# Patient Record
Sex: FEMALE | Race: WHITE | Employment: OTHER | URBAN - METROPOLITAN AREA
[De-identification: names, ages, dates, MRNs, and addresses within clinical notes are randomized per-mention and may not be internally consistent; named-entity substitution may affect disease eponyms.]

---

## 2017-08-30 ENCOUNTER — HOSPITAL ENCOUNTER (OUTPATIENT)
Dept: LAB | Age: 62
Discharge: HOME | End: 2017-08-30
Payer: SUBSIDIZED

## 2017-08-30 DIAGNOSIS — C50.911: ICD-10-CM

## 2017-08-30 LAB
ALBUMIN SERPL-MCNC: 3.9 G/DL (ref 3.2–4.6)
ALBUMIN/GLOB SERPL: 1.1 {RATIO} (ref 1.2–3.5)
ALP SERPL-CCNC: 92 U/L (ref 50–136)
ALT SERPL-CCNC: 34 U/L (ref 12–65)
ANION GAP SERPL CALC-SCNC: 6 MMOL/L (ref 7–16)
AST SERPL-CCNC: 27 U/L (ref 15–37)
BASOPHILS # BLD: 0 K/UL (ref 0–0.2)
BASOPHILS NFR BLD: 1 % (ref 0–2)
BILIRUB SERPL-MCNC: 0.3 MG/DL (ref 0.2–1.1)
BUN SERPL-MCNC: 13 MG/DL (ref 8–23)
CALCIUM SERPL-MCNC: 9 MG/DL (ref 8.3–10.4)
CHLORIDE SERPL-SCNC: 109 MMOL/L (ref 98–107)
CO2 SERPL-SCNC: 27 MMOL/L (ref 21–32)
CREAT SERPL-MCNC: 0.92 MG/DL (ref 0.6–1)
DIFFERENTIAL METHOD BLD: ABNORMAL
EOSINOPHIL # BLD: 0 K/UL (ref 0–0.8)
EOSINOPHIL NFR BLD: 0 % (ref 0.5–7.8)
ERYTHROCYTE [DISTWIDTH] IN BLOOD BY AUTOMATED COUNT: 12.8 % (ref 11.9–14.6)
GLOBULIN SER CALC-MCNC: 3.4 G/DL (ref 2.3–3.5)
GLUCOSE SERPL-MCNC: 67 MG/DL (ref 65–100)
HCT VFR BLD AUTO: 40.9 % (ref 35.8–46.3)
HGB BLD-MCNC: 13.5 G/DL (ref 11.7–15.4)
LYMPHOCYTES # BLD: 1.3 K/UL (ref 0.5–4.6)
LYMPHOCYTES NFR BLD: 35 % (ref 13–44)
MCH RBC QN AUTO: 29.9 PG (ref 26.1–32.9)
MCHC RBC AUTO-ENTMCNC: 33 G/DL (ref 31.4–35)
MCV RBC AUTO: 90.7 FL (ref 79.6–97.8)
MONOCYTES # BLD: 0.2 K/UL (ref 0.1–1.3)
MONOCYTES NFR BLD: 6 % (ref 4–12)
NEUTS SEG # BLD: 2.1 K/UL (ref 1.7–8.2)
NEUTS SEG NFR BLD: 58 % (ref 43–78)
NRBC # BLD: 0 K/UL (ref 0–0.2)
PLATELET # BLD AUTO: 196 K/UL (ref 150–450)
PMV BLD AUTO: 9.8 FL (ref 10.8–14.1)
POTASSIUM SERPL-SCNC: 3.9 MMOL/L (ref 3.5–5.1)
PROT SERPL-MCNC: 7.3 G/DL (ref 6.3–8.2)
RBC # BLD AUTO: 4.51 M/UL (ref 4.05–5.25)
SODIUM SERPL-SCNC: 142 MMOL/L (ref 136–145)
WBC # BLD AUTO: 3.6 K/UL (ref 4.3–11.1)

## 2017-08-30 PROCEDURE — 36415 COLL VENOUS BLD VENIPUNCTURE: CPT | Performed by: INTERNAL MEDICINE

## 2017-08-30 PROCEDURE — 80053 COMPREHEN METABOLIC PANEL: CPT | Performed by: INTERNAL MEDICINE

## 2017-08-30 PROCEDURE — 85025 COMPLETE CBC W/AUTO DIFF WBC: CPT | Performed by: INTERNAL MEDICINE

## 2017-11-10 ENCOUNTER — HOSPITAL ENCOUNTER (OUTPATIENT)
Dept: LAB | Age: 62
Discharge: HOME | End: 2017-11-10
Payer: SUBSIDIZED

## 2017-11-10 DIAGNOSIS — C50.919 INFILTRATING DUCT CARCINOMA (HCC): ICD-10-CM

## 2017-11-10 LAB
ALBUMIN SERPL-MCNC: 4 G/DL (ref 3.2–4.6)
ALBUMIN/GLOB SERPL: 1.1 {RATIO} (ref 1.2–3.5)
ALP SERPL-CCNC: 89 U/L (ref 50–136)
ALT SERPL-CCNC: 37 U/L (ref 12–65)
ANION GAP SERPL CALC-SCNC: 9 MMOL/L (ref 7–16)
AST SERPL-CCNC: 33 U/L (ref 15–37)
BASOPHILS # BLD: 0 K/UL (ref 0–0.2)
BASOPHILS NFR BLD: 1 % (ref 0–2)
BILIRUB SERPL-MCNC: 0.5 MG/DL (ref 0.2–1.1)
BUN SERPL-MCNC: 18 MG/DL (ref 8–23)
CALCIUM SERPL-MCNC: 9.2 MG/DL (ref 8.3–10.4)
CHLORIDE SERPL-SCNC: 104 MMOL/L (ref 98–107)
CO2 SERPL-SCNC: 28 MMOL/L (ref 21–32)
CREAT SERPL-MCNC: 0.89 MG/DL (ref 0.6–1)
DIFFERENTIAL METHOD BLD: ABNORMAL
EOSINOPHIL # BLD: 0 K/UL (ref 0–0.8)
EOSINOPHIL NFR BLD: 0 % (ref 0.5–7.8)
ERYTHROCYTE [DISTWIDTH] IN BLOOD BY AUTOMATED COUNT: 12.9 % (ref 11.9–14.6)
GLOBULIN SER CALC-MCNC: 3.5 G/DL (ref 2.3–3.5)
GLUCOSE SERPL-MCNC: 94 MG/DL (ref 65–100)
HCT VFR BLD AUTO: 40.7 % (ref 35.8–46.3)
HGB BLD-MCNC: 13.8 G/DL (ref 11.7–15.4)
LYMPHOCYTES # BLD: 1.3 K/UL (ref 0.5–4.6)
LYMPHOCYTES NFR BLD: 32 % (ref 13–44)
MCH RBC QN AUTO: 30 PG (ref 26.1–32.9)
MCHC RBC AUTO-ENTMCNC: 33.9 G/DL (ref 31.4–35)
MCV RBC AUTO: 88.5 FL (ref 79.6–97.8)
MONOCYTES # BLD: 0.2 K/UL (ref 0.1–1.3)
MONOCYTES NFR BLD: 6 % (ref 4–12)
NEUTS SEG # BLD: 2.5 K/UL (ref 1.7–8.2)
NEUTS SEG NFR BLD: 61 % (ref 43–78)
NRBC # BLD: 0 K/UL (ref 0–0.2)
PLATELET # BLD AUTO: 171 K/UL (ref 150–450)
PMV BLD AUTO: 9.3 FL (ref 10.8–14.1)
POTASSIUM SERPL-SCNC: 3.8 MMOL/L (ref 3.5–5.1)
PROT SERPL-MCNC: 7.5 G/DL (ref 6.3–8.2)
RBC # BLD AUTO: 4.6 M/UL (ref 4.05–5.25)
SODIUM SERPL-SCNC: 141 MMOL/L (ref 136–145)
WBC # BLD AUTO: 4.1 K/UL (ref 4.3–11.1)

## 2017-11-10 PROCEDURE — 85025 COMPLETE CBC W/AUTO DIFF WBC: CPT | Performed by: NURSE PRACTITIONER

## 2017-11-10 PROCEDURE — 80053 COMPREHEN METABOLIC PANEL: CPT | Performed by: NURSE PRACTITIONER

## 2017-11-10 PROCEDURE — 36415 COLL VENOUS BLD VENIPUNCTURE: CPT | Performed by: NURSE PRACTITIONER

## 2017-11-15 ENCOUNTER — HOSPITAL ENCOUNTER (OUTPATIENT)
Dept: NUCLEAR MEDICINE | Age: 62
Discharge: HOME | End: 2017-11-15
Attending: NURSE PRACTITIONER
Payer: SUBSIDIZED

## 2017-11-15 DIAGNOSIS — C50.919 INFILTRATING DUCT CARCINOMA (HCC): ICD-10-CM

## 2017-11-15 DIAGNOSIS — M89.8X2 PAIN OF LEFT HUMERUS: ICD-10-CM

## 2017-11-15 PROCEDURE — 78306 BONE IMAGING WHOLE BODY: CPT

## 2018-02-01 PROBLEM — Z91.040 LATEX ALLERGY: Status: ACTIVE | Noted: 2018-02-01

## 2018-02-01 PROBLEM — Z85.3 HISTORY OF BREAST CANCER: Status: ACTIVE | Noted: 2018-02-01

## 2018-02-01 PROBLEM — K21.9 GASTROESOPHAGEAL REFLUX DISEASE: Status: ACTIVE | Noted: 2018-02-01

## 2018-02-01 PROBLEM — I10 ESSENTIAL HYPERTENSION: Status: ACTIVE | Noted: 2018-02-01

## 2018-02-07 ENCOUNTER — HOSPITAL ENCOUNTER (OUTPATIENT)
Dept: MAMMOGRAPHY | Age: 63
Discharge: HOME | End: 2018-02-07
Attending: INTERNAL MEDICINE
Payer: SUBSIDIZED

## 2018-02-07 DIAGNOSIS — M85.80 OSTEOPENIA, UNSPECIFIED LOCATION: ICD-10-CM

## 2018-02-07 PROCEDURE — 77080 DXA BONE DENSITY AXIAL: CPT

## 2018-02-08 NOTE — PROGRESS NOTES
Your bone density again indicates osteopenia and is slightly lower but similar to 2013 levels. I recommend you continue daily exercise, calcium/vitamin D supplements to protect your bones.

## 2018-02-19 ENCOUNTER — ANESTHESIA EVENT (OUTPATIENT)
Dept: SURGERY | Age: 63
End: 2018-02-19
Payer: SUBSIDIZED

## 2018-02-20 ENCOUNTER — ANESTHESIA (OUTPATIENT)
Dept: SURGERY | Age: 63
End: 2018-02-20
Payer: SUBSIDIZED

## 2018-02-20 ENCOUNTER — HOSPITAL ENCOUNTER (OUTPATIENT)
Age: 63
Setting detail: OUTPATIENT SURGERY
Discharge: HOME | End: 2018-02-20
Attending: ORTHOPAEDIC SURGERY | Admitting: ORTHOPAEDIC SURGERY
Payer: SUBSIDIZED

## 2018-02-20 VITALS
RESPIRATION RATE: 15 BRPM | DIASTOLIC BLOOD PRESSURE: 91 MMHG | OXYGEN SATURATION: 97 % | SYSTOLIC BLOOD PRESSURE: 155 MMHG | HEART RATE: 71 BPM | TEMPERATURE: 97.7 F | HEIGHT: 65 IN | BODY MASS INDEX: 26.49 KG/M2 | WEIGHT: 159 LBS

## 2018-02-20 DIAGNOSIS — L76.82 PAIN AT SURGICAL INCISION: Primary | ICD-10-CM

## 2018-02-20 PROCEDURE — 76210000021 HC REC RM PH II 0.5 TO 1 HR: Performed by: ORTHOPAEDIC SURGERY

## 2018-02-20 PROCEDURE — 88305 TISSUE EXAM BY PATHOLOGIST: CPT | Performed by: ORTHOPAEDIC SURGERY

## 2018-02-20 PROCEDURE — 74011250636 HC RX REV CODE- 250/636: Performed by: ORTHOPAEDIC SURGERY

## 2018-02-20 PROCEDURE — 74011000250 HC RX REV CODE- 250

## 2018-02-20 PROCEDURE — 77030000032 HC CUF TRNQT ZIMM -B: Performed by: ORTHOPAEDIC SURGERY

## 2018-02-20 PROCEDURE — 77030018836 HC SOL IRR NACL ICUM -A: Performed by: ORTHOPAEDIC SURGERY

## 2018-02-20 PROCEDURE — 74011000250 HC RX REV CODE- 250: Performed by: ORTHOPAEDIC SURGERY

## 2018-02-20 PROCEDURE — 76060000032 HC ANESTHESIA 0.5 TO 1 HR: Performed by: ORTHOPAEDIC SURGERY

## 2018-02-20 PROCEDURE — 76010000138 HC OR TIME 0.5 TO 1 HR: Performed by: ORTHOPAEDIC SURGERY

## 2018-02-20 PROCEDURE — 76210000063 HC OR PH I REC FIRST 0.5 HR: Performed by: ORTHOPAEDIC SURGERY

## 2018-02-20 PROCEDURE — 74011250636 HC RX REV CODE- 250/636: Performed by: ANESTHESIOLOGY

## 2018-02-20 PROCEDURE — 74011250637 HC RX REV CODE- 250/637: Performed by: ANESTHESIOLOGY

## 2018-02-20 PROCEDURE — 74011250636 HC RX REV CODE- 250/636

## 2018-02-20 RX ORDER — OXYCODONE HYDROCHLORIDE 5 MG/1
5 TABLET ORAL
Qty: 40 TAB | Refills: 0 | Status: SHIPPED | OUTPATIENT
Start: 2018-02-20 | End: 2018-04-19

## 2018-02-20 RX ORDER — FAMOTIDINE 20 MG/1
20 TABLET, FILM COATED ORAL ONCE
Status: COMPLETED | OUTPATIENT
Start: 2018-02-20 | End: 2018-02-20

## 2018-02-20 RX ORDER — LIDOCAINE HYDROCHLORIDE 5 MG/ML
INJECTION, SOLUTION INFILTRATION; INTRAVENOUS AS NEEDED
Status: DISCONTINUED | OUTPATIENT
Start: 2018-02-20 | End: 2018-02-20 | Stop reason: HOSPADM

## 2018-02-20 RX ORDER — CEFAZOLIN SODIUM/WATER 2 G/20 ML
2 SYRINGE (ML) INTRAVENOUS ONCE
Status: COMPLETED | OUTPATIENT
Start: 2018-02-20 | End: 2018-02-20

## 2018-02-20 RX ORDER — SODIUM CHLORIDE, SODIUM LACTATE, POTASSIUM CHLORIDE, CALCIUM CHLORIDE 600; 310; 30; 20 MG/100ML; MG/100ML; MG/100ML; MG/100ML
75 INJECTION, SOLUTION INTRAVENOUS CONTINUOUS
Status: DISCONTINUED | OUTPATIENT
Start: 2018-02-20 | End: 2018-02-20 | Stop reason: HOSPADM

## 2018-02-20 RX ORDER — OXYCODONE HYDROCHLORIDE 5 MG/1
5 TABLET ORAL
Status: DISCONTINUED | OUTPATIENT
Start: 2018-02-20 | End: 2018-02-20 | Stop reason: HOSPADM

## 2018-02-20 RX ORDER — HYDROMORPHONE HYDROCHLORIDE 2 MG/ML
0.5 INJECTION, SOLUTION INTRAMUSCULAR; INTRAVENOUS; SUBCUTANEOUS
Status: DISCONTINUED | OUTPATIENT
Start: 2018-02-20 | End: 2018-02-20 | Stop reason: HOSPADM

## 2018-02-20 RX ORDER — FENTANYL CITRATE 50 UG/ML
100 INJECTION, SOLUTION INTRAMUSCULAR; INTRAVENOUS ONCE
Status: DISCONTINUED | OUTPATIENT
Start: 2018-02-20 | End: 2018-02-20 | Stop reason: HOSPADM

## 2018-02-20 RX ORDER — PROPOFOL 10 MG/ML
INJECTION, EMULSION INTRAVENOUS
Status: DISCONTINUED | OUTPATIENT
Start: 2018-02-20 | End: 2018-02-20 | Stop reason: HOSPADM

## 2018-02-20 RX ORDER — BUPIVACAINE HYDROCHLORIDE 2.5 MG/ML
INJECTION, SOLUTION EPIDURAL; INFILTRATION; INTRACAUDAL AS NEEDED
Status: DISCONTINUED | OUTPATIENT
Start: 2018-02-20 | End: 2018-02-20 | Stop reason: HOSPADM

## 2018-02-20 RX ORDER — MIDAZOLAM HYDROCHLORIDE 1 MG/ML
2 INJECTION, SOLUTION INTRAMUSCULAR; INTRAVENOUS ONCE
Status: DISCONTINUED | OUTPATIENT
Start: 2018-02-20 | End: 2018-02-20 | Stop reason: HOSPADM

## 2018-02-20 RX ORDER — LIDOCAINE HYDROCHLORIDE 10 MG/ML
0.1 INJECTION INFILTRATION; PERINEURAL AS NEEDED
Status: DISCONTINUED | OUTPATIENT
Start: 2018-02-20 | End: 2018-02-20 | Stop reason: HOSPADM

## 2018-02-20 RX ORDER — PROPOFOL 10 MG/ML
INJECTION, EMULSION INTRAVENOUS AS NEEDED
Status: DISCONTINUED | OUTPATIENT
Start: 2018-02-20 | End: 2018-02-20 | Stop reason: HOSPADM

## 2018-02-20 RX ORDER — OXYCODONE HYDROCHLORIDE 5 MG/1
10 TABLET ORAL
Status: DISCONTINUED | OUTPATIENT
Start: 2018-02-20 | End: 2018-02-20 | Stop reason: HOSPADM

## 2018-02-20 RX ADMIN — PROPOFOL 30 MG: 10 INJECTION, EMULSION INTRAVENOUS at 14:11

## 2018-02-20 RX ADMIN — Medication 2 G: at 14:07

## 2018-02-20 RX ADMIN — PROPOFOL 100 MCG/KG/MIN: 10 INJECTION, EMULSION INTRAVENOUS at 14:10

## 2018-02-20 RX ADMIN — FAMOTIDINE 20 MG: 20 TABLET, FILM COATED ORAL at 14:00

## 2018-02-20 RX ADMIN — LIDOCAINE HYDROCHLORIDE 25 ML: 5 INJECTION, SOLUTION INFILTRATION; INTRAVENOUS at 14:13

## 2018-02-20 RX ADMIN — SODIUM CHLORIDE, SODIUM LACTATE, POTASSIUM CHLORIDE, AND CALCIUM CHLORIDE 75 ML/HR: 600; 310; 30; 20 INJECTION, SOLUTION INTRAVENOUS at 13:40

## 2018-02-20 NOTE — DISCHARGE INSTRUCTIONS
Elevate operative hand  Move fingers often  Expect finger swelling  Call office for questions or problems  Wound care as discussed. Remove dressing in 2-3 days and cover as directed. TYPICAL SIDE EFFECTS OF PAIN MEDICATION:  *    Constipation: Drink lots of fluids, try prune juice. OTC stool softener if needed. *    Nausea: Take pain medication with food. ACTIVITY  · As tolerated and as directed by your doctor. · Bathe or shower as directed by your doctor. DIET  · Day of surgery: Clear liquids until no nausea or vomiting; small portion, light diet [place] foods (ex: baked chicken, plain rice, grits, scrambled eggs, toast). Nothing greasy, fried or spicy today. · Advance to regular diet on second day, unless your doctor orders otherwise. · If nausea and vomiting continues, call your doctor. PAIN  · Take pain medication as directed by your doctor. · DO NOT take aspirin or blood thinners unless directed by your doctor. CALL YOUR DOCTOR IF    s Call your doctor if pain is NOT relieved by medication.   s Excessive bleeding that does not stop after holding pressure over the area  · Temperature of 101 degrees F or above  · Excessive redness, swelling or bruising, and/ or green or yellow, smelly discharge from incision    AFTER ANESTHESIA   · For the first 24 hours: DO NOT Drive, Drink alcoholic beverages, or Make important decisions. · Be aware of dizziness following anesthesia and while taking pain medication. DISCHARGE SUMMARY from Nurse    PATIENT INSTRUCTIONS:    After general anesthesia or intravenous sedation, for 24 hours or while taking prescription Narcotics:  · Limit your activities  · Do not drive and operate hazardous machinery  · Do not make important personal or business decisions  · Do  not drink alcoholic beverages  · If you have not urinated within 8 hours after discharge, please contact your surgeon on call.     *  Please give a list of your current medications to your Primary Care Provider. *  Please update this list whenever your medications are discontinued, doses are      changed, or new medications (including over-the-counter products) are added. *  Please carry medication information at all times in case of emergency situations. Preventing Infection at Home  We care about preventing infection and avoiding the spread of germs - not only when you are in the hospital but also when you return home. When you return home from the hospital, its important to take the following steps to help prevent infection and avoid spreading germs that could infect you and others. Ask everyone in your home to follow these guidelines, too. Clean Your Hands  · Clean your hands whenever your hands are visibly dirty, before you eat, before or after touching your mouth, nose or eyes, and before preparing food. Clean them after contact with body fluids, using the restroom, touching animals or changing diapers. · When washing hands, wet them with warm water and work up a lather. Rub hands for at least 15 seconds, then rinse them and pat them dry with a clean towel or paper towel. · When using hand sanitizers, it should take about 15 seconds to rub your hands dry. If not, you probably didnt apply enough . Cover Your Sneeze or Cough  Germs are released into the air whenever you sneeze or cough. To prevent the spread of infection:  · Turn away from other people before coughing or sneezing. · Cover your mouth or nose with a tissue when you cough or sneeze. Put the tissue in the trash. · If you dont have a tissue, cough or sneeze into your upper sleeve, not your hands. · Always clean your hands after coughing or sneezing. Care for Wounds  Your skin is your bodys first line of defense against germs, but an open wound leaves an easy way for germs to enter your body.  To prevent infection:  · Clean your hands before and after changing wound dressings, and wear gloves to change dressings if recommended by your doctor. · Take special care with IV lines or other devices inserted into the body. If you must touch them, clean your hands first.  · Follow any specific instructions from your doctor to care for your wounds. Contact your doctor if you experience any signs of infection, such as fever or increased redness at the surgical or wound site. Keep a Clean Home  · Clean or wipe commonly touched hard surfaces like door handles, sinks, tabletops, phones and TV remotes. · Use products labeled disinfectant to kill harmful bacteria and viruses. · Use a clean cloth or paper towel to clean and dry surfaces. Wiping surfaces with a dirty dishcloth, sponge or towel will only spread germs. · Never share toothbrushes, farris, drinking glasses, utensils, razor blades, face cloths or bath towels to avoid spreading germs. · Be sure that the linens that you sleep on are clean. · Keep pets away from wounds and wash your hands after touching pets, their toys or bedding. We care about you and your health. Remember, preventing infections is a team effort between you, your family, friends and health care providers. These are general instructions for a healthy lifestyle:    No smoking/ No tobacco products/ Avoid exposure to second hand smoke    Surgeon General's Warning:  Quitting smoking now greatly reduces serious risk to your health. Obesity, smoking, and sedentary lifestyle greatly increases your risk for illness    A healthy diet, regular physical exercise & weight monitoring are important for maintaining a healthy lifestyle    You may be retaining fluid if you have a history of heart failure or if you experience any of the following symptoms:  Weight gain of 3 pounds or more overnight or 5 pounds in a week, increased swelling in our hands or feet or shortness of breath while lying flat in bed.   Please call your doctor as soon as you notice any of these symptoms; do not wait until your next office visit. Recognize signs and symptoms of STROKE:    F-face looks uneven    A-arms unable to move or move unevenly    S-speech slurred or non-existent    T-time-call 911 as soon as signs and symptoms begin-DO NOT go       Back to bed or wait to see if you get better-TIME IS BRAIN.

## 2018-02-20 NOTE — IP AVS SNAPSHOT
[address] [facility] 
[phone] Patient: Sandra Aguirre MRN: [id number]:[id number] About your hospitalization You were admitted on:  February 20, 2018 You last received care in the:  [facility] PACU You were discharged on:  February 20, 2018 Why you were hospitalized Your primary diagnosis was:  Not on File Follow-up Information Follow up With Details Comments Contact Info Roderick Mei MD   [address] [facility] 
[address] [place] [address] Raudel Bourgeois MD   [address] [facility] [address],[address] 
[phone] Discharge Orders None A check levi indicates which time of day the medication should be taken. My Medications START taking these medications Instructions Each Dose to Equal  
 Morning Noon Evening Bedtime  
 oxyCODONE IR 5 mg immediate release tablet Commonly known as:  Minerva Furdaniel Your last dose was: Your next dose is: Take 1 Tab by mouth every four (4) hours as needed. Max Daily Amount: 30 mg.  
 5 mg CONTINUE taking these medications Instructions Each Dose to Equal  
 Morning Noon Evening Bedtime CALCIUM CITRATE + D PO Your last dose was: Your next dose is: Take 2 capsules by mouth two (2) times a day. 630 mg and 500 iu 2 Cap CEROVITE ADVANCED FORMULA PO Your last dose was: Your next dose is: Take 1 Tab by mouth daily. 1 Tab  
    
   
   
   
  
 cloNIDine HCl 0.1 mg tablet Commonly known as:  CATAPRES Your last dose was: Your next dose is: Take 0.1 mg by mouth every evening. 0.1 mg  
    
   
   
   
  
 CRANBERRY CONCENTRATE Cap Generic drug:  vitamin c-vitamin e Your last dose was: Your next dose is: Take 168 mg by mouth nightly. 168 mg  
    
   
   
   
  
 ECHINACEA PO Your last dose was: Your next dose is: Take 750 mg by mouth nightly. 750 mg  
    
   
   
   
  
 EPIPEN 0.3 mg/0.3 mL injection Generic drug:  EPINEPHrine Your last dose was: Your next dose is: 0.3 mg by IntraMUSCular route once as needed. 0.3 mg  
    
   
   
   
  
 garlic 1 mg Cap Your last dose was: Your next dose is: Take  by mouth nightly. GINKOBA PO Your last dose was: Your next dose is: Take 120 mg by mouth two (2) times a day. 120 mg  
    
   
   
   
  
 magnesium 250 mg Tab Your last dose was: Your next dose is: Take  by mouth nightly. metoprolol tartrate 50 mg tablet Commonly known as:  LOPRESSOR Your last dose was: Your next dose is: Take 50 mg by mouth daily. 50 mg  
    
   
   
   
  
 OLIVE LEAF EXTRACT PO Your last dose was: Your next dose is: Take 150 mg by mouth daily. 150 mg  
    
   
   
   
  
 omeprazole 40 mg capsule Commonly known as:  PRILOSEC Your last dose was: Your next dose is: Take 40 mg by mouth daily. 40 mg  
    
   
   
   
  
 OTHER(NON-FORMULARY) Your last dose was: Your next dose is: Take  by mouth nightly. Alpha lipoic acid 200mg daily VITAMIN B-12 PO Your last dose was: Your next dose is: Take 2,500 mcg by mouth daily. 2500 mcg VITAMIN C 500 mg tablet Generic drug:  ascorbic acid (vitamin C) Your last dose was: Your next dose is: Take  by mouth.  
     
   
   
   
  
 vitamin E 400 unit capsule Commonly known as:  [address] Your last dose was: Your next dose is: Take  by mouth daily. Where to Get Your Medications Information on where to get these meds will be given to you by the nurse or doctor. ! Ask your nurse or doctor about these medications  
  oxyCODONE IR 5 mg immediate release tablet Discharge Instructions Elevate operative hand Move fingers often Expect finger swelling Call office for questions or problems Wound care as discussed. Remove dressing in 2-3 days and cover as directed. TYPICAL SIDE EFFECTS OF PAIN MEDICATION: 
*    Constipation: Drink lots of fluids, try prune juice. OTC stool softener if needed. *    Nausea: Take pain medication with food. ACTIVITY · As tolerated and as directed by your doctor. · Bathe or shower as directed by your doctor. DIET 
· Day of surgery: Clear liquids until no nausea or vomiting; small portion, light diet [place] foods (ex: baked chicken, plain rice, grits, scrambled eggs, toast). Nothing greasy, fried or spicy today. · Advance to regular diet on second day, unless your doctor orders otherwise. · If nausea and vomiting continues, call your doctor. PAIN 
· Take pain medication as directed by your doctor. · DO NOT take aspirin or blood thinners unless directed by your doctor. CALL YOUR DOCTOR IF   
s Call your doctor if pain is NOT relieved by medication.  
s Excessive bleeding that does not stop after holding pressure over the area · Temperature of 101 degrees F or above · Excessive redness, swelling or bruising, and/ or green or yellow, smelly discharge from incision AFTER ANESTHESIA · For the first 24 hours: DO NOT Drive, Drink alcoholic beverages, or Make important decisions. · Be aware of dizziness following anesthesia and while taking pain medication. DISCHARGE SUMMARY from Nurse PATIENT INSTRUCTIONS: 
 
After general anesthesia or intravenous sedation, for 24 hours or while taking prescription Narcotics: · Limit your activities · Do not drive and operate hazardous machinery · Do not make important personal or business decisions · Do  not drink alcoholic beverages · If you have not urinated within 8 hours after discharge, please contact your surgeon on call. *  Please give a list of your current medications to your Primary Care Provider. *  Please update this list whenever your medications are discontinued, doses are 
    changed, or new medications (including over-the-counter products) are added. *  Please carry medication information at all times in case of emergency situations. Preventing Infection at Home We care about preventing infection and avoiding the spread of germs  not only when you are in the hospital but also when you return home. When you return home from the hospital, its important to take the following steps to help prevent infection and avoid spreading germs that could infect you and others. Ask everyone in your home to follow these guidelines, too. Clean Your Hands · Clean your hands whenever your hands are visibly dirty, before you eat, before or after touching your mouth, nose or eyes, and before preparing food. Clean them after contact with body fluids, using the restroom, touching animals or changing diapers. · When washing hands, wet them with warm water and work up a lather. Rub hands for at least 15 seconds, then rinse them and pat them dry with a clean towel or paper towel. · When using hand sanitizers, it should take about 15 seconds to rub your hands dry. If not, you probably didnt apply enough . Cover Your Sneeze or Cough Germs are released into the air whenever you sneeze or cough. To prevent the spread of infection: · Turn away from other people before coughing or sneezing. · Cover your mouth or nose with a tissue when you cough or sneeze. Put the tissue in the trash.  
· If you dont have a tissue, cough or sneeze into your upper sleeve, not your hands. · Always clean your hands after coughing or sneezing. Care for Wounds Your skin is your bodys first line of defense against germs, but an open wound leaves an easy way for germs to enter your body. To prevent infection: · Clean your hands before and after changing wound dressings, and wear gloves to change dressings if recommended by your doctor. · Take special care with IV lines or other devices inserted into the body. If you must touch them, clean your hands first. 
· Follow any specific instructions from your doctor to care for your wounds. Contact your doctor if you experience any signs of infection, such as fever or increased redness at the surgical or wound site. Keep a [place] [address] · Clean or wipe commonly touched hard surfaces like door handles, sinks, tabletops, phones and TV remotes. · Use products labeled disinfectant to kill harmful bacteria and viruses. · Use a clean cloth or paper towel to clean and dry surfaces. Wiping surfaces with a dirty dishcloth, sponge or towel will only spread germs. · Never share toothbrushes, farris, drinking glasses, utensils, razor blades, face cloths or bath towels to avoid spreading germs. · Be sure that the linens that you sleep on are clean. · Keep pets away from wounds and wash your hands after touching pets, their toys or bedding. We care about you and your health. Remember, preventing infections is a team effort between you, your family, friends and health care providers. These are general instructions for a healthy lifestyle: No smoking/ No tobacco products/ Avoid exposure to second hand smoke Surgeon General's Warning:  Quitting smoking now greatly reduces serious risk to your health. Obesity, smoking, and sedentary lifestyle greatly increases your risk for illness A healthy diet, regular physical exercise & weight monitoring are important for maintaining a healthy lifestyle You may be retaining fluid if you have a history of heart failure or if you experience any of the following symptoms:  Weight gain of 3 pounds or more overnight or 5 pounds in a week, increased swelling in our hands or feet or shortness of breath while lying flat in bed. Please call your doctor as soon as you notice any of these symptoms; do not wait until your next office visit. Recognize signs and symptoms of STROKE: 
 
F-face looks uneven A-arms unable to move or move unevenly S-speech slurred or non-existent T-time-call 911 as soon as signs and symptoms begin-DO NOT go Back to bed or wait to see if you get better-TIME IS BRAIN. Introducing [facility]! Dear Cristopher Scott: Thank you for requesting a mSpot account. Our records indicate that you already have an active mSpot account. You can access your account anytime at [email or web address] Did you know that you can access your hospital and ER discharge instructions at any time in mSpot? You can also review all of your test results from your hospital stay or ER visit. Additional Information If you have questions, please visit the Frequently Asked Questions section of the mSpot website at [email or web address] Remember, mSpot is NOT to be used for urgent needs. For medical emergencies, dial 911. Now available from your iPhone and Android! Providers Seen During Your Hospitalization Provider Specialty Primary office phone Cam Michael MD Orthopedic Surgery [phone] Your Primary Care Physician (PCP) Primary Care Physician Office Phone Office Fax Pilar Costello [phone] You are allergic to the following Allergen Reactions Latex Anaphylaxis Rash The anaphylactic reaction was with latex paint Aleve (Naproxen Sodium) Swelling Facial/edema swelling Zestril (Lisinopril) Anaphylaxis Zofran (Ondansetron Hcl) Rash Intense vasodilation with redness, hypotension, responded to epinephrine Amlodipine Swelling  
 ankles swell Recent Documentation Height Weight BMI OB Status Smoking Status 1.638 m 72.1 kg 26.87 kg/m2 Hysterectomy Never Smoker Emergency Contacts Name Discharge Info Relation Home Work Mobile [address] CAREGIVER [3] Spouse [3] [phone] Patient Belongings The following personal items are in your possession at time of discharge: 
  Dental Appliances: None  Visual Aid: Glasses      Home Medications: None   Jewelry: None  Clothing: Socks, Shirt, Pants, Jacket/Coat, Footwear, Undergarments    Other Valuables: None Please provide this summary of care documentation to your next provider. Signatures-by signing, you are acknowledging that this After Visit Summary has been reviewed with you and you have received a copy. Patient Signature:  ____________________________________________________________ Date:  ____________________________________________________________  
  
Sherie Llanes Provider Signature:  ____________________________________________________________ Date:  ____________________________________________________________

## 2018-02-20 NOTE — H&P
Pre Operative History and Physical Exam    Patient ID:  Latonya Hamilton  [id number]  43 y.o.  1955    Today: February 20, 2018       Assessment:   1. Right hand mass        Plan:    1. Proceed with scheduled Procedure(s) (LRB):  SOFT TISSUE MASS EXCISION RIGHT HAND (Right)            CC: Right hand mass     HPI:   The patient has a right hand painful/ tender mass. The patient was evaluated and examined during a consultation prior to this office visit. There have been no changes to the patient's orthopedic condition since the initial consultation. The patient has failed previous conservative treatment for this condition. The patient will present the day of surgery for Procedure(s) (LRB):  SOFT TISSUE MASS EXCISION RIGHT HAND (Right)      Past Medical/Surgical History:  Past Medical History:   Diagnosis Date    Adverse effect of anesthesia     BP plummets intraop usually    Arthritis     DDD    Cancer ([address]     breast, left    Chronic pain     pt denies presently- lumbar sometimes    GERD (gastroesophageal reflux disease)     well controlled    HTN (hypertension)     120s/80s- controlled with med    Thromboembolus ([address]     jugular vein thrombus (port)     Past Surgical History:   Procedure Laterality Date    ABDOMEN SURGERY PROC UNLISTED      breast breast reconstruction with abdominal liposuction    HX BLADDER SUSPENSION      bladder tack    HX BREAST BIOPSY      x5    HX BREAST RECONSTRUCTION      x2    HX HEENT  02/2011    thyroglossal cyst removed    HX HYSTERECTOMY      HX MASTECTOMY Bilateral     x2    HX TONSILLECTOMY          Allergies:    Allergies   Allergen Reactions    Latex Anaphylaxis and Rash     The anaphylactic reaction was with latex paint    Aleve [Naproxen Sodium] Swelling     Facial/edema swelling    Zestril [Lisinopril] Anaphylaxis    Zofran [Ondansetron Hcl] Rash     Intense vasodilation with redness, hypotension, responded to epinephrine    Amlodipine Swelling     ankles baron        Physical Exam:   General: NAD, Alert, Oriented, Appears their stated age     [de-identified]: NC/AT, PERRL    Skin: No rashes, lesions or wounds seen      Psych: normal affect      Heart: Regular Rate, Rhythm     Lungs: unlabored respirations, normal breath sounds     Abdomen: Soft and non-distended     Ortho:  Soft tissue mass over radial side of right index finger    Neuro: no focal defects, sensation is equal bilaterally     Lymph: no lymphadenopathy     Meds:   Current Facility-Administered Medications   Medication Dose Route Frequency    ceFAZolin (ANCEF) 2 g/20 mL in sterile water IV syringe  2 g IntraVENous ONCE    lidocaine (XYLOCAINE) 10 mg/mL (1 %) injection 0.1 mL  0.1 mL SubCUTAneous PRN    lactated Ringers infusion  75 mL/hr IntraVENous CONTINUOUS    famotidine (PEPCID) tablet 20 mg  20 mg Oral ONCE    fentaNYL citrate (PF) injection 100 mcg  100 mcg IntraVENous ONCE    midazolam (VERSED) injection 2 mg  2 mg IntraVENous ONCE    midazolam (VERSED) injection 2 mg  2 mg IntraVENous ONCE         Labs:  Office Visit on 02/01/2018   Component Date Value Ref Range Status    Glucose 02/01/2018 80  65 - 99 mg/dL Final    BUN 02/01/2018 16  8 - 27 mg/dL Final    Creatinine 02/01/2018 0.92  0.57 - 1.00 mg/dL Final    GFR est non-AA 02/01/2018 66  >59 mL/min/1.73 Final    GFR est AA 02/01/2018 76  >59 mL/min/1.73 Final    BUN/Creatinine ratio 02/01/2018 17  12 - 28 Final    Sodium 02/01/2018 141  134 - 144 mmol/L Final    Potassium 02/01/2018 3.9  3.5 - 5.2 mmol/L Final    Chloride 02/01/2018 100  96 - 106 mmol/L Final    CO2 02/01/2018 26  18 - 29 mmol/L Final    Calcium 02/01/2018 9.4  8.7 - 10.3 mg/dL Final    WBC 02/01/2018 4.0  4.0 - 11.0 K/uL Final    RBC 02/01/2018 4.72  3.80 - 5.40 M/uL Final    HGB 02/01/2018 13.8  12.0 - 16.0 g/dL Final    HCT 02/01/2018 42.3  35.0 - 48.0 % Final    MCV 02/01/2018 89.5  80.0 - 100.0 fL Final    MCH 02/01/2018 29.3  27.0 - 34.0 pg Final    MCHC 02/01/2018 32.7  31.0 - 36.0 g/dL Final    PLATELET 89/45/6349 119  140 - 440 K/uL Final    ABS. LYMPHOCYTES 02/01/2018 1.2* 2.0 - 7.8 K/uL Final    LYMPHOCYTES 02/01/2018 30.8  20.5 - 51.1 % Final    ABS. GRANULOCYTES 02/01/2018 2.5  2.0 - 7.8 K/uL Final    GRANULOCYTES 02/01/2018 63.3  37.0 - 92.0 % Final    ABS. MONOCYTES 02/01/2018 0.20  0.10 - 1.08 K/ul Final    MONOCYTES 02/01/2018 5.9  3.0 - 10.0 % Final    RDW 02/01/2018 13.3  % Final    MEAN PLATELET VOLUME 25/37/3709 7.5  fL Final    Uric acid 02/01/2018 4.4  2.5 - 7.1 mg/dL Final               Therapeutic target for gout patients: <6.0    Color 02/01/2018 Yellow  Straw-Yellow Final    Appearance 02/01/2018 Clear  Clear Final    Glucose 02/01/2018 Negative  Negative mg/dL Final    Bilirubin 02/01/2018 Negative  Negative mg/dL Final    Ketone 02/01/2018 Negative  Negative mg/dL Final    Specific gravity 02/01/2018 1.015  1.000 - 1.030 Final    Blood 02/01/2018 Negative  Negative URBANO/UL Final    pH (UA) 02/01/2018 7.0  5.0 - 8.0 Final    Protein 02/01/2018 Negative  Negative mg/dL Final    Urobilinogen 02/01/2018 0.2 E.U./dL  0.0 - 1.0 E.U./dL Final    Nitrites 02/01/2018 Negative  Negative Final    Leukocyte Esterase 02/01/2018 Negative  Negative Brendon/uL Final    TSH 02/01/2018 2.580  0.450 - 4.500 uIU/mL Final    Protein, total 02/01/2018 6.8  6.0 - 8.5 g/dL Final    Albumin 02/01/2018 4.5  3.6 - 4.8 g/dL Final    Bilirubin, total 02/01/2018 0.4  0.0 - 1.2 mg/dL Final    Bilirubin, direct 02/01/2018 0.11  0.00 - 0.40 mg/dL Final    Alk.  phosphatase 02/01/2018 80  39 - 117 IU/L Final    AST (SGOT) 02/01/2018 23  0 - 40 IU/L Final    ALT (SGPT) 02/01/2018 19  0 - 32 IU/L Final    Cholesterol, total 02/01/2018 235* 100 - 199 mg/dL Final    Triglyceride 02/01/2018 157* 0 - 149 mg/dL Final    HDL Cholesterol 02/01/2018 62  >39 mg/dL Final    VLDL, calculated 02/01/2018 31  5 - 40 mg/dL Final    LDL, calculated 02/01/2018 142* 0 - 99 mg/dL Final    VITAMIN D, 25-HYDROXY 02/01/2018 46.7  30.0 - 100.0 ng/mL Final    Comment: Vitamin D deficiency has been defined by the [address] practice guideline as a  level of serum 25-OH vitamin D less than 20 ng/mL (1,2). The Endocrine Society went on to further define vitamin D  insufficiency as a level between 21 and 29 ng/mL (2). 1. IOM ([employer]). 2010. Dietary reference     intakes for calcium and D. [facility]: The     [place]. 2. Angel MF, Cris BOND, Nicci AUSTIN, et al.     Evaluation, treatment, and prevention of vitamin D     deficiency: an Endocrine Society clinical practice     guideline. JCEM. 2011 Jul; 96(7):1911-30.       Hemoglobin A1c 02/01/2018 5.2  4.8 - 5.6 % Final    Comment:          Pre-diabetes: 5.7 - 6.4           Diabetes: >6.4           Glycemic control for adults with diabetes: <7.0                   Patient Active Problem List   Diagnosis Code    Osteopenia M85.80    Infiltrating duct carcinoma ([facility] C50.919    Thyroglossal cyst Q89.2    Recurrent infections B99.9    Gastroesophageal reflux disease K21.9    Essential hypertension I10    Latex allergy Z91.040    History of breast cancer Z85.3         Signed By: Humberto Martinez MD  February 20, 2018

## 2018-02-20 NOTE — ANESTHESIA PREPROCEDURE EVALUATION
Anesthetic History               Review of Systems / Medical History  Patient summary reviewed and pertinent labs reviewed    Pulmonary                   Neuro/Psych              Cardiovascular    Hypertension: well controlled                   GI/Hepatic/Renal     GERD: well controlled           Endo/Other        Arthritis and cancer     Other Findings   Comments: Left breast cancer 12 years ago           Physical Exam    Airway  Mallampati: I  TM Distance: > 6 cm  Neck ROM: normal range of motion   Mouth opening: Normal     Cardiovascular  Regular rate and rhythm,  S1 and S2 normal,  no murmur, click, rub, or gallop  Rhythm: regular  Rate: normal         Dental  No notable dental hx       Pulmonary  Breath sounds clear to auscultation               Abdominal         Other Findings            Anesthetic Plan    ASA: 2  Anesthesia type: regional - [place] block            Anesthetic plan and risks discussed with: Patient

## 2018-02-20 NOTE — ANESTHESIA POSTPROCEDURE EVALUATION
Post-Anesthesia Evaluation and Assessment    Patient: Sussy Davis MRN: [id number]  SSN: [id number]    YOB: 1955  Age: 57 y.o. Sex: female       Cardiovascular Function/Vital Signs  Visit Vitals    BP (!) 155/91 (BP 1 Location: Right arm, BP Patient Position: Supine)    Pulse 71    Temp 36.5 °C (97.7 °F)    Resp 15    Ht 5' 4.5\" (1.638 m)    Wt 72.1 kg (159 lb)    SpO2 97%    BMI 26.87 kg/m2       Patient is status post regional anesthesia for Procedure(s):  SOFT TISSUE MASS EXCISION RIGHT HAND. Nausea/Vomiting: None    Postoperative hydration reviewed and adequate. Pain:  Pain Scale 1: Numeric (0 - 10) (02/20/18 1514)  Pain Intensity 1: 0 (02/20/18 1514)   Managed    Neurological Status:   Neuro (WDL): Exceptions to WDL (02/20/18 1514)  Neuro  RUE Motor Response: Numbness (02/20/18 1514)   At baseline    Mental Status and Level of Consciousness: Arousable    Pulmonary Status:   O2 Device: Room air (02/20/18 1514)   Adequate oxygenation and airway patent    Complications related to anesthesia: None    Post-anesthesia assessment completed.  No concerns    Signed By: Cristi Clay MD     February 20, 2018

## 2018-02-20 NOTE — OP NOTES
[facility] REPORT    Valentino Crofts  MR#: [id number]  : 1955  ACCOUNT #: [de-identified]   DATE OF SERVICE: 2018    PREOPERATIVE DIAGNOSIS: Right hand soft tissue mass. POSTOPERATIVE DIAGNOSIS: Right hand soft tissue mass. PROCEDURE:  Right hand excision soft tissue mass. SURGEON: Uzma Boyd MD    ANESTHESIA: [place] block. COMPLICATIONS: None. SPECIMEN:  Excised mass for pathology. ESTIMATED BLOOD LOSS:  Minimal.    ASSISTANT:  None. MEDICATIONS:  Marcaine plain 0.25%, 20 mL. PROCEDURE IN DETAIL:  After discussion of risks, benefits and alternatives, the patient expressed understanding and strongly desired to proceed with surgical treatment. She was brought to the operating room and a John block anesthesia was administered. The right upper extremity was then prepped and draped in a normal sterile fashion. A time-out was then performed. The planned incision was marked directly overlying the soft tissue mass over the radial aspect of the index finger near the metacarpophalangeal joint. Incision was performed. The soft tissue mass appeared to be directly underlying the dermis and was not adherent to the underlying soft tissue. A tissue plane was developed from proximal to the soft tissue mass in order to delineate it from the overlying dermis. It was excised in 2 pieces and sent to pathology. There appeared to be two small venous structures in direct continuity with the mass, but no other neurovascular structures. The venous structures were treated with Bovie electrocautery. The mass did not extend into proximity to the radial digital neurovascular structures. The underlying soft tissue was normal to inspection. Palpation was performed to verify that there was no residual soft tissue mass. The wound was copiously irrigated and closed with running 4-0 subcuticular suture.   Local anesthetic was infiltrated proximal to the approach. Sterile dressings were applied. The patient tolerated the procedure well. The mass was sent for pathological evaluation. POSTOPERATIVE PLAN:  Early range of motion for contracture prevention. Routine wound care. Judicious use of analgesics by mouth. We will await pathology results.   This does not appear to be consistent with either a giant cell tumor, a tendon sheath or a ganglion cyst.      MD Arturo Stone / Sunshine  D: 02/20/2018 14:49     T: 02/20/2018 15:47  JOB #: [id number]

## 2018-02-20 NOTE — ANESTHESIA PROCEDURE NOTES
Peripheral Block    Start time: 2/20/2018 2:10 PM  End time: 2/20/2018 2:14 PM  Performed by: Izzy Pablo  Authorized by: Izzy Pablo       Pre-procedure:    Indications: primary anesthetic    Preanesthetic Checklist: patient identified, risks and benefits discussed, site marked, timeout performed, anesthesia consent given and patient being monitored    Timeout Time: 14:10          Block Type:   Block Type:  John block  Laterality:  Right  Patient Position:  Flat  Block Limb IV Checked: Yes    Esmarch exsanguination: Yes    Tourniquet Type:  Single  Tourniquet Location:  Below elbow  Tourniquet Inflated:  2/20/2018 2:11 PM  Inflation (mmHg):  250  Limb w/o Radial Pulse: Yes    Infused Agent:  Lidocaine 0.5%  Volume Infused (mL):  25  Tourniquet Deflated:  2/20/2018 2:35 PM    Assessment:    Injection Assessment:   Patient tolerance:  Patient tolerated the procedure well with no immediate complications

## 2018-04-19 PROBLEM — E78.5 HYPERLIPIDEMIA: Status: ACTIVE | Noted: 2018-04-19

## 2018-04-19 PROBLEM — I65.23 BILATERAL CAROTID ARTERY STENOSIS: Status: ACTIVE | Noted: 2018-04-19

## 2018-08-31 ENCOUNTER — HOSPITAL ENCOUNTER (OUTPATIENT)
Dept: LAB | Age: 63
Discharge: HOME | End: 2018-08-31
Payer: MEDICARE

## 2018-08-31 DIAGNOSIS — C50.919 INFILTRATING DUCT CARCINOMA (HCC): ICD-10-CM

## 2018-08-31 LAB
ALBUMIN SERPL-MCNC: 4 G/DL (ref 3.2–4.6)
ALBUMIN/GLOB SERPL: 1.1 {RATIO} (ref 1.2–3.5)
ALP SERPL-CCNC: 98 U/L (ref 50–136)
ALT SERPL-CCNC: 54 U/L (ref 12–65)
ANION GAP SERPL CALC-SCNC: 4 MMOL/L (ref 7–16)
AST SERPL-CCNC: 40 U/L (ref 15–37)
BASOPHILS # BLD: 0 K/UL (ref 0–0.2)
BASOPHILS NFR BLD: 1 % (ref 0–2)
BILIRUB SERPL-MCNC: 0.6 MG/DL (ref 0.2–1.1)
BUN SERPL-MCNC: 14 MG/DL (ref 8–23)
CALCIUM SERPL-MCNC: 9.3 MG/DL (ref 8.3–10.4)
CHLORIDE SERPL-SCNC: 106 MMOL/L (ref 98–107)
CO2 SERPL-SCNC: 30 MMOL/L (ref 21–32)
CREAT SERPL-MCNC: 1 MG/DL (ref 0.6–1)
DIFFERENTIAL METHOD BLD: ABNORMAL
EOSINOPHIL # BLD: 0 K/UL (ref 0–0.8)
EOSINOPHIL NFR BLD: 0 % (ref 0.5–7.8)
ERYTHROCYTE [DISTWIDTH] IN BLOOD BY AUTOMATED COUNT: 13.2 % (ref 11.9–14.6)
GLOBULIN SER CALC-MCNC: 3.8 G/DL (ref 2.3–3.5)
GLUCOSE SERPL-MCNC: 83 MG/DL (ref 65–100)
HCT VFR BLD AUTO: 44.6 % (ref 35.8–46.3)
HGB BLD-MCNC: 14.3 G/DL (ref 11.7–15.4)
IMM GRANULOCYTES # BLD: 0 K/UL (ref 0–0.5)
IMM GRANULOCYTES NFR BLD AUTO: 0 % (ref 0–5)
LYMPHOCYTES # BLD: 0.9 K/UL (ref 0.5–4.6)
LYMPHOCYTES NFR BLD: 17 % (ref 13–44)
MCH RBC QN AUTO: 29.4 PG (ref 26.1–32.9)
MCHC RBC AUTO-ENTMCNC: 32.1 G/DL (ref 31.4–35)
MCV RBC AUTO: 91.8 FL (ref 79.6–97.8)
MONOCYTES # BLD: 0.3 K/UL (ref 0.1–1.3)
MONOCYTES NFR BLD: 6 % (ref 4–12)
NEUTS SEG # BLD: 4 K/UL (ref 1.7–8.2)
NEUTS SEG NFR BLD: 76 % (ref 43–78)
NRBC # BLD: 0 K/UL (ref 0–0.2)
PLATELET # BLD AUTO: 192 K/UL (ref 150–450)
PMV BLD AUTO: 9.7 FL (ref 9.4–12.3)
POTASSIUM SERPL-SCNC: 3.8 MMOL/L (ref 3.5–5.1)
PROT SERPL-MCNC: 7.8 G/DL (ref 6.3–8.2)
RBC # BLD AUTO: 4.86 M/UL (ref 4.05–5.25)
SODIUM SERPL-SCNC: 140 MMOL/L (ref 136–145)
WBC # BLD AUTO: 5.3 K/UL (ref 4.3–11.1)

## 2018-08-31 PROCEDURE — 80053 COMPREHEN METABOLIC PANEL: CPT

## 2018-08-31 PROCEDURE — 85025 COMPLETE CBC W/AUTO DIFF WBC: CPT

## 2018-08-31 PROCEDURE — 36415 COLL VENOUS BLD VENIPUNCTURE: CPT

## 2018-12-12 PROBLEM — J30.9 ALLERGIC RHINITIS: Status: ACTIVE | Noted: 2018-12-12

## 2019-01-24 ENCOUNTER — HOSPITAL ENCOUNTER (OUTPATIENT)
Dept: LAB | Age: 64
Discharge: HOME | End: 2019-01-24

## 2019-01-24 PROCEDURE — 88305 TISSUE EXAM BY PATHOLOGIST: CPT

## 2019-10-23 ENCOUNTER — HOSPITAL ENCOUNTER (OUTPATIENT)
Dept: ULTRASOUND IMAGING | Age: 64
Discharge: HOME | End: 2019-10-23
Attending: INTERNAL MEDICINE
Payer: MEDICARE

## 2019-10-23 DIAGNOSIS — I65.23 BILATERAL CAROTID ARTERY STENOSIS: ICD-10-CM

## 2019-10-23 PROCEDURE — 93880 EXTRACRANIAL BILAT STUDY: CPT

## 2019-11-13 ENCOUNTER — HOSPITAL ENCOUNTER (OUTPATIENT)
Dept: MRI IMAGING | Age: 64
Discharge: HOME | End: 2019-11-13
Attending: INTERNAL MEDICINE
Payer: MEDICARE

## 2019-11-13 DIAGNOSIS — R29.898 WEAKNESS OF LEFT ARM: ICD-10-CM

## 2019-11-13 DIAGNOSIS — M54.2 NECK PAIN: ICD-10-CM

## 2019-11-13 PROCEDURE — 72141 MRI NECK SPINE W/O DYE: CPT

## 2019-11-14 NOTE — PROGRESS NOTES
Degenerative disease of the cervical spine with some neural foraminal narrowing. Further evaluation indicated if symptoms continue to localize to the left arm. See telephone visit for details about these test results.

## 2019-11-28 PROBLEM — M47.812 OSTEOARTHRITIS OF CERVICAL SPINE: Status: ACTIVE | Noted: 2019-11-28

## 2020-11-03 ENCOUNTER — HOSPITAL ENCOUNTER (OUTPATIENT)
Dept: MAMMOGRAPHY | Age: 65
Discharge: HOME | End: 2020-11-03
Attending: INTERNAL MEDICINE
Payer: MEDICARE

## 2020-11-03 DIAGNOSIS — M85.80 OSTEOPENIA, UNSPECIFIED LOCATION: ICD-10-CM

## 2020-11-03 DIAGNOSIS — Z78.0 POSTMENOPAUSAL: ICD-10-CM

## 2020-11-03 PROCEDURE — 77080 DXA BONE DENSITY AXIAL: CPT

## 2020-11-04 PROBLEM — K22.2 ESOPHAGEAL RING: Status: ACTIVE | Noted: 2020-11-04

## 2021-02-10 ENCOUNTER — ANESTHESIA EVENT (OUTPATIENT)
Dept: SURGERY | Age: 66
End: 2021-02-10
Payer: MEDICARE

## 2021-02-11 ENCOUNTER — ANESTHESIA (OUTPATIENT)
Dept: SURGERY | Age: 66
End: 2021-02-11
Payer: MEDICARE

## 2021-02-11 ENCOUNTER — HOSPITAL ENCOUNTER (OUTPATIENT)
Age: 66
Setting detail: OUTPATIENT SURGERY
Discharge: HOME | End: 2021-02-11
Attending: ORTHOPAEDIC SURGERY | Admitting: ORTHOPAEDIC SURGERY
Payer: MEDICARE

## 2021-02-11 VITALS
DIASTOLIC BLOOD PRESSURE: 82 MMHG | BODY MASS INDEX: 26.61 KG/M2 | HEART RATE: 72 BPM | TEMPERATURE: 97.8 F | WEIGHT: 155 LBS | RESPIRATION RATE: 16 BRPM | SYSTOLIC BLOOD PRESSURE: 159 MMHG | OXYGEN SATURATION: 97 %

## 2021-02-11 DIAGNOSIS — M19.049 CMC ARTHRITIS: Primary | ICD-10-CM

## 2021-02-11 PROCEDURE — 74011000250 HC RX REV CODE- 250: Performed by: ANESTHESIOLOGY

## 2021-02-11 PROCEDURE — 74011000250 HC RX REV CODE- 250: Performed by: NURSE ANESTHETIST, CERTIFIED REGISTERED

## 2021-02-11 PROCEDURE — A4565 SLINGS: HCPCS | Performed by: ORTHOPAEDIC SURGERY

## 2021-02-11 PROCEDURE — 76060000033 HC ANESTHESIA 1 TO 1.5 HR: Performed by: ORTHOPAEDIC SURGERY

## 2021-02-11 PROCEDURE — 76010000161 HC OR TIME 1 TO 1.5 HR INTENSV-TIER 1: Performed by: ORTHOPAEDIC SURGERY

## 2021-02-11 PROCEDURE — 77030002922 HC SUT FBRWRE ARTH -B: Performed by: ORTHOPAEDIC SURGERY

## 2021-02-11 PROCEDURE — 74011250636 HC RX REV CODE- 250/636: Performed by: NURSE ANESTHETIST, CERTIFIED REGISTERED

## 2021-02-11 PROCEDURE — 74011250636 HC RX REV CODE- 250/636: Performed by: ANESTHESIOLOGY

## 2021-02-11 PROCEDURE — 77030031139 HC SUT VCRL2 J&J -A: Performed by: ORTHOPAEDIC SURGERY

## 2021-02-11 PROCEDURE — 77030020275 HC MISC ORTHOPEDIC: Performed by: ORTHOPAEDIC SURGERY

## 2021-02-11 PROCEDURE — 77030006788 HC BLD SAW OSC STRY -B: Performed by: ORTHOPAEDIC SURGERY

## 2021-02-11 PROCEDURE — 76210000020 HC REC RM PH II FIRST 0.5 HR: Performed by: ORTHOPAEDIC SURGERY

## 2021-02-11 PROCEDURE — 77030000032 HC CUF TRNQT ZIMM -B: Performed by: ORTHOPAEDIC SURGERY

## 2021-02-11 PROCEDURE — 77030003602 HC NDL NRV BLK BBMI -B: Performed by: ANESTHESIOLOGY

## 2021-02-11 PROCEDURE — 77030029354 HC BLD MINI RND RBSI -A: Performed by: ORTHOPAEDIC SURGERY

## 2021-02-11 PROCEDURE — 74011000250 HC RX REV CODE- 250: Performed by: ORTHOPAEDIC SURGERY

## 2021-02-11 PROCEDURE — 74011250636 HC RX REV CODE- 250/636: Performed by: NURSE PRACTITIONER

## 2021-02-11 PROCEDURE — 77030002986 HC SUT PROL J&J -A: Performed by: ORTHOPAEDIC SURGERY

## 2021-02-11 PROCEDURE — 77030006689 HC BLD OPHTH BVR BD -A: Performed by: ORTHOPAEDIC SURGERY

## 2021-02-11 PROCEDURE — 2709999900 HC NON-CHARGEABLE SUPPLY: Performed by: ORTHOPAEDIC SURGERY

## 2021-02-11 PROCEDURE — 77030021122 HC SPLNT MAT FST BSNM -A: Performed by: ORTHOPAEDIC SURGERY

## 2021-02-11 PROCEDURE — 76942 ECHO GUIDE FOR BIOPSY: CPT | Performed by: ORTHOPAEDIC SURGERY

## 2021-02-11 PROCEDURE — 76210000006 HC OR PH I REC 0.5 TO 1 HR: Performed by: ORTHOPAEDIC SURGERY

## 2021-02-11 PROCEDURE — 77030003028 HC SUT VCRL J&J -A: Performed by: ORTHOPAEDIC SURGERY

## 2021-02-11 PROCEDURE — 76010010054 HC POST OP PAIN BLOCK: Performed by: ORTHOPAEDIC SURGERY

## 2021-02-11 RX ORDER — SODIUM CHLORIDE 0.9 % (FLUSH) 0.9 %
5-40 SYRINGE (ML) INJECTION AS NEEDED
Status: DISCONTINUED | OUTPATIENT
Start: 2021-02-11 | End: 2021-02-11 | Stop reason: HOSPADM

## 2021-02-11 RX ORDER — SODIUM CHLORIDE, SODIUM LACTATE, POTASSIUM CHLORIDE, CALCIUM CHLORIDE 600; 310; 30; 20 MG/100ML; MG/100ML; MG/100ML; MG/100ML
100 INJECTION, SOLUTION INTRAVENOUS CONTINUOUS
Status: DISCONTINUED | OUTPATIENT
Start: 2021-02-11 | End: 2021-02-11 | Stop reason: HOSPADM

## 2021-02-11 RX ORDER — DIPHENHYDRAMINE HYDROCHLORIDE 50 MG/ML
12.5 INJECTION, SOLUTION INTRAMUSCULAR; INTRAVENOUS
Status: DISCONTINUED | OUTPATIENT
Start: 2021-02-11 | End: 2021-02-11 | Stop reason: HOSPADM

## 2021-02-11 RX ORDER — OXYCODONE HYDROCHLORIDE 5 MG/1
10 TABLET ORAL
Status: DISCONTINUED | OUTPATIENT
Start: 2021-02-11 | End: 2021-02-11 | Stop reason: HOSPADM

## 2021-02-11 RX ORDER — SODIUM CHLORIDE 0.9 % (FLUSH) 0.9 %
5-40 SYRINGE (ML) INJECTION EVERY 8 HOURS
Status: DISCONTINUED | OUTPATIENT
Start: 2021-02-11 | End: 2021-02-11 | Stop reason: HOSPADM

## 2021-02-11 RX ORDER — HYDROMORPHONE HYDROCHLORIDE 2 MG/ML
0.5 INJECTION, SOLUTION INTRAMUSCULAR; INTRAVENOUS; SUBCUTANEOUS
Status: DISCONTINUED | OUTPATIENT
Start: 2021-02-11 | End: 2021-02-11 | Stop reason: HOSPADM

## 2021-02-11 RX ORDER — LIDOCAINE HYDROCHLORIDE 20 MG/ML
INJECTION, SOLUTION EPIDURAL; INFILTRATION; INTRACAUDAL; PERINEURAL AS NEEDED
Status: DISCONTINUED | OUTPATIENT
Start: 2021-02-11 | End: 2021-02-11 | Stop reason: HOSPADM

## 2021-02-11 RX ORDER — CEFAZOLIN SODIUM/WATER 2 G/20 ML
2 SYRINGE (ML) INTRAVENOUS ONCE
Status: DISCONTINUED | OUTPATIENT
Start: 2021-02-11 | End: 2021-02-11 | Stop reason: SDUPTHER

## 2021-02-11 RX ORDER — NALOXONE HYDROCHLORIDE 0.4 MG/ML
0.1 INJECTION, SOLUTION INTRAMUSCULAR; INTRAVENOUS; SUBCUTANEOUS AS NEEDED
Status: DISCONTINUED | OUTPATIENT
Start: 2021-02-11 | End: 2021-02-11 | Stop reason: HOSPADM

## 2021-02-11 RX ORDER — ALBUTEROL SULFATE 0.83 MG/ML
2.5 SOLUTION RESPIRATORY (INHALATION) AS NEEDED
Status: DISCONTINUED | OUTPATIENT
Start: 2021-02-11 | End: 2021-02-11 | Stop reason: HOSPADM

## 2021-02-11 RX ORDER — LIDOCAINE HYDROCHLORIDE 10 MG/ML
0.1 INJECTION INFILTRATION; PERINEURAL AS NEEDED
Status: DISCONTINUED | OUTPATIENT
Start: 2021-02-11 | End: 2021-02-11 | Stop reason: HOSPADM

## 2021-02-11 RX ORDER — PROPOFOL 10 MG/ML
INJECTION, EMULSION INTRAVENOUS AS NEEDED
Status: DISCONTINUED | OUTPATIENT
Start: 2021-02-11 | End: 2021-02-11 | Stop reason: HOSPADM

## 2021-02-11 RX ORDER — MIDAZOLAM HYDROCHLORIDE 1 MG/ML
2 INJECTION, SOLUTION INTRAMUSCULAR; INTRAVENOUS ONCE
Status: COMPLETED | OUTPATIENT
Start: 2021-02-11 | End: 2021-02-11

## 2021-02-11 RX ORDER — FENTANYL CITRATE 50 UG/ML
100 INJECTION, SOLUTION INTRAMUSCULAR; INTRAVENOUS ONCE
Status: DISCONTINUED | OUTPATIENT
Start: 2021-02-11 | End: 2021-02-11 | Stop reason: HOSPADM

## 2021-02-11 RX ORDER — ONDANSETRON 4 MG/1
4 TABLET, FILM COATED ORAL
Qty: 20 TAB | Refills: 0 | Status: SHIPPED | OUTPATIENT
Start: 2021-02-11 | End: 2021-03-10 | Stop reason: CLARIF

## 2021-02-11 RX ORDER — OXYCODONE HYDROCHLORIDE 5 MG/1
5 TABLET ORAL
Status: DISCONTINUED | OUTPATIENT
Start: 2021-02-11 | End: 2021-02-11 | Stop reason: HOSPADM

## 2021-02-11 RX ORDER — BUPIVACAINE HYDROCHLORIDE 2.5 MG/ML
INJECTION, SOLUTION EPIDURAL; INFILTRATION; INTRACAUDAL AS NEEDED
Status: DISCONTINUED | OUTPATIENT
Start: 2021-02-11 | End: 2021-02-11 | Stop reason: HOSPADM

## 2021-02-11 RX ORDER — MIDAZOLAM HYDROCHLORIDE 1 MG/ML
2 INJECTION, SOLUTION INTRAMUSCULAR; INTRAVENOUS
Status: DISCONTINUED | OUTPATIENT
Start: 2021-02-11 | End: 2021-02-11 | Stop reason: HOSPADM

## 2021-02-11 RX ORDER — PROPOFOL 10 MG/ML
INJECTION, EMULSION INTRAVENOUS
Status: DISCONTINUED | OUTPATIENT
Start: 2021-02-11 | End: 2021-02-11 | Stop reason: HOSPADM

## 2021-02-11 RX ORDER — CEFAZOLIN SODIUM/WATER 2 G/20 ML
2 SYRINGE (ML) INTRAVENOUS ONCE
Status: COMPLETED | OUTPATIENT
Start: 2021-02-11 | End: 2021-02-11

## 2021-02-11 RX ORDER — OXYCODONE HYDROCHLORIDE 5 MG/1
5 TABLET ORAL
Qty: 28 TAB | Refills: 0 | Status: SHIPPED | OUTPATIENT
Start: 2021-02-11 | End: 2021-02-16

## 2021-02-11 RX ORDER — BUPIVACAINE HYDROCHLORIDE AND EPINEPHRINE 5; 5 MG/ML; UG/ML
INJECTION, SOLUTION EPIDURAL; INTRACAUDAL; PERINEURAL
Status: COMPLETED | OUTPATIENT
Start: 2021-02-11 | End: 2021-02-11

## 2021-02-11 RX ADMIN — PROPOFOL 30 MG: 10 INJECTION, EMULSION INTRAVENOUS at 09:41

## 2021-02-11 RX ADMIN — Medication 2 G: at 09:22

## 2021-02-11 RX ADMIN — LIDOCAINE HYDROCHLORIDE 40 MG: 20 INJECTION, SOLUTION EPIDURAL; INFILTRATION; INTRACAUDAL; PERINEURAL at 09:23

## 2021-02-11 RX ADMIN — MIDAZOLAM 2 MG: 1 INJECTION INTRAMUSCULAR; INTRAVENOUS at 08:52

## 2021-02-11 RX ADMIN — PROPOFOL 120 MCG/KG/MIN: 10 INJECTION, EMULSION INTRAVENOUS at 09:21

## 2021-02-11 RX ADMIN — MEPIVACAINE HYDROCHLORIDE 20 ML: 20 INJECTION, SOLUTION EPIDURAL; INFILTRATION at 08:56

## 2021-02-11 RX ADMIN — BUPIVACAINE HYDROCHLORIDE AND EPINEPHRINE BITARTRATE 10 ML: 5; .005 INJECTION, SOLUTION EPIDURAL; INTRACAUDAL; PERINEURAL at 08:56

## 2021-02-11 RX ADMIN — SODIUM CHLORIDE, SODIUM LACTATE, POTASSIUM CHLORIDE, AND CALCIUM CHLORIDE: 600; 310; 30; 20 INJECTION, SOLUTION INTRAVENOUS at 09:16

## 2021-02-11 RX ADMIN — PROPOFOL 30 MG: 10 INJECTION, EMULSION INTRAVENOUS at 09:23

## 2021-02-11 RX ADMIN — SODIUM CHLORIDE, SODIUM LACTATE, POTASSIUM CHLORIDE, AND CALCIUM CHLORIDE 100 ML/HR: 600; 310; 30; 20 INJECTION, SOLUTION INTRAVENOUS at 08:42

## 2021-02-11 NOTE — DISCHARGE INSTRUCTIONS
Postoperative  Instructions:      Weightbearing or Lifting: You  are  not  allowed  to  lift  any  weight  or  bear  any  weight  on  the  surgical  extremity  until  cleared  by  your  surgeon. Dressing  instructions:    Keep  your  dressing  and/or  splint  clean  and  dry  at  all  times. It  will  be  removed  at  your  first  post-operative  appointment. Your  stitches  will  be  removed  at  this  visit. Showering  Instructions:  May  shower  But keep surgical dressing clean and dry until removed as explained above. Pain  Control:  - You  have  been  given  a  prescription  to  be  taken  as  directed  for  post-operative  pain  control. In  addition,  elevate  the  operative  extremity  above  the  heart  at  all  times  to  prevent  swelling  and  throbbing  pain. - If you develop constipation while taking narcotic pain medications (Norco, Hydrocodone, Percocet, Oxycodone, Dilaudid, Hydromorphone) take  over-the-counter  Colace,  100mg  by  mouth  twice  a  Day. - Nausea  is  a  common  side  effect  of  many  pain  medications. You  will  want  to  eat something  before  taking  your  pain  medicine  to  help  prevent  Nausea. - If  you  are  taking  a  prescription  pain  medication  that  contains  acetaminophen,  we  recommend  that  you  do  not  take  additional  over  the  counter  acetaminophen  (Tylenol®). Other  pain  relieving  options:   - Using  a  cold  pack  to  ice  the  affected  area  a  few  times  a  day  (15  to  20  minutes  at  a  time)  can  help  to  relieve  pain,  reduce  swelling  and  bruising.      - Elevation  of  the  affected  area  can  also  help  to  reduce  pain  and  swelling. Did  you  receive  a  nerve  Block? A  nerve  block  can  provide  pain  relief  for  one  hour  to  two  days  after  your  surgery.   As  long  as  the  nerve  block  is  working,  you  will  experience  little  or  no  sensation  in  the  area  the surgeon  operated  on. As  the  nerve  block  wears  off,  you  will  begin  to  experience  pain  or  discomfort. It  is  very  important  that  you  begin  taking  your  prescribed  pain  medication  before  the  nerve  block  fully  wears  off. The first sign that the nerve block is wearing off is tingling in your fingers. Treating  your  pain  at  the  first  sign  of  the  block  wearing  off  will  ensure  your  pain  is  better  controlled  and  more  tolerable  when  full-sensation  returns. Do  not  wait  until  the  pain  is  intolerable,  as  the  medicine  will  be  less  effective. It  is  better  to  treat  pain  in  advance  than  to  try  and  catch  up. General  Anesthesia or Sedation:      If  you  did  not  receive  a  nerve  block  during  your  surgery,  you  will  need  to  start  taking  your  pain  medication  shortly  after  your  surgery  and  should  continue  to  do  so  as  prescribed  by  your  surgeon. Please  call  [phone]  with any concern and ask to speak with Shyann Vasquez. Concerning problems include:      -  Excessive  redness  of  the  incisions      -  Drainage  for  more  than  2  Days after surgery or any foul smelling drainage  -  Fever  of  more  than  101.5  F      Please  call  [phone]  if  you  do  not  receive  or  are  unsure  of  your  first  follow-up  appointment. You  should  see  the  doctor  10-14  days  after  your  Surgery. Thank you for choosing me and [address] [place] [address] for your care. I will go above and beyond to ensure you receive the best care possible. Sachin Matamoros MD, PhD      INSTRUCTIONS FOLLOWING FOOT SURGERY    ACTIVITY  Elevate foot (feet) for 48 hours. Given post op shoe  Weight bearing as tolerated in post op shoe. DIET  Clear liquids until no nausea or vomiting; then light diet for the first day. Advance to regular diet on second day, unless your doctor orders otherwise.     PAIN  Take pain medications as directed by your doctor. Call your doctor if pain is NOT relieved by medication. DO NOT take aspirin or blood thinners until directed by your doctor. DRESSING CARE      FOLLOW-UP PHONE CALLS  Calls will be made by nursing staff. If you have any problems, call your doctor as needed. CALL YOUR DOCTOR IF YOU HAVE  Excessive bleeding that does not stop after holding mild pressure over the area. Temperature of 101 degrees or above. Redness, excessive swelling or bruising, and/or green or yellow, smelly discharge from incision. Loss of sensation - cold, white or blue toes. AFTER ANESTHESIA  For the first 24 hours and while taking narcotics for pain: DO NOT Drive, Drink Alcoholic beverages, or make important Decisions. Be aware of dizziness following anesthesia and while taking pain medication.     OTHER INSTRUCTIONS    APPOINTMENT DATE/TIME_________________________________    Judie Saul DOCTOR'S PHONE NUMBER__________________________

## 2021-02-11 NOTE — ANESTHESIA POSTPROCEDURE EVALUATION
Procedure(s):  RIGHT THUMB - ARTHROPLASTY / THUMB CYST EXCISION  BILATERAL GREAT TOENAIL AVULSIONS.    total IV anesthesia    Anesthesia Post Evaluation      Multimodal analgesia: multimodal analgesia used between 6 hours prior to anesthesia start to PACU discharge  Patient location during evaluation: PACU  Patient participation: complete - patient participated  Level of consciousness: awake and awake and alert  Pain management: adequate  Airway patency: patent  Anesthetic complications: no  Cardiovascular status: acceptable  Respiratory status: acceptable  Hydration status: acceptable  Post anesthesia nausea and vomiting:  controlled      INITIAL Post-op Vital signs:   Vitals Value Taken Time   /82 02/11/21 1112   Temp 36.4 °C (97.6 °F) 02/11/21 1022   Pulse 72 02/11/21 1112   Resp 16 02/11/21 1046   SpO2 97 % 02/11/21 1112   Vitals shown include unvalidated device data.

## 2021-02-11 NOTE — BRIEF OP NOTE
Brief Postoperative Note    Patient: Melody Wilde  YOB: 1955  MRN: [id number]    Date of Procedure: 2/11/2021     Pre-Op Diagnosis: Osteoarthritis of carpometacarpal Stanton) joint of right thumb, unspecified osteoarthritis type [M18.11]  Digital mucinous cyst of finger of right hand [M67.441]  Ingrown toenail [L60.0]    Post-Op Diagnosis: Same as preoperative diagnosis.       Procedure(s):    BILATERAL GREAT TOENAIL AVULSIONS      Surgical Assistant: None    Anesthesia: Regional     Estimated Blood Loss (mL): Minimal    Complications: None    Specimens: * No specimens in log *     Implants: * No implants in log *    Drains: * No LDAs found *    Findings:    Electronically Signed by Danna Wadsworth MD on 2/11/2021 at 2:29 PM

## 2021-02-11 NOTE — ANESTHESIA PROCEDURE NOTES
Peripheral Block    Start time: 2/11/2021 8:52 AM  End time: 2/11/2021 8:56 AM  Performed by: Karishma Hernandez MD  Authorized by: Karishma Hernandez MD       Pre-procedure:    Indications: at surgeon's request and post-op pain management    Preanesthetic Checklist: patient identified, risks and benefits discussed, site marked, timeout performed, anesthesia consent given and patient being monitored    Timeout Time: 08:52          Block Type:   Block Type:  Axillary  Laterality:  Right  Monitoring:  Standard ASA monitoring, continuous pulse ox, frequent vital sign checks, heart rate, oxygen and responsive to questions  Injection Technique:  Single shot  Procedures: ultrasound guided    Patient Position: supine  Prep: chlorhexidine    Location:  Axilla  Needle Type:  Stimuplex  Needle Gauge:  21 G  Needle Localization:  Ultrasound guidance and anatomical landmarks  Medication Injected:  Bupivacaine-EPINEPHrine (PF)(SENSORCAINE) 0.5%-1:200,000 mg injection, 10 mL  mepivacaine (PF) 2 % (POLOCAINE) injection, 20 mL  Med Admin Time: 2/11/2021 8:56 AM    Assessment:  Number of attempts:  1  Injection Assessment:  Incremental injection every 5 mL, local visualized surrounding nerve on ultrasound, negative aspiration for blood, no paresthesia, no intravascular symptoms and ultrasound image on chart  Patient tolerance:  Patient tolerated the procedure well with no immediate complications

## 2021-02-11 NOTE — PERIOP NOTES
Discharge instructions given to patient's , Graciela Luke, at bedside. Verbalized understanding. No questions at this time.

## 2021-02-11 NOTE — OP NOTES
ORTHOPAEDIC SURGICAL NOTE        Monica Ward female 77 y.o.  [id number]   2/11/2021     PRE-OP DIAGNOSIS: Osteoarthritis of carpometacarpal Benton) joint of right thumb, unspecified osteoarthritis type [M18.11]  Digital mucinous cyst of finger of right hand [M67.441]  Ingrown toenail [L60.0]   POST-OP DIAGNOSIS: Osteoarthritis of carpometacarpal Benton) joint of right thumb, unspecified osteoarthritis type [M18.11]  Digital mucinous cyst of finger of right hand [M67.441]  Ingrown toenail [L60.0]   LATERALITY: Right      PROCEDURES PERFORMED:   Right Thumb Trapeziectomy and Arthroplasty with FCR-APL tendon interposition, right thumb IP joint mucous cyst excision, arthrotomy and cheilectomy (53446, 80168, 82123, 94188)       SURGEON:   Juan Luis Villareal MD, PhD     ANESTHESIA: Regional     STAFF:    Circ-1: Adrianna Fernandez RN  Scrub Tech-1: Quinten Benites, [address] [place] [address] BLOOD LOSS: Minimal       TOTAL IV FLUIDS : See anesthesia note    COMPLICATIONS: None     TOURNIQUET TIME:   Total Tourniquet Time Documented:  Upper Arm (Right) - 45 minutes  Total: Upper Arm (Right) - 45 minutes       INDICATION FOR PROCEDURE:     Monica Ward has longstanding Right  Bilateral thumb CMC arthritis recalcitrant to conservative measures including braces, oral and topical NSAIDs and, steroid injections. Surgical and non-surgical treatment options were discussed with the patient and their family, as well as the risk and benefits of each option. After thorough discussion, the patient decided to proceed with surgical management. Specific to this treatment plan, we discussed in detail surgical risks including scar, pain, bleeding, infection, anesthetic risks, neurovascular injury, failure to achieve desired results, hardware problems, need for further surgery,  weakness, stiffness, risk of death and potential risk of other unforseen complication.        The Patient consented to the procedure after discussion of the risks and benefits. DESCRIPTION OF PROCEDURE:     The patient was identified in the holding room. The Right thumb was marked and confirmed as the correct operative site. They were then brought to the OR and general anesthesia was induced. They were transferred onto the OR table in the supine position. All bony prominences were well padded. SCDs were placed on the bilateral legs throughout the case. A timeout was performed, verifying the correct patient, the correct side being the Right  Bilateral side and the correct procedure. Antibiotics were then administered, and were redosed during the procedure as needed at indicated intervals. A non-sterile tourniquet was placed on the Right arm. The Right upper extremity was pre scrubbed and then prepped and draped in routine sterile fashion. An incisional timeout was performed re-confirming the correct patient, surgical site and procedure, as well as verifying antibiotics. An H shaped incision was made over the right thumb IP joint, sharp dissection was carried down to the extensor tendon, the mucous cyst was sharply excised along with its stalk, 2 arthrotomies were made, one on the ulnar side and one on the radial side of the extensor tendon, using a rondure the dorsal osteophyte at the distal aspect of the proximal phalanx was removed until a smooth surface was obtained and good joint gliding was evident. Attention was brought to the [facility] arthroplasty. A Lazo incision mas made over the Right 1st CMC joint, the radial sensory nerve was identified and protected, the interval between the APL and Thenar Muscles was identified and used to approach the [facility] joint. The radial artery was identified, articular branches cauterized, the artery was mobilized and protected. The [facility] joint was incised longitudinally, the trapezium was sharply dissected with Tammy retractors and knife.  The trapezium was cut with an oscillating saw in 4 quadrants and finished with osteotomes. The fragments were retrieved with Rongeur until no bony fragments were present. The Scapho-Trapezoid joint was inspected and in good shape. Using a #0 fiberwire, a suspension-interposition was made between the insertion of the APL tendon to the insertion of the FCR tendon while maintaining the thumb abducted and with partial traction. After suspension-interposition the thumb was in good position and the arthroplasty space was well maintained. The capsule was imbricated and closed with 3-0 PDS. Wound was irrigated, tourniquet released and hemostasis was obtained with bipolar cautery. The wound was closed in layers with 4-0 vicryl, 3-0 prolene and thumb splint applied. Dr. Lukas Benoit performed bilateral great toe nail avulsions, this is dictated separately by him. Disposition: To PACU with no complications and follow up per routine. Patient is instructed to keep splint on and avoid lifting with the left hand.        Patricia Galicia MD    02/11/21  2:52 PM

## 2021-02-11 NOTE — H&P
History and Physical    Subjective:     Giovanni Nova is a 77 y.o. with longstanding history of right thumb CMC joint arthritis and cyst over the DIP joint, she is scheduled for surgery today, there is no change in history since last seen in clinic. Patient also has bilateral ingrown toenails that Dr Max Smith will remove today.     Past Medical History:   Diagnosis Date    Adverse effect of anesthesia     BP plummets intraop usually    Allergic rhinitis 12/12/2018    Arthritis     DDD    Bilateral carotid artery stenosis 4/19/2018    Cancer (HCC)     breast, left    Chronic pain     pt denies presently- lumbar sometimes    GERD (gastroesophageal reflux disease)     well controlled    History of breast cancer 2/1/2018    ER/HI positive T2 NxER+HI+ double mastectomy 2006    HTN (hypertension)     120s/80s- controlled with med    Hyperlipidemia 4/19/2018    Latex allergy 2/1/2018    Osteoarthritis of cervical spine 11/28/2019    Thromboembolus ([address]     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 Stanley Bhardwaj - sent for scanning    HX COLONOSCOPY  2019    wnl repeat 5 yrs    HX ENDOSCOPY  04/12/2007 4- - EGD - hiatal hernia and schatzki's ring no active esophagitis, gastritis or acid peptic injury - Dr. Hernandez [facility] - sent for scanning    HX ENDOSCOPY  2020    HX HEENT  02/2011    thyroglossal cyst removed    HX HYSTERECTOMY      HX MASTECTOMY Bilateral     x2    HX TONSILLECTOMY      HI ABDOMEN SURGERY PROC UNLISTED      breast breast reconstruction with abdominal liposuction     Family History   Problem Relation Age of Onset    Stroke Mother     Breast Cancer Mother     Colon Cancer Father     Hypertension Brother     Heart Disease Brother         due to rheumatic fever-valve surgery    Cancer Maternal Uncle     Cancer Maternal Grandfather     Hypertension Paternal Grandmother     Stroke Paternal Grandmother     Heart Disease Paternal Grandfather     Hypertension Brother     Malignant Hyperthermia Neg Hx     Pseudocholinesterase Deficiency Neg Hx     Delayed Awakening Neg Hx     Post-op Nausea/Vomiting Neg Hx     Emergence Delirium Neg Hx     Post-op Cognitive Dysfunction Neg Hx     Other Neg Hx     Coronary Artery Disease Neg Hx       Social History     Tobacco Use    Smoking status: Never Smoker    Smokeless tobacco: Never Used   Substance Use Topics    Alcohol use: No       Prior to Admission medications    Medication Sig Start Date End Date Taking? Authorizing Provider   metoprolol succinate (TOPROL-XL) 50 mg XL tablet Take 1 Tab by mouth daily. 11/4/20   Javier Hylton MD   omeprazole (PRILOSEC) 20 mg capsule Take 1 Cap by mouth daily. Take 30 minutes prior to supper 11/4/20   Javier Hylton MD   atorvastatin (LIPITOR) 10 mg tablet Take 1 Tab by mouth daily. Patient taking differently: Take 10 mg by mouth nightly. 11/4/20   Javier Hylton MD   fluticasone propionate (FLONASE) 50 mcg/actuation nasal spray 2 Sprays by Both Nostrils route daily. 11/4/20   Javier Hylton MD   diclofenac (VOLTAREN) 1 % gel Apply  to affected area four (4) times daily as needed (foot pain). 11/4/20   Javier Hylton MD   folic acid 819 mcg tablet Take 400 mcg by mouth daily. Provider, Historical   magnesium 250 mg tab Take 1 daily 3/17/20   Tanner De La Rosa MD   aspirin delayed-release 81 mg tablet Take 1 Tab by mouth daily. Tanner De La Rosa MD   phytonadione, vitamin K1, (MEPHYTON) 5 mg tablet Take  by mouth now. Provider, Historical   garlic 1 mg cap Take  by mouth nightly. Provider, Historical   OTHER,NON-FORMULARY, Take  by mouth nightly.  Alpha lipoic acid 200mg daily     Provider, Historical   ascorbic acid, vitamin C, (VITAMIN C) 500 mg tablet Take  by mouth. Provider, Historical   ECHINACEA PO Take 750 mg by mouth nightly. Provider, Historical   CALCIUM CITRATE/VITAMIN D3 (CALCIUM CITRATE + D PO) Take 1 Cap by mouth two (2) times a day. 630 mg and 500 iu     Provider, Historical   vitamin c-vitamin e (CRANBERRY CONCENTRATE) cap Take 168 mg by mouth nightly. Provider, Historical   MULTIVITAMIN/IRON/FOLIC ACID (CEROVITE ADVANCED FORMULA PO) Take 2 Tabs by mouth daily. Provider, Historical   OLIVE LEAF EXTRACT PO Take 150 mg by mouth daily. Provider, Historical   CYANOCOBALAMIN (VITAMIN B-12 PO) Take 2,500 mcg by mouth daily. Provider, Historical   GINKGO BILOBA (GINKOBA PO) Take 120 mg by mouth two (2) times a day. 4/22/10   Provider, Historical     Allergies   Allergen Reactions    Latex Anaphylaxis and Rash     The anaphylactic reaction was with latex paint    Aleve [Naproxen Sodium] Swelling     Facial/edema swellingbut can take ibuprofen without difficulty since this time    Zestril [Lisinopril] Anaphylaxis    Zofran [Ondansetron Hcl] Rash     Intense vasodilation with redness, hypotension, responded to epinephrine    Amlodipine Swelling     ankles swell        Review of Systems:  A comprehensive review of systems was negative except for that written in the History of Present Illness. Objective: Intake and Output:    No intake/output data recorded. No intake/output data recorded. Physical Exam:   No acute distress, heart with regular rate and rhythm, lungs clear to auscultation.     Examination of the right hand demonstrates normal sensation in all fingers, severe tenderness palpation of the right thumb CMC joint, palpable mucous cyst over the IP joint of the thumb    Ingrown bilateral great toenails without infection    ECG:       Data Review:   No results found for this or any previous visit (from the past 24 hour(s)). Chest x-ray . Assessment:     Active Problems:    * No active hospital problems. *      Plan:     Plan for right thumb trapeziectomy and suspension arthroplasty, mucous cyst excision and possible cheilectomy of the IP joint.     Dr Verlan Schaumann will perform bilateral ingrown toenail excisions    Michael Bermudez MD, PhD  Orthopaedic Surgery  02/11/21  7:19 AM

## 2021-02-11 NOTE — ANESTHESIA PREPROCEDURE EVALUATION
Anesthetic History               Review of Systems / Medical History  Patient summary reviewed and pertinent labs reviewed    Pulmonary                   Neuro/Psych              Cardiovascular    Hypertension: well controlled                   GI/Hepatic/Renal     GERD: well controlled           Endo/Other        Arthritis and cancer     Other Findings   Comments: Left breast cancer 12 years ago           Physical Exam    Airway  Mallampati: I  TM Distance: > 6 cm  Neck ROM: normal range of motion   Mouth opening: Normal     Cardiovascular  Regular rate and rhythm,  S1 and S2 normal,  no murmur, click, rub, or gallop  Rhythm: regular  Rate: normal         Dental  No notable dental hx       Pulmonary  Breath sounds clear to auscultation               Abdominal         Other Findings            Anesthetic Plan    ASA: 2  Anesthesia type: total IV anesthesia      Post-op pain plan if not by surgeon: peripheral nerve block single    Induction: Intravenous  Anesthetic plan and risks discussed with: Patient

## 2021-02-11 NOTE — OP NOTES
FULL OP NOTE    PATIENT NAME: Zhen Milner  MRN: [id number]    DATE OF SURGERY: 2/11/2021    PREOPERATIVE DIAGNOSIS:   Ingrown toenail [L60.0]      POSTOPERATIVE DIAGNOSIS:   Ingrown toenail [L60.0]      PROCEDURE: Bilateral great toenail avulsions, 87294S8    SURGEON: Napoleon Wells MD    HARDWARE: * No implants in log *  INDICATIONS: This patient is a 77y.o. year old female with a history of Osteoarthritis of carpometacarpal (CMC) joint of right thumb, unspecified osteoarthritis type [M18.11]  Digital mucinous cyst of finger of right hand [M67.441]  Ingrown toenail [L60.0] who has failed conservative therapy and desires surgical treatment. Risks and benefits of the procedure including, but not limited to, anesthetic complications as well as surgical complications including damage to nerves and blood vessels, risk of infection, risk of incomplete pain relief, risk of malunion, nonunion and need for additional surgery have been discussed with the patient who wishes to proceed. PROCEDURE IN DETAIL: A time out was done to confirm the operating procedure, surgeon, patient and site. During a preop surgical timeout the correct operative sites were identified and prepped and draped in a standard sterile fashion using ChloraPrep solution. Field blocks were obtained with 0.5% plain Marcaine of both great toes. Both great toenails were then avulsed without difficulty. There is no sign of deep purulence identified. Sterile dressings were applied to both great toes. At that point I left the operating room while Dr. Nasim Tam continue to work on the patient's right thumb and spoke with the patient's  regarding her postoperative care. TOURNIQUET TIME: Approx 0 minutes. SPECIMENS: none    ESTIMATED BLOOD LOSS: min  mL.

## 2021-03-10 ENCOUNTER — ANESTHESIA EVENT (OUTPATIENT)
Dept: SURGERY | Age: 66
End: 2021-03-10
Payer: MEDICARE

## 2021-03-10 PROBLEM — M19.041 ARTHRITIS OF FINGER OF RIGHT HAND: Status: ACTIVE | Noted: 2021-03-10

## 2021-03-10 PROBLEM — M18.11 ARTHRITIS OF CARPOMETACARPAL (CMC) JOINT OF RIGHT THUMB: Status: ACTIVE | Noted: 2021-03-10

## 2021-03-11 ENCOUNTER — HOSPITAL ENCOUNTER (OUTPATIENT)
Age: 66
Setting detail: OUTPATIENT SURGERY
Discharge: HOME | End: 2021-03-11
Attending: ORTHOPAEDIC SURGERY | Admitting: ORTHOPAEDIC SURGERY
Payer: MEDICARE

## 2021-03-11 ENCOUNTER — ANESTHESIA (OUTPATIENT)
Dept: SURGERY | Age: 66
End: 2021-03-11
Payer: MEDICARE

## 2021-03-11 ENCOUNTER — APPOINTMENT (OUTPATIENT)
Dept: GENERAL RADIOLOGY | Age: 66
End: 2021-03-11
Attending: ORTHOPAEDIC SURGERY
Payer: MEDICARE

## 2021-03-11 VITALS
RESPIRATION RATE: 16 BRPM | DIASTOLIC BLOOD PRESSURE: 76 MMHG | HEART RATE: 73 BPM | SYSTOLIC BLOOD PRESSURE: 133 MMHG | TEMPERATURE: 97.6 F | OXYGEN SATURATION: 98 %

## 2021-03-11 DIAGNOSIS — M20.11 HALLUX VALGUS (ACQUIRED), RIGHT FOOT: ICD-10-CM

## 2021-03-11 DIAGNOSIS — M20.21 HALLUX RIGIDUS OF RIGHT FOOT: Primary | ICD-10-CM

## 2021-03-11 PROCEDURE — 74011250637 HC RX REV CODE- 250/637: Performed by: ANESTHESIOLOGY

## 2021-03-11 PROCEDURE — 77030006788 HC BLD SAW OSC STRY -B: Performed by: ORTHOPAEDIC SURGERY

## 2021-03-11 PROCEDURE — 74011250636 HC RX REV CODE- 250/636: Performed by: ANESTHESIOLOGY

## 2021-03-11 PROCEDURE — 28289 CORRJ HALUX RIGDUS W/O IMPLT: CPT | Performed by: ORTHOPAEDIC SURGERY

## 2021-03-11 PROCEDURE — C1713 ANCHOR/SCREW BN/BN,TIS/BN: HCPCS | Performed by: ORTHOPAEDIC SURGERY

## 2021-03-11 PROCEDURE — 77030040922 HC BLNKT HYPOTHRM STRY -A: Performed by: NURSE ANESTHETIST, CERTIFIED REGISTERED

## 2021-03-11 PROCEDURE — 74011250636 HC RX REV CODE- 250/636: Performed by: NURSE ANESTHETIST, CERTIFIED REGISTERED

## 2021-03-11 PROCEDURE — 74011250636 HC RX REV CODE- 250/636: Performed by: ORTHOPAEDIC SURGERY

## 2021-03-11 PROCEDURE — 77030002916 HC SUT ETHLN J&J -A: Performed by: ORTHOPAEDIC SURGERY

## 2021-03-11 PROCEDURE — 76942 ECHO GUIDE FOR BIOPSY: CPT | Performed by: ORTHOPAEDIC SURGERY

## 2021-03-11 PROCEDURE — 76210000063 HC OR PH I REC FIRST 0.5 HR: Performed by: ORTHOPAEDIC SURGERY

## 2021-03-11 PROCEDURE — 76010010054 HC POST OP PAIN BLOCK: Performed by: ORTHOPAEDIC SURGERY

## 2021-03-11 PROCEDURE — 76010000160 HC OR TIME 0.5 TO 1 HR INTENSV-TIER 1: Performed by: ORTHOPAEDIC SURGERY

## 2021-03-11 PROCEDURE — 77030002933 HC SUT MCRYL J&J -A: Performed by: ORTHOPAEDIC SURGERY

## 2021-03-11 PROCEDURE — 2709999900 HC NON-CHARGEABLE SUPPLY: Performed by: ORTHOPAEDIC SURGERY

## 2021-03-11 PROCEDURE — 76210000021 HC REC RM PH II 0.5 TO 1 HR: Performed by: ORTHOPAEDIC SURGERY

## 2021-03-11 PROCEDURE — 76060000032 HC ANESTHESIA 0.5 TO 1 HR: Performed by: ORTHOPAEDIC SURGERY

## 2021-03-11 PROCEDURE — 77030003602 HC NDL NRV BLK BBMI -B: Performed by: NURSE ANESTHETIST, CERTIFIED REGISTERED

## 2021-03-11 PROCEDURE — 28310 REVISION OF BIG TOE: CPT | Performed by: ORTHOPAEDIC SURGERY

## 2021-03-11 PROCEDURE — 77030031139 HC SUT VCRL2 J&J -A: Performed by: ORTHOPAEDIC SURGERY

## 2021-03-11 PROCEDURE — 77030008825 HC WRE FIX K ZIMM -B: Performed by: ORTHOPAEDIC SURGERY

## 2021-03-11 PROCEDURE — 74011000250 HC RX REV CODE- 250: Performed by: NURSE ANESTHETIST, CERTIFIED REGISTERED

## 2021-03-11 PROCEDURE — 77030000032 HC CUF TRNQT ZIMM -B: Performed by: ORTHOPAEDIC SURGERY

## 2021-03-11 PROCEDURE — 77030003602 HC NDL NRV BLK BBMI -B: Performed by: ANESTHESIOLOGY

## 2021-03-11 DEVICE — IMPLANTABLE DEVICE
Type: IMPLANTABLE DEVICE | Site: TOE | Status: FUNCTIONAL
Brand: FUSEFORCE

## 2021-03-11 DEVICE — WIRE K 2 DMND 1.6MMX22.9CM SS --: Type: IMPLANTABLE DEVICE | Site: TOE | Status: FUNCTIONAL

## 2021-03-11 RX ORDER — HYDROMORPHONE HYDROCHLORIDE 1 MG/ML
0.5 INJECTION, SOLUTION INTRAMUSCULAR; INTRAVENOUS; SUBCUTANEOUS
Status: DISCONTINUED | OUTPATIENT
Start: 2021-03-11 | End: 2021-03-11 | Stop reason: HOSPADM

## 2021-03-11 RX ORDER — MIDAZOLAM HYDROCHLORIDE 1 MG/ML
2 INJECTION, SOLUTION INTRAMUSCULAR; INTRAVENOUS ONCE
Status: COMPLETED | OUTPATIENT
Start: 2021-03-11 | End: 2021-03-11

## 2021-03-11 RX ORDER — SODIUM CHLORIDE 0.9 % (FLUSH) 0.9 %
5-40 SYRINGE (ML) INJECTION EVERY 8 HOURS
Status: DISCONTINUED | OUTPATIENT
Start: 2021-03-11 | End: 2021-03-11 | Stop reason: HOSPADM

## 2021-03-11 RX ORDER — LIDOCAINE HYDROCHLORIDE 20 MG/ML
INJECTION, SOLUTION EPIDURAL; INFILTRATION; INTRACAUDAL; PERINEURAL AS NEEDED
Status: DISCONTINUED | OUTPATIENT
Start: 2021-03-11 | End: 2021-03-11 | Stop reason: HOSPADM

## 2021-03-11 RX ORDER — LIDOCAINE HYDROCHLORIDE 10 MG/ML
0.1 INJECTION INFILTRATION; PERINEURAL AS NEEDED
Status: DISCONTINUED | OUTPATIENT
Start: 2021-03-11 | End: 2021-03-11 | Stop reason: HOSPADM

## 2021-03-11 RX ORDER — NALOXONE HYDROCHLORIDE 0.4 MG/ML
0.2 INJECTION, SOLUTION INTRAMUSCULAR; INTRAVENOUS; SUBCUTANEOUS
Status: DISCONTINUED | OUTPATIENT
Start: 2021-03-11 | End: 2021-03-11 | Stop reason: HOSPADM

## 2021-03-11 RX ORDER — SODIUM CHLORIDE 0.9 % (FLUSH) 0.9 %
5-40 SYRINGE (ML) INJECTION AS NEEDED
Status: DISCONTINUED | OUTPATIENT
Start: 2021-03-11 | End: 2021-03-11 | Stop reason: HOSPADM

## 2021-03-11 RX ORDER — BUPIVACAINE HYDROCHLORIDE 5 MG/ML
INJECTION, SOLUTION EPIDURAL; INTRACAUDAL AS NEEDED
Status: DISCONTINUED | OUTPATIENT
Start: 2021-03-11 | End: 2021-03-11 | Stop reason: HOSPADM

## 2021-03-11 RX ORDER — ROPIVACAINE HYDROCHLORIDE 5 MG/ML
INJECTION, SOLUTION EPIDURAL; INFILTRATION; PERINEURAL
Status: COMPLETED | OUTPATIENT
Start: 2021-03-11 | End: 2021-03-11

## 2021-03-11 RX ORDER — SODIUM CHLORIDE, SODIUM LACTATE, POTASSIUM CHLORIDE, CALCIUM CHLORIDE 600; 310; 30; 20 MG/100ML; MG/100ML; MG/100ML; MG/100ML
75 INJECTION, SOLUTION INTRAVENOUS CONTINUOUS
Status: DISCONTINUED | OUTPATIENT
Start: 2021-03-11 | End: 2021-03-11 | Stop reason: HOSPADM

## 2021-03-11 RX ORDER — PROPOFOL 10 MG/ML
INJECTION, EMULSION INTRAVENOUS AS NEEDED
Status: DISCONTINUED | OUTPATIENT
Start: 2021-03-11 | End: 2021-03-11 | Stop reason: HOSPADM

## 2021-03-11 RX ORDER — NALOXONE HYDROCHLORIDE 0.4 MG/ML
0.2 INJECTION, SOLUTION INTRAMUSCULAR; INTRAVENOUS; SUBCUTANEOUS AS NEEDED
Status: DISCONTINUED | OUTPATIENT
Start: 2021-03-11 | End: 2021-03-11 | Stop reason: HOSPADM

## 2021-03-11 RX ORDER — OXYCODONE HYDROCHLORIDE 5 MG/1
5 TABLET ORAL
Qty: 20 TAB | Refills: 0 | Status: SHIPPED | OUTPATIENT
Start: 2021-03-11 | End: 2021-03-14

## 2021-03-11 RX ORDER — DEXAMETHASONE SODIUM PHOSPHATE 4 MG/ML
INJECTION, SOLUTION INTRA-ARTICULAR; INTRALESIONAL; INTRAMUSCULAR; INTRAVENOUS; SOFT TISSUE AS NEEDED
Status: DISCONTINUED | OUTPATIENT
Start: 2021-03-11 | End: 2021-03-11 | Stop reason: HOSPADM

## 2021-03-11 RX ORDER — FENTANYL CITRATE 50 UG/ML
100 INJECTION, SOLUTION INTRAMUSCULAR; INTRAVENOUS ONCE
Status: COMPLETED | OUTPATIENT
Start: 2021-03-11 | End: 2021-03-11

## 2021-03-11 RX ORDER — CEFAZOLIN SODIUM/WATER 2 G/20 ML
2 SYRINGE (ML) INTRAVENOUS ONCE
Status: COMPLETED | OUTPATIENT
Start: 2021-03-11 | End: 2021-03-11

## 2021-03-11 RX ORDER — ROPIVACAINE HYDROCHLORIDE 2 MG/ML
INJECTION, SOLUTION EPIDURAL; INFILTRATION; PERINEURAL
Status: COMPLETED | OUTPATIENT
Start: 2021-03-11 | End: 2021-03-11

## 2021-03-11 RX ORDER — PROPOFOL 10 MG/ML
INJECTION, EMULSION INTRAVENOUS
Status: DISCONTINUED | OUTPATIENT
Start: 2021-03-11 | End: 2021-03-11 | Stop reason: HOSPADM

## 2021-03-11 RX ORDER — MIDAZOLAM HYDROCHLORIDE 1 MG/ML
2 INJECTION, SOLUTION INTRAMUSCULAR; INTRAVENOUS
Status: DISCONTINUED | OUTPATIENT
Start: 2021-03-11 | End: 2021-03-11 | Stop reason: HOSPADM

## 2021-03-11 RX ORDER — SODIUM CHLORIDE, SODIUM LACTATE, POTASSIUM CHLORIDE, CALCIUM CHLORIDE 600; 310; 30; 20 MG/100ML; MG/100ML; MG/100ML; MG/100ML
25 INJECTION, SOLUTION INTRAVENOUS CONTINUOUS
Status: DISCONTINUED | OUTPATIENT
Start: 2021-03-11 | End: 2021-03-11 | Stop reason: HOSPADM

## 2021-03-11 RX ORDER — ACETAMINOPHEN 500 MG
1000 TABLET ORAL ONCE
Status: COMPLETED | OUTPATIENT
Start: 2021-03-11 | End: 2021-03-11

## 2021-03-11 RX ORDER — OXYCODONE HYDROCHLORIDE 5 MG/1
5 TABLET ORAL
Status: DISCONTINUED | OUTPATIENT
Start: 2021-03-11 | End: 2021-03-11 | Stop reason: HOSPADM

## 2021-03-11 RX ADMIN — CEFAZOLIN 2 G: 1 INJECTION, POWDER, FOR SOLUTION INTRAVENOUS at 08:01

## 2021-03-11 RX ADMIN — ACETAMINOPHEN 1000 MG: 500 TABLET, FILM COATED ORAL at 07:07

## 2021-03-11 RX ADMIN — MEPIVACAINE HYDROCHLORIDE 10 ML: 20 INJECTION, SOLUTION EPIDURAL; INFILTRATION at 07:27

## 2021-03-11 RX ADMIN — MIDAZOLAM 2 MG: 1 INJECTION INTRAMUSCULAR; INTRAVENOUS at 07:24

## 2021-03-11 RX ADMIN — DEXAMETHASONE SODIUM PHOSPHATE 4 MG: 4 INJECTION, SOLUTION INTRAMUSCULAR; INTRAVENOUS at 08:19

## 2021-03-11 RX ADMIN — PROPOFOL 100 MCG/KG/MIN: 10 INJECTION, EMULSION INTRAVENOUS at 07:58

## 2021-03-11 RX ADMIN — FENTANYL CITRATE 50 MCG: 50 INJECTION, SOLUTION INTRAMUSCULAR; INTRAVENOUS at 07:26

## 2021-03-11 RX ADMIN — Medication 5 MG: at 07:28

## 2021-03-11 RX ADMIN — LIDOCAINE HYDROCHLORIDE 50 MG: 20 INJECTION, SOLUTION EPIDURAL; INFILTRATION; INTRACAUDAL; PERINEURAL at 07:59

## 2021-03-11 RX ADMIN — PROPOFOL 20 MG: 10 INJECTION, EMULSION INTRAVENOUS at 07:59

## 2021-03-11 RX ADMIN — SODIUM CHLORIDE, SODIUM LACTATE, POTASSIUM CHLORIDE, AND CALCIUM CHLORIDE 25 ML/HR: 600; 310; 30; 20 INJECTION, SOLUTION INTRAVENOUS at 07:07

## 2021-03-11 RX ADMIN — ROPIVACAINE HYDROCHLORIDE 15 ML: 150 INJECTION, SOLUTION EPIDURAL; INFILTRATION; PERINEURAL at 07:27

## 2021-03-11 RX ADMIN — ROPIVACAINE HYDROCHLORIDE 5 ML: 150 INJECTION, SOLUTION EPIDURAL; INFILTRATION; PERINEURAL at 07:28

## 2021-03-11 NOTE — OP NOTES
FULL OP NOTE    PATIENT NAME: Bunny Sol  MRN: [id number]    DATE OF SURGERY: 3/11/2021    PREOPERATIVE DIAGNOSIS: Hallux rigidus of right foot [M20.21]      POSTOPERATIVE DIAGNOSIS: Hallux rigidus of right foot [M20.21]      PROCEDURE: 1. Right first MTP cheilectomy, [id number]                            2.  Right Erasmo proximal phalangeal closing wedge osteotomy of the great toe, 93668    SURGEON: Xochilt Avendaño MD    HARDWARE:   Implant Name Type Inv. Item Serial No.  Lot No. LRB No. Used Action   STAPLE BNE FIX [id number] NIT - [id number]  STAPLE BNE FIX [id number] NIT  [facility] [employer] [id number] Right 1 Implanted   WIRE K 2 DMND 1.6MMX22.9CM SS --  - [id number]  WIRE K 2 DMND 1.6MMX22.9CM SS --   Lencho Ramires Right 1 Implanted     INDICATIONS: This patient is a 77y.o. year old female with a history of Hallux rigidus of right foot [M20.21] who has failed conservative therapy and desires surgical treatment. Risks and benefits of the procedure including, but not limited to, anesthetic complications as well as surgical complications including damage to nerves and blood vessels, risk of infection, risk of incomplete pain relief, risk of malunion, nonunion and need for additional surgery have been discussed with the patient who wishes to proceed. PROCEDURE IN DETAIL: A time out was done to confirm the operating procedure, surgeon, patient and site. A block was placed by the department of anesthesia. In a preop surgical timeout the right lower extremity was identified as the correct surgical site and prepped draped in a standard sterile fashion ChloraPrep solution. A dorsal approach the first MTP joint was performed followed by capsulotomy. A sagittal saw was used to remove the osteophytes off of the first metatarsal head. A rongeur was then used to remove osteophytes in the base the proximal phalanx as well as any in the gutters.   An Erasmo osteotomy was performed to the proximal phalanx was carried using a Charl Pilon medical compression staple. The wounds were then irrigated and closed using Vicryl in the capsule followed by Monocryl nylon sutures on the skin. Sterile dressings and applied. Anesthesia was discontinued. The patient was transferred back to recovery bed. She was taken to recovery in satisfactory condition. She appeared to tolerate the procedure well. There were no apparent surgical or anesthetic complications. All needle and sponge counts were correct. TOURNIQUET TIME: Approx 20 minutes. SPECIMENS: none    ESTIMATED BLOOD LOSS: min mL.

## 2021-03-11 NOTE — BRIEF OP NOTE
Brief Postoperative Note    Patient: Jose Alberto Fuentes  YOB: 1955  MRN: [id number]    Date of Procedure: 3/11/2021     Pre-Op Diagnosis: Hallux rigidus of right foot [M20.21]    Post-Op Diagnosis: Same as preoperative diagnosis. Procedure(s):  RIGHT 1ST METATARSOPHALANGEAL CHEILECTOMY AND AKIN    Surgeon(s):  Adri Chaudhary MD    Surgical Assistant: None    Anesthesia: MAC     Estimated Blood Loss (mL): Minimal    Complications: None    Specimens: * No specimens in log *     Implants:   Implant Name Type Inv.  Item Serial No.  Lot No. LRB No. Used Action   STAPLE BNE FIX [id number] NIT - [id number]  STAPLE BNE FIX [id number] NIT  [facility] [employer]_ [id number] Right 1 Implanted   WIRE K 2 DMND 1.6MMX22.9CM SS --  - [id number]  WIRE K 2 DMND 1.6MMX22.9CM SS --   Zoltan Albino Right 1 Implanted       Drains: * No LDAs found *    Findings:     Electronically Signed by Yamileth Brown MD on 3/11/2021 at 10:30 AM

## 2021-03-11 NOTE — ANESTHESIA PROCEDURE NOTES
Peripheral Block    Start time: 3/11/2021 7:25 AM  End time: 3/11/2021 7:27 AM  Performed by: Frieda Ugalde MD  Authorized by: Frieda Ugalde MD       Pre-procedure:    Indications: at surgeon's request and post-op pain management    Preanesthetic Checklist: patient identified, risks and benefits discussed, site marked, timeout performed, anesthesia consent given and patient being monitored    Timeout Time: 07:25          Block Type:   Block Type:  Popliteal  Laterality:  Right  Monitoring:  Standard ASA monitoring, responsive to questions, continuous pulse ox, oxygen, frequent vital sign checks and heart rate  Injection Technique:  Single shot  Procedures: ultrasound guided and nerve stimulator    Prep: chlorhexidine    Needle Type:  Stimuplex  Needle Gauge:  22 G  Needle Localization:  Nerve stimulator and ultrasound guidance  Motor Response comment:   Motor Response: minimal motor response >0.4 mA   Medication Injected:  Ropivacaine (PF) (NAROPIN)(0.5%) 5 mg/mL injection, 15 mL  mepivacaine (PF) 2 % (POLOCAINE) injection, 10 mL  Med Admin Time: 3/11/2021 7:27 AM    Assessment:  Number of attempts:  1  Injection Assessment:  Incremental injection every 5 mL, negative aspiration for CSF, ultrasound image on chart, no paresthesia, local visualized surrounding nerve on ultrasound, negative aspiration for blood and no intravascular symptoms  Patient tolerance:  Patient tolerated the procedure well with no immediate complications

## 2021-03-11 NOTE — ANESTHESIA PROCEDURE NOTES
Peripheral Block    Start time: 3/11/2021 7:27 AM  End time: 3/11/2021 7:28 AM  Performed by: Osvaldo Lynn MD  Authorized by: Osvaldo Lynn MD       Pre-procedure: Indications: at surgeon's request and post-op pain management    Preanesthetic Checklist: patient identified, risks and benefits discussed, site marked, timeout performed, anesthesia consent given and patient being monitored    Timeout Time: 07:27          Block Type:   Block Type:   Adductor canal  Laterality:  Right  Monitoring:  Standard ASA monitoring, responsive to questions, continuous pulse ox, oxygen, frequent vital sign checks and heart rate  Injection Technique:  Single shot  Procedures: ultrasound guided    Patient Position: supine  Prep: chlorhexidine    Location:  Mid thigh  Needle Type:  Stimuplex  Needle Gauge:  22 G  Needle Localization:  Ultrasound guidance  Medication Injected:  Ropivacaine (NAROPIN) 2 mg/mL (0.2 %) injection, 5 mg  ropivacaine (PF) (NAROPIN)(0.5%) 5 mg/mL injection, 5 mL  Med Admin Time: 3/11/2021 7:28 AM    Assessment:  Number of attempts:  1  Injection Assessment:  Incremental injection every 5 mL, negative aspiration for CSF, ultrasound image on chart, no paresthesia, local visualized surrounding nerve on ultrasound, negative aspiration for blood and no intravascular symptoms  Patient tolerance:  Patient tolerated the procedure well with no immediate complications

## 2021-03-11 NOTE — ANESTHESIA POSTPROCEDURE EVALUATION
Procedure(s):  RIGHT 1ST METATARSOPHALANGEAL CHEILECTOMY AND AKIN.    total IV anesthesia    Anesthesia Post Evaluation      Multimodal analgesia: multimodal analgesia used between 6 hours prior to anesthesia start to PACU discharge  Patient location during evaluation: bedside  Patient participation: complete - patient participated  Level of consciousness: awake and alert  Pain score: 0  Pain management: adequate  Airway patency: patent  Anesthetic complications: no  Cardiovascular status: acceptable  Respiratory status: acceptable  Hydration status: acceptable  Comments: Pt doing well. Ok to d/c home. Post anesthesia nausea and vomiting:  none  Final Post Anesthesia Temperature Assessment:  Normothermia (36.0-37.5 degrees C)      INITIAL Post-op Vital signs:   Vitals Value Taken Time   /65 03/11/21 0855   Temp 36.4 °C (97.6 °F) 03/11/21 0842   Pulse 71 03/11/21 0856   Resp 17 03/11/21 0842   SpO2 99 % 03/11/21 0856   Vitals shown include unvalidated device data.

## 2021-03-11 NOTE — H&P
Outpatient Surgery History and Physical:  Monica Ward was seen and examined. CHIEF COMPLAINT:   Right foot    PE:     Visit Vitals  BP (!) 157/90 (BP 1 Location: Right upper arm, BP Patient Position: At rest)   Pulse 79   Temp 98.5 °F (36.9 °C)   Resp 18   LMP 07/13/1998   SpO2 99%       Heart:   Regular rhythm      Lungs:  Are clear      Past Medical History:    Patient Active Problem List    Diagnosis    Arthritis of carpometacarpal Mower) joint of right thumb    Arthritis of finger of right hand    Esophageal ring    Osteoarthritis of cervical spine     MRI  showed multilevel cervical spondylosis with moderate left neural foraminal narrowing at C5-6 and C6-7. Saw neurosurgery  and continued conservative medical management recommended.  Allergic rhinitis    Hyperlipidemia    Bilateral carotid artery stenosis    Gastroesophageal reflux disease     4- - EGD - hiatal hernia and schatzki's ring no active esophagitis, gastritis or acid peptic injury - Dr. Warren Gaytan hypertension    Latex allergy    History of breast cancer     ER/SC positive T2 NxER+SC+ double mastectomy 2006      Recurrent infections    Thyroglossal cyst     2011- s/p removal- pathology benign      Osteopenia     Takes reclast   dexa scan 6-12 T-score of -0.7 and a Z-score of 0.3.  Within the right femoral neck the bone mineral density was 0.844 gm/cm2 corresponding to a   T-score of -1.4 and a Z-score of -0.4 hold reclast   8-20-13 max t score -1.2 improved            Surgical History:   Past Surgical History:   Procedure Laterality Date    HX BLADDER SUSPENSION      bladder tack    HX BREAST BIOPSY      x5    HX BREAST RECONSTRUCTION      x2    HX COLONOSCOPY  01/28/2013 1- - colonoscopy - normal - repeat 5 years due to hx polyps and fhx colon cancer - Dr. Ashley Crespo - sent for scanning    HX COLONOSCOPY  2019    wnl repeat 5 yrs    HX ENDOSCOPY  04/12/2007 4- - EGD - hiatal hernia and schatzki's ring no active esophagitis, gastritis or acid peptic injury - Dr. Lili Llanos - sent for scanning    HX ENDOSCOPY  2020    HX HEENT  02/2011    thyroglossal cyst removed    HX HYSTERECTOMY      HX MASTECTOMY Bilateral     x2    HX ORTHOPAEDIC Right 2021    thumb reconstruction,    HX OTHER SURGICAL      thyroid cyst removed    HX TONSILLECTOMY      [place] ABDOMEN SURGERY PROC UNLISTED      breast breast reconstruction with abdominal liposuction       Social History: Patient  reports that she has never smoked. She has never used smokeless tobacco. She reports that she does not drink alcohol or use drugs. Family History:   Family History   Problem Relation Age of Onset   [place] Favorite Stroke Mother    [place] Favorite Breast Cancer Mother     Colon Cancer Father     Hypertension Brother     Heart Disease Brother         due to rheumatic fever-valve surgery    Cancer Maternal Uncle     Cancer Maternal Grandfather     Hypertension Paternal Grandmother     Stroke Paternal Grandmother     Heart Disease Paternal Grandfather     Hypertension Brother     Malignant Hyperthermia Neg Hx     Pseudocholinesterase Deficiency Neg Hx     Delayed Awakening Neg Hx     Post-op Nausea/Vomiting Neg Hx     Emergence Delirium Neg Hx     Post-op Cognitive Dysfunction Neg Hx     Other Neg Hx     Coronary Artery Disease Neg Hx        Allergies: Reviewed per EMR  Allergies   Allergen Reactions    Latex Anaphylaxis and Rash     The anaphylactic reaction was with latex paint    Aleve [Naproxen Sodium] Other (comments)     3/10/21 patient states that she can take with no problem    Zestril [Lisinopril] Anaphylaxis    Zofran [Ondansetron Hcl] Rash     Intense vasodilation with redness, hypotension, responded to epinephrine    Amlodipine Swelling     ankles swell       Medications:    No current facility-administered medications on file prior to encounter.       Current Outpatient Medications on File Prior to Encounter Medication Sig    folic acid 864 mcg tablet Take 400 mcg by mouth daily.  magnesium 250 mg tab Take 1 daily    aspirin delayed-release 81 mg tablet Take 81 mg by mouth daily. preventative    phytonadione, vitamin K1, (MEPHYTON) 5 mg tablet Take  by mouth now.  garlic 1 mg cap Take  by mouth nightly.  ascorbic acid, vitamin C, (VITAMIN C) 500 mg tablet Take  by mouth.  ECHINACEA PO Take 750 mg by mouth nightly.  CALCIUM CITRATE/VITAMIN D3 (CALCIUM CITRATE + D PO) Take 1 Cap by mouth two (2) times a day. 630 mg and 500 iu     vitamin c-vitamin e (CRANBERRY CONCENTRATE) cap Take 168 mg by mouth nightly.  MULTIVITAMIN/IRON/FOLIC ACID (CEROVITE ADVANCED FORMULA PO) Take 2 Tabs by mouth daily.  CYANOCOBALAMIN (VITAMIN B-12 PO) Take 2,500 mcg by mouth daily.  GINKGO BILOBA (GINKOBA PO) Take 120 mg by mouth two (2) times a day.  OTHER,NON-FORMULARY, Take  by mouth nightly. Alpha lipoic acid 200mg daily        The surgery is planned for the right foot     History and physical has been reviewed. The patient has been examined. There have been no significant clinical changes since the completion of the originally dated History and Physical.      The patient is here today for outpatient surgery. I have examined the patient, no changes are noted in the patient's medical status. Necessity for the procedure/care is still present and the history and physical above is current. See the office notes for the full long term history of the problem. Please see the recent office notes for the musculoskeletal examination.     Signed By: Xochilt Avendaño MD     March 11, 2021 7:13 AM

## 2021-03-11 NOTE — ANESTHESIA PREPROCEDURE EVALUATION
Relevant Problems   No relevant active problems       Anesthetic History   No history of anesthetic complications            Review of Systems / Medical History  Patient summary reviewed and pertinent labs reviewed    Pulmonary  Within defined limits                 Neuro/Psych   Within defined limits           Cardiovascular    Hypertension: well controlled              Exercise tolerance: >4 METS  Comments: Denies CP, SOB or changes in functional status   GI/Hepatic/Renal     GERD: well controlled           Endo/Other        Arthritis and cancer (Hx/o breast CA)     Other Findings              Physical Exam    Airway  Mallampati: II  TM Distance: 4 - 6 cm  Neck ROM: normal range of motion   Mouth opening: Normal     Cardiovascular    Rhythm: regular  Rate: normal         Dental    Dentition: Caps/crowns     Pulmonary  Breath sounds clear to auscultation               Abdominal  GI exam deferred       Other Findings            Anesthetic Plan    ASA: 2  Anesthesia type: total IV anesthesia      Post-op pain plan if not by surgeon: peripheral nerve block single    Induction: Intravenous  Anesthetic plan and risks discussed with: Patient and Spouse

## 2021-03-11 NOTE — DISCHARGE INSTRUCTIONS
INSTRUCTIONS FOLLOWING FOOT SURGERY    ACTIVITY  Elevate foot. No Ice  Protected partial weight bearing on the heel only as tolerated in post op shoe after full sensation returns. Let the office know if dressing gets saturated with water . No weight bearing until you have sensory and motor functioned returned to normal in operative leg. Use crutches or knee walker until you can walk independently in post op shoe  Don't put anything into the splint to relieve itching etc. Take one 325mg aspirin daily if okay with your medical doctor and you have no GI ulcer. Get up and out of bed frequently. While in bed move the legs as much as possible)      DRESSING CARE Keep dry and in place until follow up appointment    CALL YOUR DOCTOR IF YOU HAVE  Excessive bleeding that does not stop after holding mild pressure over the area. Temperature of 101 degrees or above. Redness, excessive swelling or bruising, and/or green or yellow, smelly discharge from incision. Loss of sensation - cold, white or blue toes. DIET  Day of Surgery: Clear liquids until no nausea or vomiting; then light, bland diet (Baked chicken, plain rice, grits, scrambled egg, toast). Nothing Greasy, fried or spicy today  Advance to regular diet on second day, unless your doctor orders otherwise. PAIN  Take pain medications as directed by your doctor. Call your doctor if pain is NOT relieved by medication. PAIN MED SIDE EFFECTS  Constipation: Lots of fluids, try prune juice, then OTC stool softeners if necessary  Nausea: Take medication with food. AFTER ANESTHESIA  For the first 24 hours and while taking narcotics for pain: DO NOT Drive, Drink Alcoholic beverages, or make important Decisions. Be aware of dizziness following anesthesia and while taking pain medication. Learning About a Peripheral Nerve Block  What is a peripheral nerve block?      For some surgeries, a doctor may do a peripheral nerve block to numb the part of the body involved, often an arm or a leg. Peripheral nerves lead from the spinal cord to other parts of the body, carrying signals for movement and feeling. The nerve block is sometimes used with medicine that makes you sleep during the surgery. Or the nerve block may be all that's needed, and you can stay awake without feeling any pain. The nerve block may also help keep your pain level lower after the surgery. How is a peripheral nerve block done? Before the nerve block, you will have a tube called an IV placed in your arm. This will be used to give you medicine to make you sleep or keep you comfortable. The doctor may use ultrasound, X-ray, or another imaging method to help guide the needle that will be used for the nerve block. After finding the right spot, the doctor uses a tiny needle to numb the skin. Then he or she puts the nerve block needle into the numbed area. If you are awake, you may feel some pressure. But you should not feel pain. What can you expect after a peripheral nerve block? The nerve block will leave the area that was numbed, like your arm or leg, partly or totally numb for a while. Your doctor will tell you for how long. Follow your doctor's instructions closely. If you'll be going home right after the surgery, you'll need someone to drive you. As the block wears off, you'll start to feel some pain from the surgery. Be sure to take your pain medicines before the pain gets bad. What are the risks of a peripheral nerve block? You will be closely watched during the nerve block. That's because the anesthetics used for the nerve block may affect the central nervous system, cardiovascular system, and respiratory system (airway and lungs). They may also affect blood pressure, breathing, heartbeat, and other vital functions. As the needle is put in place during the nerve block, the nerve to be blocked may be touched with the tip of the needle.  If this happens, you may feel a sharp sensation like an electrical shock in the part of the body supplied by the nerve. Be sure to let your doctor know if you feel this. Problems after a nerve block are rare. There is a small risk of seizures, heart problems, damage to nerves, infection, or bleeding. The benefits usually outweigh these risks. Follow-up care is a key part of your treatment and safety. Be sure to make and go to all appointments, and call your doctor if you are having problems. It's also a good idea to know your test results and keep a list of the medicines you take. Where can you learn more? Go to http://www.gray.com/  Enter A594 in the search box to learn more about \"Learning About a Peripheral Nerve Block. \"  Current as of: November 20, 2019               Content Version: 12.6  © 7591-0219 [employer]. Care instructions adapted under license by [employer] (which disclaims liability or warranty for this information). If you have questions about a medical condition or this instruction, always ask your healthcare professional. [address] any warranty or liability for your use of this information. Preventing Infection at Home  We care about preventing infection and avoiding the spread of germs - not only when you are in the hospital but also when you return home. When you return home from the hospital, its important to take the following steps to help prevent infection and avoid spreading germs that could infect you and others. Ask everyone in your home to follow these guidelines, too. Clean Your Hands  · Clean your hands whenever your hands are visibly dirty, before you eat, before or after touching your mouth, nose or eyes, and before preparing food. Clean them after contact with body fluids, using the restroom, touching animals or changing diapers. · When washing hands, wet them with warm water and work up a lather.  Rub hands for at least 15 seconds, then rinse them and pat them dry with a clean towel or paper towel. · When using hand sanitizers, it should take about 15 seconds to rub your hands dry. If not, you probably didnt apply enough . Cover Your Sneeze or Cough  Germs are released into the air whenever you sneeze or cough. To prevent the spread of infection:  · Turn away from other people before coughing or sneezing. · Cover your mouth or nose with a tissue when you cough or sneeze. Put the tissue in the trash. · If you dont have a tissue, cough or sneeze into your upper sleeve, not your hands. · Always clean your hands after coughing or sneezing. Care for Wounds  Your skin is your bodys first line of defense against germs, but an open wound leaves an easy way for germs to enter your body. To prevent infection:  · Clean your hands before and after changing wound dressings, and wear gloves to change dressings if recommended by your doctor. · Take special care with IV lines or other devices inserted into the body. If you must touch them, clean your hands first.  · Follow any specific instructions from your doctor to care for your wounds. Contact your doctor if you experience any signs of infection, such as fever or increased redness at the surgical or wound site. Keep a Clean Home  · Clean or wipe commonly touched hard surfaces like door handles, sinks, tabletops, phones and TV remotes. · Use products labeled disinfectant to kill harmful bacteria and viruses. · Use a clean cloth or paper towel to clean and dry surfaces. Wiping surfaces with a dirty dishcloth, sponge or towel will only spread germs. · Never share toothbrushes, farris, drinking glasses, utensils, razor blades, face cloths or bath towels to avoid spreading germs. · Be sure that the linens that you sleep on are clean. · Keep pets away from wounds and wash your hands after touching pets, their toys or bedding. We care about you and your health.  Remember, preventing infections is a team effort between you, your family, friends and health care providers. DISCHARGE SUMMARY from Nurse    PATIENT INSTRUCTIONS:    After general anesthesia or intravenous sedation, for 24 hours or while taking prescription Narcotics:  · Limit your activities  · Do not drive and operate hazardous machinery  · Do not make important personal or business decisions  · Do  not drink alcoholic beverages  · If you have not urinated within 8 hours after discharge, please contact your surgeon on call. *  Please give a list of your current medications to your Primary Care Provider. *  Please update this list whenever your medications are discontinued, doses are      changed, or new medications (including over-the-counter products) are added. *  Please carry medication information at all times in case of emergency situations. These are general instructions for a healthy lifestyle:    No smoking/ No tobacco products/ Avoid exposure to second hand smoke    Surgeon General's Warning:  Quitting smoking now greatly reduces serious risk to your health. Obesity, smoking, and sedentary lifestyle greatly increases your risk for illness    A healthy diet, regular physical exercise & weight monitoring are important for maintaining a healthy lifestyle    You may be retaining fluid if you have a history of heart failure or if you experience any of the following symptoms:  Weight gain of 3 pounds or more overnight or 5 pounds in a week, increased swelling in our hands or feet or shortness of breath while lying flat in bed. Please call your doctor as soon as you notice any of these symptoms; do not wait until your next office visit.     Recognize signs and symptoms of STROKE:    F-face looks uneven    A-arms unable to move or move unevenly    S-speech slurred or non-existent    T-time-call 911 as soon as signs and symptoms begin-DO NOT go       Back to bed or wait to see if you get better-TIME IS BRAIN. Learning About How to Use Crutches  Your Care Instructions  Crutches can help you walk when you have an injured hip, leg, knee, ankle, or foot. Your doctor will tell you how much weight--if any--you can put on your leg. Be sure your crutches fit you. When you stand up in your normal posture, there should be space for two or three fingers between the top of the crutch and your armpit. When you let your hands hang down, the hand  should be at your wrists. When you put your hands on the hand , your elbows should be slightly bent. To stay safe when using crutches:  · Look straight ahead, not down at your feet. · Clear away small rugs, cords, or anything else that could cause you to trip, slip, or fall. · Be very careful around pets and small children. They can get in your path when you least expect it. · Be sure the rubber tips on your crutches are clean and in good condition to help prevent slipping. · Avoid slick conditions, such as wet floors and snowy or icy driveways. In bad weather, be especially careful on curbs and steps. How to use crutches  Getting ready to walk    1. Bend your elbows slightly. Press the padded top parts of the crutches against your sides, under your armpits. 2. If you have been told not to put any weight on your injured leg, keep that leg bent and off the ground. Walking with crutches    1. Put both crutches about 12 inches in front of you. 2. Put your weight on the handgrips, not on the pads under your arms. (Constant pressure against your underarms can cause numbness.) Swing your body forward. (If you have been told not to put any weight on your injured leg, keep that leg bent and off the ground.)  3. To complete the step, put your weight on the strong leg. 4. Move your crutches about 12 inches in front of you, and start the next step. 5. When you're confident using the crutches, you can move the crutches and your injured leg at the same time.  Then push straight down on the crutches as you step past the crutches with your strong leg, as you would in normal walking. 6. Take small steps. 7. Use ramps and elevators when you can. Sitting down    1. To sit, back up to the chair. Use one hand to hold both crutches by the handgrips, beside your injured leg. With the other hand, hold onto the seat and slowly lower yourself onto the chair. 2. Lay the crutches on the ground near your chair. If you prop them up, they may fall over. Getting up from a chair    1. To get up from a chair,  the crutches and put them in one hand beside your injured leg. 2. Put your weight on the handgrips of the crutches and on your strong leg to stand up. Walking up stairs    1. To go up stairs, step up with your strong leg and then bring the crutches and your injured leg to the upper step. 2. For stairs that have handrails: Put both crutches under the arm opposite the handrail. Use the hand opposite the handrail to hold both crutches by the handgrips. 3. Hold onto the handrail as you go up. Put your strong leg on the step first when you go up. Walking down stairs    1. To go down stairs, put your crutches and injured leg on the lower step. 2. Bring your strong leg to the lower step. This saying may help you remember: \"Up with the good, down with the bad. \"  3. For stairs that have handrails: Put both crutches under the arm opposite the handrail. Use the hand opposite the handrail to hold both crutches by the handgrips. Hold onto the handrail as you go down. Follow the same process you use for stairs: Lead with your crutches and injured leg on the way down. Follow-up care is a key part of your treatment and safety. Be sure to make and go to all appointments, and call your doctor if you are having problems. It's also a good idea to know your test results and keep a list of the medicines you take. Where can you learn more?   Go to http://www.gray.com/. Enter O938 in the search box to learn more about \"Learning About How to Use Crutches. \"  Current as of: August 4, 2016  Content Version: 11.2  © 4517-6697 [employer]. Care instructions adapted under license by [employer] (which disclaims liability or warranty for this information). If you have questions about a medical condition or this instruction, always ask your healthcare professional. [address] any warranty or liability for your use of this information. Learning About Coronavirus [phone])  Coronavirus [phone]): Overview  What is coronavirus (COVID-19)? The coronavirus disease (COVID-19) is caused by a virus. It is an illness that was first found in [place], [place], in December 2019. It has since spread worldwide. The virus can cause fever, cough, and trouble breathing. In severe cases, it can cause pneumonia and make it hard to breathe without help. It can cause death. Coronaviruses are a large group of viruses. They cause the common cold. They also cause more serious illnesses like Middle East respiratory syndrome (MERS) and severe acute respiratory syndrome (SARS). COVID-19 is caused by a novel coronavirus. That means it's a new type that has not been seen in people before. This virus spreads person-to-person through droplets from coughing and sneezing. It can also spread when you are close to someone who is infected. And it can spread when you touch something that has the virus on it, such as a doorknob or a tabletop. What can you do to protect yourself from coronavirus (COVID-19)? The best way to protect yourself from getting sick is to:  · Avoid areas where there is an outbreak. · Avoid contact with people who may be infected. · Wash your hands often with soap or alcohol-based hand sanitizers. · Avoid crowds and try to stay at least 6 feet away from other people.   · Wash your hands often, especially after you cough or sneeze. Use soap and water, and scrub for at least 20 seconds. If soap and water aren't available, use an alcohol-based hand . · Avoid touching your mouth, nose, and eyes. What can you do to avoid spreading the virus to others? To help avoid spreading the virus to others:  · Cover your mouth with a tissue when you cough or sneeze. Then throw the tissue in the trash. · Use a disinfectant to clean things that you touch often. · Wear a cloth face cover if you have to go to public areas. · Stay home if you are sick or have been exposed to the virus. Don't go to school, work, or public areas. And don't use public transportation, ride-shares, or taxis unless you have no choice. · If you are sick:  ? Leave your home only if you need to get medical care. But call the doctor's office first so they know you're coming. And wear a face cover. ? Wear the face cover whenever you're around other people. It can help stop the spread of the virus when you cough or sneeze. ? Clean and disinfect your home every day. Use household  and disinfectant wipes or sprays. Take special care to clean things that you grab with your hands. These include doorknobs, remote controls, phones, and handles on your refrigerator and microwave. And don't forget countertops, tabletops, bathrooms, and computer keyboards. When to call for help  [id number] anytime you think you may need emergency care. For example, call if:  · You have severe trouble breathing. (You can't talk at all.)  · You have constant chest pain or pressure. · You are severely dizzy or lightheaded. · You are confused or can't think clearly. · Your face and lips have a blue color. · You pass out (lose consciousness) or are very hard to wake up. Call your doctor now if you develop symptoms such as:  · Shortness of breath. · Fever. · Cough. If you need to get care, call ahead to the doctor's office for instructions before you go. Make sure you wear a face cover to prevent exposing other people to the virus. Where can you get the latest information? The following health organizations are tracking and studying this virus. Their websites contain the most up-to-date information. Osman Alarcon also learn what to do if you think you may have been exposed to the virus. · U.S. Centers for Disease Control and Prevention (CDC): The CDC provides updated news about the disease and travel advice. The website also tells you how to prevent the spread of infection. www.cdc.gov  · World Health Organization [facility]): WHO offers information about the virus outbreaks. WHO also has travel advice. www.who.int  Current as of: May 8, 2020               Content Version: 12.5  © 2006-2020 Healthwise[employer]. Care instructions adapted under license by [employer] (which disclaims liability or warranty for this information). If you have questions about a medical condition or this instruction, always ask your healthcare professional. [address] any warranty or liability for your use of this information.

## 2021-03-12 NOTE — ADDENDUM NOTE
Addendum  created 03/12/21 0725 by Lisette Corley CRNA    Flowsheet accepted, Intraprocedure Flowsheets edited

## 2021-09-02 ENCOUNTER — HOSPITAL ENCOUNTER (OUTPATIENT)
Dept: CT IMAGING | Age: 66
Discharge: HOME | End: 2021-09-02
Attending: INTERNAL MEDICINE
Payer: MEDICARE

## 2021-09-02 DIAGNOSIS — R91.1 PULMONARY NODULE: ICD-10-CM

## 2021-09-02 DIAGNOSIS — R06.02 SOB (SHORTNESS OF BREATH): ICD-10-CM

## 2021-09-02 PROCEDURE — 71250 CT THORAX DX C-: CPT

## 2021-09-15 ENCOUNTER — HOSPITAL ENCOUNTER (OUTPATIENT)
Dept: GENERAL RADIOLOGY | Age: 66
Discharge: HOME | End: 2021-09-15
Payer: MEDICARE

## 2021-09-15 DIAGNOSIS — M25.511 ACUTE PAIN OF RIGHT SHOULDER: ICD-10-CM

## 2021-09-15 PROCEDURE — 73030 X-RAY EXAM OF SHOULDER: CPT

## 2022-03-01 ENCOUNTER — HOSPITAL ENCOUNTER (OUTPATIENT)
Dept: CT IMAGING | Age: 67
Discharge: HOME | End: 2022-03-01
Attending: INTERNAL MEDICINE
Payer: MEDICARE

## 2022-03-01 DIAGNOSIS — R06.02 SOB (SHORTNESS OF BREATH): ICD-10-CM

## 2022-03-01 DIAGNOSIS — R91.1 PULMONARY NODULE: ICD-10-CM

## 2022-03-01 PROCEDURE — 71250 CT THORAX DX C-: CPT

## 2022-03-18 PROBLEM — M47.812 OSTEOARTHRITIS OF CERVICAL SPINE: Status: ACTIVE | Noted: 2019-11-28

## 2022-03-18 PROBLEM — E78.5 HYPERLIPIDEMIA: Status: ACTIVE | Noted: 2018-04-19

## 2022-03-18 PROBLEM — K22.2 ESOPHAGEAL RING: Status: ACTIVE | Noted: 2020-11-04

## 2022-03-18 PROBLEM — I10 ESSENTIAL HYPERTENSION: Status: ACTIVE | Noted: 2018-02-01

## 2022-03-18 PROBLEM — M18.11 ARTHRITIS OF CARPOMETACARPAL (CMC) JOINT OF RIGHT THUMB: Status: ACTIVE | Noted: 2021-03-10

## 2022-03-18 PROBLEM — I65.23 BILATERAL CAROTID ARTERY STENOSIS: Status: ACTIVE | Noted: 2018-04-19

## 2022-03-19 PROBLEM — J30.9 ALLERGIC RHINITIS: Status: ACTIVE | Noted: 2018-12-12

## 2022-03-19 PROBLEM — K21.9 GASTROESOPHAGEAL REFLUX DISEASE: Status: ACTIVE | Noted: 2018-02-01

## 2022-03-19 PROBLEM — M19.041 ARTHRITIS OF FINGER OF RIGHT HAND: Status: ACTIVE | Noted: 2021-03-10

## 2022-03-19 PROBLEM — Z91.040 LATEX ALLERGY: Status: ACTIVE | Noted: 2018-02-01

## 2022-03-20 PROBLEM — Z85.3 HISTORY OF BREAST CANCER: Status: ACTIVE | Noted: 2018-02-01

## 2022-04-27 ENCOUNTER — NURSE TRIAGE (OUTPATIENT)
Dept: OTHER | Facility: CLINIC | Age: 67
End: 2022-04-27

## 2022-04-27 NOTE — TELEPHONE ENCOUNTER
Received call from Umm Salas at [facility] with Red Flag Complaint. Subjective: Caller states \"pain in kidney area\"     Current Symptoms: Pain in right side kidney area yesterday morning and this morning after walking [place]. Not constant, feels like a \"pinch\" at times. Denies urinary symptoms. Hx osteopenia d/t cancer tx and bladder infection    Recently started taking beet root (2400mg) supplement for control of BP    Gave platelets and RBC a few weeks ago, was told her Hgb was high at that time. Under significant stress at this time, not unusual amount per pt report. Onset: 1 day ago; intermittent, seems to last longer today on and off    Associated Symptoms: eating, drinking, sleeping normally    Pain Severity: 0/10; Happens sporadically, pt considers it a mild -shooting type pain    Temperature: Denies    What has been tried: Denies    LMP: NA Pregnant: NA    Recommended disposition: Rin Addison 7825 advice provided, patient verbalizes understanding; denies any other questions or concerns; instructed to call back for any new or worsening symptoms. Patient offered home care advice and feels she does not need to schedule an appt at this time. She will call back with any new/worsening/persistent symptoms. Attention Provider: Thank you for allowing me to participate in the care of your patient. The patient was connected to triage in response to information provided to the [facility]. Please do not respond through this encounter as the response is not directed to a shared pool. Reason for Disposition   Mild flank pain and present < 3 days    Protocols used:  FLANK PAIN-ADULT-[place]

## 2022-05-26 ENCOUNTER — OFFICE VISIT (OUTPATIENT)
Dept: FAMILY MEDICINE CLINIC | Facility: CLINIC | Age: 67
End: 2022-05-26
Payer: MEDICARE

## 2022-05-26 VITALS
WEIGHT: 138 LBS | HEIGHT: 64 IN | DIASTOLIC BLOOD PRESSURE: 82 MMHG | SYSTOLIC BLOOD PRESSURE: 130 MMHG | OXYGEN SATURATION: 99 % | BODY MASS INDEX: 23.56 KG/M2 | HEART RATE: 71 BPM

## 2022-05-26 DIAGNOSIS — R91.1 PULMONARY NODULE: ICD-10-CM

## 2022-05-26 DIAGNOSIS — N95.0 POSTMENOPAUSAL BLEEDING: ICD-10-CM

## 2022-05-26 DIAGNOSIS — C50.919 MALIGNANT NEOPLASM OF FEMALE BREAST, UNSPECIFIED ESTROGEN RECEPTOR STATUS, UNSPECIFIED LATERALITY, UNSPECIFIED SITE OF BREAST (HCC): Primary | ICD-10-CM

## 2022-05-26 DIAGNOSIS — I10 ESSENTIAL HYPERTENSION: ICD-10-CM

## 2022-05-26 DIAGNOSIS — N95.2 ATROPHIC VAGINITIS: ICD-10-CM

## 2022-05-26 LAB
BASOPHILS # BLD: 0 K/UL (ref 0–0.2)
BASOPHILS NFR BLD: 1 % (ref 0–2)
DIFFERENTIAL METHOD BLD: ABNORMAL
EOSINOPHIL # BLD: 0 K/UL (ref 0–0.8)
EOSINOPHIL NFR BLD: 1 % (ref 0.5–7.8)
ERYTHROCYTE [DISTWIDTH] IN BLOOD BY AUTOMATED COUNT: 12.9 % (ref 11.9–14.6)
HCT VFR BLD AUTO: 40.9 % (ref 35.8–46.3)
HGB BLD-MCNC: 13.2 G/DL (ref 11.7–15.4)
IMM GRANULOCYTES # BLD AUTO: 0 K/UL (ref 0–0.5)
IMM GRANULOCYTES NFR BLD AUTO: 0 % (ref 0–5)
LYMPHOCYTES # BLD: 1.3 K/UL (ref 0.5–4.6)
LYMPHOCYTES NFR BLD: 31 % (ref 13–44)
MCH RBC QN AUTO: 30.6 PG (ref 26.1–32.9)
MCHC RBC AUTO-ENTMCNC: 32.3 G/DL (ref 31.4–35)
MCV RBC AUTO: 94.7 FL (ref 79.6–97.8)
MONOCYTES # BLD: 0.3 K/UL (ref 0.1–1.3)
MONOCYTES NFR BLD: 7 % (ref 4–12)
NEUTS SEG # BLD: 2.5 K/UL (ref 1.7–8.2)
NEUTS SEG NFR BLD: 60 % (ref 43–78)
NRBC # BLD: 0 K/UL (ref 0–0.2)
PLATELET # BLD AUTO: 199 K/UL (ref 150–450)
PMV BLD AUTO: 11 FL (ref 9.4–12.3)
RBC # BLD AUTO: 4.32 M/UL (ref 4.05–5.2)
WBC # BLD AUTO: 4.2 K/UL (ref 4.3–11.1)

## 2022-05-26 PROCEDURE — 1123F ACP DISCUSS/DSCN MKR DOCD: CPT | Performed by: FAMILY MEDICINE

## 2022-05-26 PROCEDURE — 1036F TOBACCO NON-USER: CPT | Performed by: FAMILY MEDICINE

## 2022-05-26 PROCEDURE — G8420 CALC BMI NORM PARAMETERS: HCPCS | Performed by: FAMILY MEDICINE

## 2022-05-26 PROCEDURE — 1090F PRES/ABSN URINE INCON ASSESS: CPT | Performed by: FAMILY MEDICINE

## 2022-05-26 PROCEDURE — 3017F COLORECTAL CA SCREEN DOC REV: CPT | Performed by: FAMILY MEDICINE

## 2022-05-26 PROCEDURE — G8427 DOCREV CUR MEDS BY ELIG CLIN: HCPCS | Performed by: FAMILY MEDICINE

## 2022-05-26 PROCEDURE — 99214 OFFICE O/P EST MOD 30 MIN: CPT | Performed by: FAMILY MEDICINE

## 2022-05-26 PROCEDURE — G8399 PT W/DXA RESULTS DOCUMENT: HCPCS | Performed by: FAMILY MEDICINE

## 2022-05-26 RX ORDER — METOPROLOL SUCCINATE 25 MG/1
25 TABLET, EXTENDED RELEASE ORAL DAILY
Qty: 90 TABLET | Refills: 2 | Status: SHIPPED | OUTPATIENT
Start: 2022-05-26 | End: 2022-10-17 | Stop reason: SDUPTHER

## 2022-05-26 ASSESSMENT — PATIENT HEALTH QUESTIONNAIRE - PHQ9
SUM OF ALL RESPONSES TO PHQ QUESTIONS 1-9: 0
1. LITTLE INTEREST OR PLEASURE IN DOING THINGS: 0
2. FEELING DOWN, DEPRESSED OR HOPELESS: 0
SUM OF ALL RESPONSES TO PHQ QUESTIONS 1-9: 0
SUM OF ALL RESPONSES TO PHQ QUESTIONS 1-9: 0
SUM OF ALL RESPONSES TO PHQ9 QUESTIONS 1 & 2: 0
SUM OF ALL RESPONSES TO PHQ QUESTIONS 1-9: 0

## 2022-05-26 NOTE — PATIENT INSTRUCTIONS
Patient Education        Atrophic Vaginitis: Care Instructions  Overview     Atrophic vaginitis is an irritation of the vagina. It's caused by thinning tissues and less moisture in the vaginal walls. It often happens during menopause when hormone levels change. Surgery to remove the ovaries also cancause it. Your doctor may do tests to rule out other causes. The problem is most often treated with the hormone estrogen. It comes in acream, tablets, or a soft plastic ring that is placed in the vagina. Follow-up care is a key part of your treatment and safety. Be sure to make and go to all appointments, and call your doctor if you are having problems. It's also a good idea to know your test results and keep alist of the medicines you take. How can you care for yourself at home?  Use a lubricant for your vagina if sex is dry or painful. Lubricants can be water-,silicone-, or oil-based. Ask your doctor about what kind may be a better option for you.  Talk with your doctor about using vaginal estrogen. It treats dryness and thinning tissue.  Do not douche.  Ask your doctor about pelvic floor physical therapy. Regular vaginal stretching exercises may help reduce symptoms. Regular sexual activity may also help. When should you call for help? Watch closely for changes in your health, and be sure to contact your doctor if:     You have unexpected vaginal bleeding.      You do not get better as expected. Where can you learn more? Go to https://chnicolleeb.healthTheorem. org and sign in to your [employer] account. Enter R330 in the [facility] box to learn more about \"Atrophic Vaginitis: Care Instructions. \"     If you do not have an account, please click on the \"Sign Up Now\" link. Current as of: November 22, 2021               Content Version: 13.2  © 2775-8884 [employer]wise, [employer]. Care instructions adapted under license by [facility] ([facility]).  If you have questions about a medical condition or this instruction, always ask your healthcare professional. [place] [address] any warranty or liability for your use of this information.

## 2022-05-26 NOTE — PROGRESS NOTES
Chris Ndiaye is a 74 y.o. female who presents today for the following:  Chief Complaint   Patient presents with    Vaginal Bleeding       Allergies   Allergen Reactions    Latex Anaphylaxis and Rash     The anaphylactic reaction was with latex paint    Lisinopril Anaphylaxis    Naproxen Sodium Anaphylaxis     Face swelled; throat closed up    Ondansetron Hcl Rash     Intense vasodilation with redness, hypotension, responded to epinephrine    Amlodipine Swelling     ankles swell       Current Outpatient Medications   Medication Sig Dispense Refill    ascorbic acid (VITAMIN C) 500 MG tablet Take by mouth      aspirin 81 MG EC tablet Take 81 mg by mouth daily      atorvastatin (LIPITOR) 10 MG tablet Take 10 mg by mouth      azithromycin (ZITHROMAX) 500 MG tablet Take 4 tabs PO once as needed for travelers diarrhea      cefpodoxime (VANTIN) 200 MG tablet Take one tab PO BID for 7 days as needed for urinary tract infection or sinus infection      diclofenac sodium (VOLTAREN) 1 % GEL Apply 2 g topically 4 times daily as needed      folic acid (FOLVITE) 432 MCG tablet Take 400 mcg by mouth daily      metoprolol succinate (TOPROL XL) 50 MG extended release tablet Take 50 mg by mouth daily      omeprazole (PRILOSEC) 20 MG delayed release capsule Take 20 mg by mouth daily      phytonadione (VITAMIN K) 5 MG tablet Take by mouth       No current facility-administered medications for this visit.        Past Medical History:   Diagnosis Date    Adverse effect of anesthesia     BP drops during surgery, no problem 2021    Allergic rhinitis 12/12/2018    Arthritis     DDD    Bilateral carotid artery stenosis 4/19/2018    Cancer ([facility] [address]     breast, left    Chronic pain     pt denies presently- lumbar sometimes    GERD (gastroesophageal reflux disease)     well controlled    History of breast cancer 2/1/2018    ER/AL positive T2 NxER+AL+ double mastectomy 2006    HTN (hypertension)     120s/80s- controlled with med    Hyperlipidemia 4/19/2018    Latex allergy 2/1/2018    Osteoarthritis of cervical spine 11/28/2019    Thromboembolus ([address]     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 Zaheer Perry - sent for scanning    HEENT  02/2011    thyroglossal cyst removed    HYSTERECTOMY      MASTECTOMY Bilateral     x2    ORTHOPEDIC SURGERY Right 2021    thumb reconstruction,    OTHER SURGICAL HISTORY      thyroid cyst removed    MT ABDOMEN SURGERY PROC UNLISTED      breast breast reconstruction with abdominal liposuction    TONSILLECTOMY      UPPER GASTROINTESTINAL ENDOSCOPY  04/12/2007 4- - EGD - hiatal hernia and schatzki's ring no active esophagitis, gastritis or acid peptic injury - Dr. Rhonda Robles - sent for scanning    UPPER GASTROINTESTINAL ENDOSCOPY  2020       Social History     Tobacco Use    Smoking status: Never Smoker    Smokeless tobacco: Never Used   Substance Use Topics    Alcohol use: No        Family History   Problem Relation Age of Onset    Stroke Mother     Breast Cancer Mother     Coronary Art Dis Neg Hx     Other Neg Hx     Post-op Cognitive Dysfunction Neg Hx     Emergence Delirium Neg Hx     Post-op Nausea/Vomiting Neg Hx     Delayed Awakening Neg Hx     Pseudochol.  Deficiency Neg Hx     Malig Hypertherm Neg Hx     Hypertension Brother     Heart Disease Paternal Grandfather     Stroke Paternal Grandmother     Hypertension Paternal Grandmother     Cancer Maternal Grandfather     Cancer Maternal Uncle     Heart Disease Brother         due to rheumatic fever-valve surgery    Hypertension Brother     Colon Cancer Father She has a history of breast cancer in 2005 treated with bilateral mastectomy, chemotherapy, radiation and endocrine therapy. Also has pulmonary nodule followed with annual CT of chest, osteoarthritis, hypertension, osteopenia, and bilateral carotid artery stenosis with last dopplers in 2019 showed no significant stenosis. Patient is here today for acute visit. Patient reports several days of presumed vaginal spotting. Reports noted blood when she wiped. She had hysterectomy for large uterine fibroid. Reports no pap smears since that time. Denies recent intercourse of vaginal trauma. Denies seeing blood in urine or rectal bleeding. Review of Systems   Cardiovascular: Negative for chest pain. Genitourinary: Positive for vaginal bleeding. Negative for decreased urine volume, dyspareunia, dysuria, frequency, genital sores, hematuria, pelvic pain, urgency, vaginal discharge and vaginal pain. Hematological: Does not bruise/bleed easily. /82   Pulse 71   Ht 5' 4\" (1.626 m)   Wt 138 lb (62.6 kg)   SpO2 99%   BMI 23.69 kg/m²     Physical Exam  Exam conducted with a chaperone present. Constitutional:       General: She is not in acute distress. Appearance: Normal appearance. She is normal weight. She is not ill-appearing. Cardiovascular:      Rate and Rhythm: Normal rate and regular rhythm. Heart sounds: Normal heart sounds. No murmur heard. Pulmonary:      Effort: Pulmonary effort is normal. No respiratory distress. Breath sounds: Normal breath sounds. Genitourinary:     Labia:         Right: No rash. Left: No rash. Urethra: No prolapse, urethral pain, urethral swelling or urethral lesion. Vagina: Bleeding present. Uterus: Absent. Rectum: Normal. Guaiac result negative. No mass, tenderness, anal fissure, external hemorrhoid or internal hemorrhoid. Normal anal tone.       Comments: Cervix absent, vaginal cuff is short and narrow, tissue is atrophied  Skin:     General: Skin is warm and dry. 1. Malignant neoplasm of female breast, unspecified estrogen receptor status, unspecified laterality, unspecified site of breast ([facility] [address]  17 years ago. Followed by Dr. Sarkis Goyal. 2. Postmenopausal bleeding  Likely spotting from vaginal atrophy. No Active bleeding seen in urine, pelvic exam or rectal exam. Pap smear obtained. - AMB POC URINALYSIS DIP STICK AUTO W/O MICRO  - PAP IG, Aptima HPV and rfx 16/18,45 ([id number]); Future  - PAP IG, Aptima HPV and rfx 16/18,45 ([id number])    3. Atrophic vaginitis  Recommended lubrication to help prevent injury and bleeding. 4. Essential hypertension  Patient has lost 20 pounds intentionally. Will lower dose to 25 mg daily from 50 mg. 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 Advised to call if has increase in palpitations with lower dose of medication.  -     metoprolol succinate (TOPROL XL) 25 MG extended release tablet;  Take 1 tablet by mouth daily    Muna Patten MD

## 2022-06-16 LAB
CYTOLOGIST CVX/VAG CYTO: NORMAL
CYTOLOGY CVX/VAG DOC THIN PREP: NORMAL
HPV APTIMA: NEGATIVE
Lab: NORMAL
PATH REPORT.FINAL DX SPEC: NORMAL
STAT OF ADQ CVX/VAG CYTO-IMP: NORMAL

## 2022-09-19 ENCOUNTER — HOSPITAL ENCOUNTER (OUTPATIENT)
Dept: MAMMOGRAPHY | Age: 67
Discharge: HOME | End: 2022-09-22
Payer: MEDICARE

## 2022-09-19 DIAGNOSIS — Z78.0 POSTMENOPAUSAL: ICD-10-CM

## 2022-09-19 PROCEDURE — 77080 DXA BONE DENSITY AXIAL: CPT

## 2022-09-26 DIAGNOSIS — M85.851 OSTEOPENIA OF BOTH HIPS: Primary | ICD-10-CM

## 2022-09-26 DIAGNOSIS — M85.852 OSTEOPENIA OF BOTH HIPS: Primary | ICD-10-CM

## 2022-09-26 RX ORDER — ALENDRONATE SODIUM 70 MG/1
70 TABLET ORAL
Qty: 12 TABLET | Refills: 1 | Status: SHIPPED | OUTPATIENT
Start: 2022-09-26

## 2022-09-26 NOTE — PROGRESS NOTES
1. Osteopenia of both hips  Assessment & Plan:  DEXA Result (most recent):  DEXA BONE DENSITY AXIAL SKELETON 09/19/2022    Narrative  BONE MINERAL DENSITY    INDICATION:  Postmenopausal    COMPARISON: DEXA 11/3/2020    TECHNIQUE: Imaging was performed on a [employer]. World Health  Organization meta-analysis fracture risk calculator (FRAX) analysis was  performed for 10 year fracture risk probability assessment    FINDINGS:    RIGHT FEMORAL NECK:  Bone mineral density (gm/cm2):  0.766  T-score:  -2.0  Z-score:  -0.4  Total mean density of the hips has decreased by -1.4% since the prior study. LUMBAR SPINE:  Bone mineral density (gm/cm2):  1.100  T-score:  -0.8  Z-score:  0.8  Average density of the measured lumbar vertebrae has decreased by -0.2% since  the prior study. Impression  Using the World Health Organization criteria, the bone mineral density is low. Total mean density of the hips has decreased by -1.4% since the prior study. Average density of the measured lumbar vertebrae has decreased by -0.2% since  the prior study. 10 year probability of major osteoporotic fracture:  18.6%  10 year probability of hip fracture:  3.3%        The [facility] recommends that medical therapy be  considered in postmenopausal women and men age 48 years and older with a:  a. Hip or vertebral fracture,  b. T-score <= -2.5 in the spine or hip (osteoporotic), or  c.  T-score between -1.0 and -2.5 (low bone mass, osteopenic), and FRAX =>  3% for hip fracture or => 20% for major osteoporotic fracture.    -Discussed risks and benefits of of fosamax. In agreement to start medication (09/2022). Counseled on potential side effects. Discussed how to take medication correctly. Orders:  -     alendronate (FOSAMAX) 70 MG tablet;  Take 1 tablet by mouth every 7 days, Disp-12 tablet, R-1Normal

## 2022-10-17 ENCOUNTER — OFFICE VISIT (OUTPATIENT)
Dept: FAMILY MEDICINE CLINIC | Facility: CLINIC | Age: 67
End: 2022-10-17
Payer: MEDICARE

## 2022-10-17 DIAGNOSIS — M79.605 PAIN IN BOTH LOWER EXTREMITIES: ICD-10-CM

## 2022-10-17 DIAGNOSIS — K21.9 GASTROESOPHAGEAL REFLUX DISEASE, UNSPECIFIED WHETHER ESOPHAGITIS PRESENT: ICD-10-CM

## 2022-10-17 DIAGNOSIS — M85.89 OSTEOPENIA OF MULTIPLE SITES: Primary | ICD-10-CM

## 2022-10-17 DIAGNOSIS — I10 ESSENTIAL HYPERTENSION: ICD-10-CM

## 2022-10-17 DIAGNOSIS — M79.604 PAIN IN BOTH LOWER EXTREMITIES: ICD-10-CM

## 2022-10-17 DIAGNOSIS — I65.23 BILATERAL CAROTID ARTERY STENOSIS: ICD-10-CM

## 2022-10-17 DIAGNOSIS — E78.2 MIXED HYPERLIPIDEMIA: ICD-10-CM

## 2022-10-17 DIAGNOSIS — Z85.3 HISTORY OF BREAST CANCER: ICD-10-CM

## 2022-10-17 DIAGNOSIS — Z23 ENCOUNTER FOR IMMUNIZATION: ICD-10-CM

## 2022-10-17 LAB
25(OH)D3 SERPL-MCNC: 35.1 NG/ML (ref 30–100)
ALBUMIN SERPL-MCNC: 4.1 G/DL (ref 3.2–4.6)
ALBUMIN/GLOB SERPL: 1.2 {RATIO} (ref 0.4–1.6)
ALP SERPL-CCNC: 92 U/L (ref 50–136)
ALT SERPL-CCNC: 34 U/L (ref 12–65)
ANION GAP SERPL CALC-SCNC: 7 MMOL/L (ref 2–11)
AST SERPL-CCNC: 29 U/L (ref 15–37)
BILIRUB SERPL-MCNC: 0.6 MG/DL (ref 0.2–1.1)
BUN SERPL-MCNC: 14 MG/DL (ref 8–23)
CALCIUM SERPL-MCNC: 9.3 MG/DL (ref 8.3–10.4)
CHLORIDE SERPL-SCNC: 107 MMOL/L (ref 101–110)
CO2 SERPL-SCNC: 27 MMOL/L (ref 21–32)
CREAT SERPL-MCNC: 0.9 MG/DL (ref 0.6–1)
GLOBULIN SER CALC-MCNC: 3.3 G/DL (ref 2.8–4.5)
GLUCOSE SERPL-MCNC: 81 MG/DL (ref 65–100)
POTASSIUM SERPL-SCNC: 3.7 MMOL/L (ref 3.5–5.1)
PROT SERPL-MCNC: 7.4 G/DL (ref 6.3–8.2)
SODIUM SERPL-SCNC: 141 MMOL/L (ref 133–143)

## 2022-10-17 PROCEDURE — G8427 DOCREV CUR MEDS BY ELIG CLIN: HCPCS | Performed by: FAMILY MEDICINE

## 2022-10-17 PROCEDURE — G8399 PT W/DXA RESULTS DOCUMENT: HCPCS | Performed by: FAMILY MEDICINE

## 2022-10-17 PROCEDURE — 99214 OFFICE O/P EST MOD 30 MIN: CPT | Performed by: FAMILY MEDICINE

## 2022-10-17 PROCEDURE — 1090F PRES/ABSN URINE INCON ASSESS: CPT | Performed by: FAMILY MEDICINE

## 2022-10-17 PROCEDURE — 1123F ACP DISCUSS/DSCN MKR DOCD: CPT | Performed by: FAMILY MEDICINE

## 2022-10-17 PROCEDURE — G8420 CALC BMI NORM PARAMETERS: HCPCS | Performed by: FAMILY MEDICINE

## 2022-10-17 PROCEDURE — 1036F TOBACCO NON-USER: CPT | Performed by: FAMILY MEDICINE

## 2022-10-17 PROCEDURE — 3017F COLORECTAL CA SCREEN DOC REV: CPT | Performed by: FAMILY MEDICINE

## 2022-10-17 PROCEDURE — G0008 ADMIN INFLUENZA VIRUS VAC: HCPCS | Performed by: FAMILY MEDICINE

## 2022-10-17 PROCEDURE — 90694 VACC AIIV4 NO PRSRV 0.5ML IM: CPT | Performed by: FAMILY MEDICINE

## 2022-10-17 PROCEDURE — G8484 FLU IMMUNIZE NO ADMIN: HCPCS | Performed by: FAMILY MEDICINE

## 2022-10-17 RX ORDER — ATORVASTATIN CALCIUM 10 MG/1
10 TABLET, FILM COATED ORAL DAILY
Qty: 90 TABLET | Refills: 3 | Status: SHIPPED | OUTPATIENT
Start: 2022-10-17

## 2022-10-17 RX ORDER — THIAMINE MONONITRATE (VIT B1) 100 MG
50 TABLET ORAL DAILY
COMMUNITY

## 2022-10-17 RX ORDER — METOPROLOL SUCCINATE 25 MG/1
25 TABLET, EXTENDED RELEASE ORAL DAILY
Qty: 90 TABLET | Refills: 2 | Status: SHIPPED | OUTPATIENT
Start: 2022-10-17

## 2022-10-17 ASSESSMENT — ENCOUNTER SYMPTOMS
BACK PAIN: 0
SHORTNESS OF BREATH: 0
CHEST TIGHTNESS: 0
WHEEZING: 0

## 2022-10-17 ASSESSMENT — PATIENT HEALTH QUESTIONNAIRE - PHQ9
SUM OF ALL RESPONSES TO PHQ QUESTIONS 1-9: 0
SUM OF ALL RESPONSES TO PHQ QUESTIONS 1-9: 0
2. FEELING DOWN, DEPRESSED OR HOPELESS: 0
SUM OF ALL RESPONSES TO PHQ QUESTIONS 1-9: 0
SUM OF ALL RESPONSES TO PHQ9 QUESTIONS 1 & 2: 0
SUM OF ALL RESPONSES TO PHQ QUESTIONS 1-9: 0
1. LITTLE INTEREST OR PLEASURE IN DOING THINGS: 0

## 2022-10-17 NOTE — PROGRESS NOTES
Diego Mondragon is a 69 y.o. female who presents today for the following:  Chief Complaint   Patient presents with    Medication Check       Allergies   Allergen Reactions    Latex Anaphylaxis and Rash     The anaphylactic reaction was with latex paint    Lisinopril Anaphylaxis    Naproxen Sodium Anaphylaxis     Face swelled; throat closed up    Ondansetron Hcl Rash     Intense vasodilation with redness, hypotension, responded to epinephrine    Amlodipine Swelling     ankles swell       Current Outpatient Medications   Medication Sig Dispense Refill    alendronate (FOSAMAX) 70 MG tablet Take 1 tablet by mouth every 7 days 12 tablet 1    metoprolol succinate (TOPROL XL) 25 MG extended release tablet Take 1 tablet by mouth daily 90 tablet 2    ascorbic acid (VITAMIN C) 500 MG tablet Take by mouth      aspirin 81 MG EC tablet Take 81 mg by mouth daily      atorvastatin (LIPITOR) 10 MG tablet Take 10 mg by mouth      azithromycin (ZITHROMAX) 500 MG tablet Take 4 tabs PO once as needed for travelers diarrhea      cefpodoxime (VANTIN) 200 MG tablet Take one tab PO BID for 7 days as needed for urinary tract infection or sinus infection      diclofenac sodium (VOLTAREN) 1 % GEL Apply 2 g topically 4 times daily as needed      folic acid (FOLVITE) 932 MCG tablet Take 400 mcg by mouth daily      omeprazole (PRILOSEC) 20 MG delayed release capsule Take 20 mg by mouth daily      phytonadione (VITAMIN K) 5 MG tablet Take by mouth       No current facility-administered medications for this visit.        Past Medical History:   Diagnosis Date    Adverse effect of anesthesia     BP drops during surgery, no problem 2021    Allergic rhinitis 12/12/2018    Arthritis     DDD    Bilateral carotid artery stenosis 4/19/2018    Cancer (HCC)     breast, left    Chronic pain     pt denies presently- lumbar sometimes    GERD (gastroesophageal reflux disease)     well controlled    History of breast cancer 2/1/2018    ER/NV positive T2 NxER+SC+ double mastectomy 2006    HTN (hypertension)     120s/80s- controlled with med    Hyperlipidemia 4/19/2018    Latex allergy 2/1/2018    Osteoarthritis of cervical spine 11/28/2019    Thromboembolus ([address]     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 Emeterio Oconnor - sent for scanning    HEENT  02/2011    thyroglossal cyst removed    HYSTERECTOMY (CERVIX STATUS UNKNOWN)      MASTECTOMY Bilateral     x2    ORTHOPEDIC SURGERY Right 2021    thumb reconstruction,    OTHER SURGICAL HISTORY      thyroid cyst removed    SC ABDOMEN SURGERY PROC UNLISTED      breast breast reconstruction with abdominal liposuction    TONSILLECTOMY      UPPER GASTROINTESTINAL ENDOSCOPY  04/12/2007 4- - EGD - hiatal hernia and schatzki's ring no active esophagitis, gastritis or acid peptic injury - Dr. Montserrat Alexander - sent for scanning    UPPER GASTROINTESTINAL ENDOSCOPY  2020       Social History     Tobacco Use    Smoking status: Never    Smokeless tobacco: Never   Substance Use Topics    Alcohol use: No        Family History   Problem Relation Age of Onset    Stroke Mother     Breast Cancer Mother     Coronary Art Dis Neg Hx     Other Neg Hx     Post-op Cognitive Dysfunction Neg Hx     Emergence Delirium Neg Hx     Post-op Nausea/Vomiting Neg Hx     Delayed Awakening Neg Hx     Pseudochol.  Deficiency Neg Hx     Malig Hypertherm Neg Hx     Hypertension Brother     Heart Disease Paternal Grandfather     Stroke Paternal Grandmother     Hypertension Paternal Grandmother     Cancer Maternal Grandfather     Cancer Maternal Uncle     Heart Disease Brother         due to rheumatic fever-valve surgery Hypertension Brother     Colon Cancer Father        Patient is here today for routine follow up. She has a history of breast cancer in 2005 treated with bilateral mastectomy, chemotherapy, radiation and endocrine therapy. Also has pulmonary nodule followed with annual CT of chest, osteoarthritis, hypertension, osteopenia, and bilateral carotid artery stenosis with last dopplers in 2019 showed no significant stenosis. [place]. Since last appointment patient had repeat bone density test showed osteopenia with elevated FRAX score for hip fracture. Patient was in agreement to start fosamax for bone loss. Last vitamin D level was 52. She has not started fosamax. She has have a history of hiatal hernia and wants to make sure medication is safe to take. She has concerns today about sharp pains in legs. Reports pains would move around. Pain stopped when she stopped taking beet root supplement. She also has concerns regarding abnormal hemoglobin at blood donation center. Last check hemoglobin was normal but had recently donated. Review of Systems   Constitutional:  Negative for appetite change and fatigue. Respiratory:  Negative for chest tightness, shortness of breath and wheezing. Cardiovascular:  Negative for chest pain. Musculoskeletal:  Positive for myalgias. Negative for arthralgias and back pain. Neurological:  Negative for dizziness and headaches. /64   Pulse 65   Ht 5' 4\" (1.626 m)   Wt 136 lb (61.7 kg)   SpO2 100%   BMI 23.34 kg/m²     Physical Exam  Constitutional:       General: She is not in acute distress. Appearance: Normal appearance. She is normal weight. She is not ill-appearing. Cardiovascular:      Rate and Rhythm: Normal rate and regular rhythm. Heart sounds: Normal heart sounds. No murmur heard. Pulmonary:      Effort: Pulmonary effort is normal. No respiratory distress.       Breath sounds: Normal breath sounds. Skin:     General: Skin is warm and dry. 1. Osteopenia of multiple sites  Assessment & Plan:  DEXA BONE DENSITY AXIAL SKELETON 09/19/2022  Impression  Using the World Health Organization criteria, the bone mineral density is low. Total mean density of the hips has decreased by -1.4% since the prior study. Average density of the measured lumbar vertebrae has decreased by -0.2% since  the prior study. 10 year probability of major osteoporotic fracture:  18.6%  10 year probability of hip fracture:  3.3%    -Patient to try trial of fosamax. Counseled to notify office if does not tolerate  -last vitamin 52  Orders:  -     Vitamin D 25 Hydroxy; Future  2. Essential hypertension  Assessment & Plan:   Blood pressure is at goal for age. Encouraged continued medication compliance, DASH or Mediterranean diet and daily physical activity. Orders:  -     CBC with Auto Differential; Future  -     Comprehensive Metabolic Panel; Future  -     metoprolol succinate (TOPROL XL) 25 MG extended release tablet; Take 1 tablet by mouth daily, Disp-90 tablet, R-2Normal  3. History of breast cancer  Continues to follow up with oncology. 4. Gastroesophageal reflux disease, unspecified whether esophagitis present  Symptoms controlled with PPI. 5. Encounter for immunization  -     Influenza, FLUAD, (age 72 y+), IM, PF, 0.5 mL  6. Pain in both lower extremities  Beet root supplement is high in oxalate which may have caused muscle pain. 7. Mixed hyperlipidemia  Assessment & Plan: Tolerating statin without side effect. Last LDL 74. Orders:  -     atorvastatin (LIPITOR) 10 MG tablet; Take 1 tablet by mouth daily, Disp-90 tablet, R-3Normal  8. Bilateral carotid artery stenosis  Assessment & Plan:   Normal duplex in 2019. Patient informed, we will call with blood work results within one week. If you have not heard regarding results in over a week, please contact office.   You can also review results on mychart.            Corwin Mendoza MD

## 2022-10-18 LAB
BASOPHILS # BLD: 0 K/UL (ref 0–0.2)
BASOPHILS NFR BLD: 1 % (ref 0–2)
DIFFERENTIAL METHOD BLD: ABNORMAL
EOSINOPHIL # BLD: 0 K/UL (ref 0–0.8)
EOSINOPHIL NFR BLD: 0 % (ref 0.5–7.8)
ERYTHROCYTE [DISTWIDTH] IN BLOOD BY AUTOMATED COUNT: 14.8 % (ref 11.9–14.6)
HCT VFR BLD AUTO: 46.6 % (ref 35.8–46.3)
HGB BLD-MCNC: 14 G/DL (ref 11.7–15.4)
IMM GRANULOCYTES # BLD AUTO: 0 K/UL (ref 0–0.5)
IMM GRANULOCYTES NFR BLD AUTO: 0 % (ref 0–5)
LYMPHOCYTES # BLD: 1.3 K/UL (ref 0.5–4.6)
LYMPHOCYTES NFR BLD: 29 % (ref 13–44)
MCH RBC QN AUTO: 30.2 PG (ref 26.1–32.9)
MCHC RBC AUTO-ENTMCNC: 30 G/DL (ref 31.4–35)
MCV RBC AUTO: 100.4 FL (ref 82–102)
MONOCYTES # BLD: 0.2 K/UL (ref 0.1–1.3)
MONOCYTES NFR BLD: 5 % (ref 4–12)
NEUTS SEG # BLD: 2.9 K/UL (ref 1.7–8.2)
NEUTS SEG NFR BLD: 65 % (ref 43–78)
NRBC # BLD: 0 K/UL (ref 0–0.2)
PLATELET # BLD AUTO: 199 K/UL (ref 150–450)
PMV BLD AUTO: 10.7 FL (ref 9.4–12.3)
RBC # BLD AUTO: 4.64 M/UL (ref 4.05–5.2)
WBC # BLD AUTO: 4.5 K/UL (ref 4.3–11.1)

## 2022-10-18 NOTE — PATIENT INSTRUCTIONS
Patient Education        A Healthy Lifestyle: Care Instructions  A healthy lifestyle can help you feel good, have more energy, and stay at a weight that's healthy for you. You can share a healthy lifestyle with your friends and family. And you can do it on your own. Eat meals with your friends or family. You could try cooking together. Plan activities with other people. Go for a walk with a friend, try a free online fitness class, or join a sports league. Eat a variety of healthy foods. These include fruits, vegetables, whole grains, low-fat dairy, and lean protein. Choose healthy portions of food. You can use the Nutrition Facts label on food packages as a guide. Eat more fruits and vegetables. You could add vegetables to sandwiches or add fruit to cereal.  Drink water when you are thirsty. Limit soda, juice, and sports drinks. Try to exercise most days. Aim for at least 2½ hours of exercise each week. Keep moving. Work in the garden or take your dog on a walk. Use the stairs instead of the elevator. If you use tobacco or nicotine, try to quit. Ask your doctor about programs and medicines to help you quit. Limit alcohol. Men should have no more than 2 drinks a day. Women should have no more than 1. For some people, no alcohol is the best choice. Follow-up care is a key part of your treatment and safety. Be sure to make and go to all appointments, and call your doctor if you are having problems. It's also a good idea to know your test results and keep a list of the medicines you take. Where can you learn more? Go to https://[email or web address].[employer]. org and sign in to your [employer] account. Enter D726 in the [facility] box to learn more about \"A Healthy Lifestyle: Care Instructions. \"     If you do not have an account, please click on the \"Sign Up Now\" link. Current as of: March 9, 2022               Content Version: 13.4  © 6350-1339 Healthwise, Incorporated.    Care instructions adapted under license by [facility] ([place] campus). If you have questions about a medical condition or this instruction, always ask your healthcare professional. [address] any warranty or liability for your use of this information.

## 2022-10-18 NOTE — ASSESSMENT & PLAN NOTE
DEXA BONE DENSITY AXIAL SKELETON 09/19/2022  Impression  Using the World Health Organization criteria, the bone mineral density is low. Total mean density of the hips has decreased by -1.4% since the prior study. Average density of the measured lumbar vertebrae has decreased by -0.2% since  the prior study. 10 year probability of major osteoporotic fracture:  18.6%  10 year probability of hip fracture:  3.3%    -Patient to try trial of fosamax.   Counseled to notify office if does not tolerate  -last vitamin 52

## 2022-10-25 VITALS
HEART RATE: 65 BPM | SYSTOLIC BLOOD PRESSURE: 112 MMHG | BODY MASS INDEX: 25.27 KG/M2 | WEIGHT: 148 LBS | OXYGEN SATURATION: 100 % | DIASTOLIC BLOOD PRESSURE: 64 MMHG | HEIGHT: 64 IN

## 2022-11-10 ENCOUNTER — TELEPHONE (OUTPATIENT)
Dept: FAMILY MEDICINE CLINIC | Facility: CLINIC | Age: 67
End: 2022-11-10

## 2022-11-10 DIAGNOSIS — J01.90 ACUTE NON-RECURRENT SINUSITIS, UNSPECIFIED LOCATION: Primary | ICD-10-CM

## 2022-11-10 RX ORDER — CEFPODOXIME PROXETIL 200 MG/1
200 TABLET, FILM COATED ORAL 2 TIMES DAILY
Qty: 14 TABLET | Refills: 0 | Status: SHIPPED | OUTPATIENT
Start: 2022-11-10 | End: 2022-11-17

## 2022-11-10 NOTE — TELEPHONE ENCOUNTER
Patient called in, stating that she is out of town in [place]. States that she believes she has a sinus infection & would like something called in, if possible. She will be using [facility] DR LORENA VAIL (store [phone]) in [place], [place] # [phone].

## 2022-11-10 NOTE — TELEPHONE ENCOUNTER
Gray Pineda was seen today for medication refill. Diagnoses and all orders for this visit:    Acute non-recurrent sinusitis, unspecified location  -     cefpodoxime (VANTIN) 200 MG tablet;  Take 1 tablet by mouth 2 times daily for 7 days

## 2022-11-22 DIAGNOSIS — E78.2 MIXED HYPERLIPIDEMIA: ICD-10-CM

## 2022-11-22 RX ORDER — OMEPRAZOLE 20 MG/1
CAPSULE, DELAYED RELEASE ORAL
Qty: 90 CAPSULE | Refills: 0 | OUTPATIENT
Start: 2022-11-22

## 2022-11-22 RX ORDER — ATORVASTATIN CALCIUM 10 MG/1
TABLET, FILM COATED ORAL
Qty: 90 TABLET | Refills: 0 | OUTPATIENT
Start: 2022-11-22

## 2022-12-09 DIAGNOSIS — E78.2 MIXED HYPERLIPIDEMIA: ICD-10-CM

## 2022-12-09 RX ORDER — OMEPRAZOLE 20 MG/1
CAPSULE, DELAYED RELEASE ORAL
Qty: 90 CAPSULE | Refills: 2 | Status: SHIPPED | OUTPATIENT
Start: 2022-12-09

## 2022-12-09 RX ORDER — ATORVASTATIN CALCIUM 10 MG/1
TABLET, FILM COATED ORAL
Qty: 90 TABLET | Refills: 2 | Status: SHIPPED | OUTPATIENT
Start: 2022-12-09

## 2022-12-21 ENCOUNTER — TELEPHONE (OUTPATIENT)
Dept: FAMILY MEDICINE CLINIC | Facility: CLINIC | Age: 67
End: 2022-12-21

## 2022-12-21 NOTE — TELEPHONE ENCOUNTER
Pt called stating she think the fosamax is causing her back pain and will like to know what she should do? She wanted to get a call from you.

## 2023-03-06 SDOH — ECONOMIC STABILITY: INCOME INSECURITY: HOW HARD IS IT FOR YOU TO PAY FOR THE VERY BASICS LIKE FOOD, HOUSING, MEDICAL CARE, AND HEATING?: NOT HARD AT ALL

## 2023-03-06 SDOH — ECONOMIC STABILITY: HOUSING INSECURITY
IN THE LAST 12 MONTHS, WAS THERE A TIME WHEN YOU DID NOT HAVE A STEADY PLACE TO SLEEP OR SLEPT IN A SHELTER (INCLUDING NOW)?: NO

## 2023-03-06 SDOH — ECONOMIC STABILITY: TRANSPORTATION INSECURITY
IN THE PAST 12 MONTHS, HAS LACK OF TRANSPORTATION KEPT YOU FROM MEETINGS, WORK, OR FROM GETTING THINGS NEEDED FOR DAILY LIVING?: NO

## 2023-03-06 SDOH — HEALTH STABILITY: PHYSICAL HEALTH: ON AVERAGE, HOW MANY MINUTES DO YOU ENGAGE IN EXERCISE AT THIS LEVEL?: 40 MIN

## 2023-03-06 SDOH — HEALTH STABILITY: PHYSICAL HEALTH: ON AVERAGE, HOW MANY DAYS PER WEEK DO YOU ENGAGE IN MODERATE TO STRENUOUS EXERCISE (LIKE A BRISK WALK)?: 4 DAYS

## 2023-03-06 SDOH — ECONOMIC STABILITY: FOOD INSECURITY: WITHIN THE PAST 12 MONTHS, YOU WORRIED THAT YOUR FOOD WOULD RUN OUT BEFORE YOU GOT MONEY TO BUY MORE.: NEVER TRUE

## 2023-03-06 SDOH — ECONOMIC STABILITY: FOOD INSECURITY: WITHIN THE PAST 12 MONTHS, THE FOOD YOU BOUGHT JUST DIDN'T LAST AND YOU DIDN'T HAVE MONEY TO GET MORE.: NEVER TRUE

## 2023-03-06 ASSESSMENT — LIFESTYLE VARIABLES
HOW MANY STANDARD DRINKS CONTAINING ALCOHOL DO YOU HAVE ON A TYPICAL DAY: 0
HOW OFTEN DO YOU HAVE A DRINK CONTAINING ALCOHOL: NEVER
HOW OFTEN DO YOU HAVE A DRINK CONTAINING ALCOHOL: 1
HOW MANY STANDARD DRINKS CONTAINING ALCOHOL DO YOU HAVE ON A TYPICAL DAY: PATIENT DOES NOT DRINK
HOW OFTEN DO YOU HAVE SIX OR MORE DRINKS ON ONE OCCASION: 1

## 2023-03-06 ASSESSMENT — PATIENT HEALTH QUESTIONNAIRE - PHQ9
SUM OF ALL RESPONSES TO PHQ QUESTIONS 1-9: 0
2. FEELING DOWN, DEPRESSED OR HOPELESS: 0
SUM OF ALL RESPONSES TO PHQ9 QUESTIONS 1 & 2: 0
1. LITTLE INTEREST OR PLEASURE IN DOING THINGS: 0
SUM OF ALL RESPONSES TO PHQ QUESTIONS 1-9: 0

## 2023-03-07 ENCOUNTER — OFFICE VISIT (OUTPATIENT)
Dept: FAMILY MEDICINE CLINIC | Facility: CLINIC | Age: 68
End: 2023-03-07
Payer: MEDICARE

## 2023-03-07 ENCOUNTER — HOSPITAL ENCOUNTER (OUTPATIENT)
Dept: CT IMAGING | Age: 68
Discharge: HOME | End: 2023-03-10
Payer: MEDICARE

## 2023-03-07 VITALS
OXYGEN SATURATION: 99 % | BODY MASS INDEX: 25.61 KG/M2 | SYSTOLIC BLOOD PRESSURE: 132 MMHG | HEART RATE: 68 BPM | DIASTOLIC BLOOD PRESSURE: 80 MMHG | HEIGHT: 64 IN | WEIGHT: 150 LBS

## 2023-03-07 DIAGNOSIS — M85.852 OSTEOPENIA OF BOTH HIPS: ICD-10-CM

## 2023-03-07 DIAGNOSIS — I65.23 BILATERAL CAROTID ARTERY STENOSIS: ICD-10-CM

## 2023-03-07 DIAGNOSIS — M85.851 OSTEOPENIA OF BOTH HIPS: ICD-10-CM

## 2023-03-07 DIAGNOSIS — K21.9 GASTROESOPHAGEAL REFLUX DISEASE, UNSPECIFIED WHETHER ESOPHAGITIS PRESENT: ICD-10-CM

## 2023-03-07 DIAGNOSIS — M85.89 OSTEOPENIA OF MULTIPLE SITES: ICD-10-CM

## 2023-03-07 DIAGNOSIS — C50.919 MALIGNANT NEOPLASM OF FEMALE BREAST, UNSPECIFIED ESTROGEN RECEPTOR STATUS, UNSPECIFIED LATERALITY, UNSPECIFIED SITE OF BREAST (HCC): ICD-10-CM

## 2023-03-07 DIAGNOSIS — Z00.00 MEDICARE ANNUAL WELLNESS VISIT, SUBSEQUENT: Primary | ICD-10-CM

## 2023-03-07 DIAGNOSIS — E78.2 MIXED HYPERLIPIDEMIA: ICD-10-CM

## 2023-03-07 DIAGNOSIS — I10 ESSENTIAL HYPERTENSION: ICD-10-CM

## 2023-03-07 LAB
ALBUMIN SERPL-MCNC: 4.1 G/DL (ref 3.2–4.6)
ALBUMIN/GLOB SERPL: 1.4 (ref 0.4–1.6)
ALP SERPL-CCNC: 69 U/L (ref 50–136)
ALT SERPL-CCNC: 37 U/L (ref 12–65)
ANION GAP SERPL CALC-SCNC: 2 MMOL/L (ref 2–11)
AST SERPL-CCNC: 26 U/L (ref 15–37)
BASOPHILS # BLD: 0 K/UL (ref 0–0.2)
BASOPHILS NFR BLD: 1 % (ref 0–2)
BILIRUB SERPL-MCNC: 0.6 MG/DL (ref 0.2–1.1)
BUN SERPL-MCNC: 16 MG/DL (ref 8–23)
CALCIUM SERPL-MCNC: 9.1 MG/DL (ref 8.3–10.4)
CHLORIDE SERPL-SCNC: 109 MMOL/L (ref 101–110)
CHOLEST SERPL-MCNC: 171 MG/DL
CO2 SERPL-SCNC: 29 MMOL/L (ref 21–32)
CREAT SERPL-MCNC: 0.9 MG/DL (ref 0.6–1)
DIFFERENTIAL METHOD BLD: ABNORMAL
EOSINOPHIL # BLD: 0 K/UL (ref 0–0.8)
EOSINOPHIL NFR BLD: 0 % (ref 0.5–7.8)
ERYTHROCYTE [DISTWIDTH] IN BLOOD BY AUTOMATED COUNT: 12.9 % (ref 11.9–14.6)
GLOBULIN SER CALC-MCNC: 3 G/DL (ref 2.8–4.5)
GLUCOSE SERPL-MCNC: 102 MG/DL (ref 65–100)
HCT VFR BLD AUTO: 42.6 % (ref 35.8–46.3)
HDLC SERPL-MCNC: 81 MG/DL (ref 40–60)
HDLC SERPL: 2.1
HGB BLD-MCNC: 13.8 G/DL (ref 11.7–15.4)
IMM GRANULOCYTES # BLD AUTO: 0 K/UL (ref 0–0.5)
IMM GRANULOCYTES NFR BLD AUTO: 0 % (ref 0–5)
LDLC SERPL CALC-MCNC: 74 MG/DL
LYMPHOCYTES # BLD: 1.3 K/UL (ref 0.5–4.6)
LYMPHOCYTES NFR BLD: 27 % (ref 13–44)
MCH RBC QN AUTO: 30.7 PG (ref 26.1–32.9)
MCHC RBC AUTO-ENTMCNC: 32.4 G/DL (ref 31.4–35)
MCV RBC AUTO: 94.9 FL (ref 82–102)
MONOCYTES # BLD: 0.3 K/UL (ref 0.1–1.3)
MONOCYTES NFR BLD: 5 % (ref 4–12)
NEUTS SEG # BLD: 3.2 K/UL (ref 1.7–8.2)
NEUTS SEG NFR BLD: 67 % (ref 43–78)
NRBC # BLD: 0 K/UL (ref 0–0.2)
PLATELET # BLD AUTO: 192 K/UL (ref 150–450)
PMV BLD AUTO: 10.4 FL (ref 9.4–12.3)
POTASSIUM SERPL-SCNC: 4 MMOL/L (ref 3.5–5.1)
PROT SERPL-MCNC: 7.1 G/DL (ref 6.3–8.2)
RBC # BLD AUTO: 4.49 M/UL (ref 4.05–5.2)
SODIUM SERPL-SCNC: 140 MMOL/L (ref 133–143)
TRIGL SERPL-MCNC: 80 MG/DL (ref 35–150)
VLDLC SERPL CALC-MCNC: 16 MG/DL (ref 6–23)
WBC # BLD AUTO: 4.7 K/UL (ref 4.3–11.1)

## 2023-03-07 PROCEDURE — 3075F SYST BP GE 130 - 139MM HG: CPT | Performed by: FAMILY MEDICINE

## 2023-03-07 PROCEDURE — G0439 PPPS, SUBSEQ VISIT: HCPCS | Performed by: FAMILY MEDICINE

## 2023-03-07 PROCEDURE — 3017F COLORECTAL CA SCREEN DOC REV: CPT | Performed by: FAMILY MEDICINE

## 2023-03-07 PROCEDURE — 1123F ACP DISCUSS/DSCN MKR DOCD: CPT | Performed by: FAMILY MEDICINE

## 2023-03-07 PROCEDURE — 71250 CT THORAX DX C-: CPT

## 2023-03-07 PROCEDURE — 3079F DIAST BP 80-89 MM HG: CPT | Performed by: FAMILY MEDICINE

## 2023-03-07 PROCEDURE — G8484 FLU IMMUNIZE NO ADMIN: HCPCS | Performed by: FAMILY MEDICINE

## 2023-03-07 RX ORDER — OMEPRAZOLE 20 MG/1
CAPSULE, DELAYED RELEASE ORAL
Qty: 90 CAPSULE | Refills: 2 | Status: SHIPPED | OUTPATIENT
Start: 2023-03-07

## 2023-03-07 RX ORDER — METOPROLOL SUCCINATE 25 MG/1
25 TABLET, EXTENDED RELEASE ORAL DAILY
Qty: 90 TABLET | Refills: 2 | Status: SHIPPED | OUTPATIENT
Start: 2023-03-07

## 2023-03-07 RX ORDER — ATORVASTATIN CALCIUM 10 MG/1
TABLET, FILM COATED ORAL
Qty: 90 TABLET | Refills: 2 | Status: SHIPPED | OUTPATIENT
Start: 2023-03-07

## 2023-03-07 RX ORDER — ALENDRONATE SODIUM 70 MG/1
70 TABLET ORAL
Qty: 12 TABLET | Refills: 1 | Status: SHIPPED | OUTPATIENT
Start: 2023-03-07

## 2023-03-07 NOTE — PATIENT INSTRUCTIONS
Learning About Vision Tests  What are vision tests? The four most common vision tests are visual acuity tests, refraction, visual field tests, and color vision tests. Visual acuity (sharpness) tests  These tests are used: To see if you need glasses or contact lenses. To monitor an eye problem. To check an eye injury. Visual acuity tests are done as part of routine exams. You may also have this test when you get your 's license or apply for some types of jobs. Visual field tests  These tests are used: To check for vision loss in any area of your range of vision. To screen for certain eye diseases. To look for nerve damage after a stroke, head injury, or other problem that could reduce blood flow to the brain. Refraction and color tests  A refraction test is done to find the right prescription for glasses and contact lenses. A color vision test is done to check for color blindness. Color vision is often tested as part of a routine exam. You may also have this test when you apply for a job where recognizing different colors is important, such as , electronics, or the [employer]. How are vision tests done? Visual acuity test   You cover one eye at a time. You read aloud from a wall chart across the room. You read aloud from a small card that you hold in your hand. Refraction   You look into a special device. The device puts lenses of different strengths in front of each eye to see how strong your glasses or contact lenses need to be. Visual field tests   Your doctor may have you look through special machines. Or your doctor may simply have you stare straight ahead while they move a finger into and out of your field of vision. Color vision test   You look at pieces of printed test patterns in various colors. You say what number or symbol you see. Your doctor may have you trace the number or symbol using a pointer. How do these tests feel?   There is very little chance of having a problem from this test. If dilating drops are used for a vision test, they may make the eyes sting and cause a medicine taste in the mouth.  Follow-up care is a key part of your treatment and safety. Be sure to make and go to all appointments, and call your doctor if you are having problems. It's also a good idea to know your test results and keep a list of the medicines you take.  Where can you learn more?  Go to https://www.FIGS.net/patientEd and enter G551 to learn more about \"Learning About Vision Tests.\"  Current as of: October 12, 2022               Content Version: 13.5  © 9892-8091 [email or web address].   Care instructions adapted under license by [employer]. If you have questions about a medical condition or this instruction, always ask your healthcare professional. [email or web address] disclaims any warranty or liability for your use of this information.           Advance Directives: Care Instructions  Overview  An advance directive is a legal way to state your wishes at the end of your life. It tells your family and your doctor what to do if you can't say what you want.  There are two main types of advance directives. You can change them any time your wishes change.  Living will.  This form tells your family and your doctor your wishes about life support and other treatment. The form is also called a declaration.  Medical power of .  This form lets you name a person to make treatment decisions for you when you can't speak for yourself. This person is called a health care agent (health care proxy, health care surrogate). The form is also called a durable power of  for health care.  If you do not have an advance directive, decisions about your medical care may be made by a family member, or by a doctor or a  who doesn't know you.  It may help to think of an advance directive as a gift to the people who care for you. If you have one, they won't have to make tough  decisions by themselves. For more information, including forms for your state, see the [address] website (www.caringinfo.org/planning/advance-directives/). Follow-up care is a key part of your treatment and safety. Be sure to make and go to all appointments, and call your doctor if you are having problems. It's also a good idea to know your test results and keep a list of the medicines you take. What should you include in an advance directive? Many states have a unique advance directive form. (It may ask you to address specific issues.) Or you might use a universal form that's approved by many states. If your form doesn't tell you what to address, it may be hard to know what to include in your advance directive. Use the questions below to help you get started. Who do you want to make decisions about your medical care if you are not able to? What life-support measures do you want if you have a serious illness that gets worse over time or can't be cured? What are you most afraid of that might happen? (Maybe you're afraid of having pain, losing your independence, or being kept alive by machines.)  Where would you prefer to die? (Your home? A hospital? A nursing home?)  Do you want to donate your organs when you die? Do you want certain Protestant practices performed before you die? When should you call for help? Be sure to contact your doctor if you have any questions. Where can you learn more? Go to http://www.elias.com/ and enter R264 to learn more about \"Advance Directives: Care Instructions. \"  Current as of: June 16, 2022               Content Version: 13.5  © 5215-9925 [employer]. Care instructions adapted under license by [facility] ([facility]). If you have questions about a medical condition or this instruction, always ask your healthcare professional. [address] any warranty or liability for your use of this information.            A Healthy Heart: Care Instructions  Your Care Instructions     Coronary artery disease, also called heart disease, occurs when a substance called plaque builds up in the vessels that supply oxygen-rich blood to your heart muscle. This can narrow the blood vessels and reduce blood flow. A heart attack happens when blood flow is completely blocked. A high-fat diet, smoking, and other factors increase the risk of heart disease. Your doctor has found that you have a chance of having heart disease. You can do lots of things to keep your heart healthy. It may not be easy, but you can change your diet, exercise more, and quit smoking. These steps really work to lower your chance of heart disease. Follow-up care is a key part of your treatment and safety. Be sure to make and go to all appointments, and call your doctor if you are having problems. It's also a good idea to know your test results and keep a list of the medicines you take. How can you care for yourself at home? Diet    Use less salt when you cook and eat. This helps lower your blood pressure. Taste food before salting. Add only a little salt when you think you need it. With time, your taste buds will adjust to less salt.     Eat fewer snack items, fast foods, canned soups, and other high-salt, high-fat, processed foods.     Read food labels and try to avoid saturated and trans fats. They increase your risk of heart disease by raising cholesterol levels.     Limit the amount of solid fat-butter, margarine, and shortening-you eat. Use olive, peanut, or canola oil when you cook. Bake, broil, and steam foods instead of frying them.     Eat a variety of fruit and vegetables every day. Dark green, deep orange, red, or yellow fruits and vegetables are especially good for you. Examples include spinach, carrots, peaches, and berries.     Foods high in fiber can reduce your cholesterol and provide important vitamins and minerals.  High-fiber foods include whole-grain cereals and breads, oatmeal, beans, brown rice, citrus fruits, and apples.     Eat lean proteins. Heart-healthy proteins include seafood, lean meats and poultry, eggs, beans, peas, nuts, seeds, and soy products.     Limit drinks and foods with added sugar. These include candy, desserts, and soda pop.   Lifestyle changes    If your doctor recommends it, get more exercise. Walking is a good choice. Bit by bit, increase the amount you walk every day. Try for at least 30 minutes on most days of the week. You also may want to swim, bike, or do other activities.     Do not smoke. If you need help quitting, talk to your doctor about stop-smoking programs and medicines. These can increase your chances of quitting for good. Quitting smoking may be the most important step you can take to protect your heart. It is never too late to quit.     Limit alcohol to 2 drinks a day for men and 1 drink a day for women. Too much alcohol can cause health problems.     Manage other health problems such as diabetes, high blood pressure, and high cholesterol. If you think you may have a problem with alcohol or drug use, talk to your doctor.   Medicines    Take your medicines exactly as prescribed. Call your doctor if you think you are having a problem with your medicine.     If your doctor recommends aspirin, take the amount directed each day. Make sure you take aspirin and not another kind of pain reliever, such as acetaminophen (Tylenol).   When should you call for help?   Call 911 if you have symptoms of a heart attack. These may include:    Chest pain or pressure, or a strange feeling in the chest.     Sweating.     Shortness of breath.     Pain, pressure, or a strange feeling in the back, neck, jaw, or upper belly or in one or both shoulders or arms.     Lightheadedness or sudden weakness.     A fast or irregular heartbeat.   After you call 911, the  may tell you to chew 1 adult-strength or 2 to 4 low-dose aspirin. Wait for an ambulance. Do not try  to drive yourself. Watch closely for changes in your health, and be sure to contact your doctor if you have any problems. Where can you learn more? Go to http://www.elias.com/ and enter F075 to learn more about \"A Healthy Heart: Care Instructions. \"  Current as of: September 7, 2022               Content Version: 13.5  © 2006-2022 [employer]. Care instructions adapted under license by [facility] ([facility]). If you have questions about a medical condition or this instruction, always ask your healthcare professional. [address] any warranty or liability for your use of this information. Personalized Preventive Plan for Ari Lind - 3/7/2023  Medicare offers a range of preventive health benefits. Some of the tests and screenings are paid in full while other may be subject to a deductible, co-insurance, and/or copay. Some of these benefits include a comprehensive review of your medical history including lifestyle, illnesses that may run in your family, and various assessments and screenings as appropriate. After reviewing your medical record and screening and assessments performed today your provider may have ordered immunizations, labs, imaging, and/or referrals for you. A list of these orders (if applicable) as well as your Preventive Care list are included within your After Visit Summary for your review. Other Preventive Recommendations:    A preventive eye exam performed by an eye specialist is recommended every 1-2 years to screen for glaucoma; cataracts, macular degeneration, and other eye disorders. A preventive dental visit is recommended every 6 months. Try to get at least 150 minutes of exercise per week or 10,000 steps per day on a pedometer . Order or download the FREE \"Exercise & Physical Activity: Your Everyday Guide\" from [employer]. Call 7-283.799.9315 or search [employer] online.   You need 4260-4072 mg of calcium and 1285-4454 IU of vitamin D per day. It is possible to meet your calcium requirement with diet alone, but a vitamin D supplement is usually necessary to meet this goal.  When exposed to the sun, use a sunscreen that protects against both UVA and UVB radiation with an SPF of 30 or greater. Reapply every 2 to 3 hours or after sweating, drying off with a towel, or swimming. Always wear a seat belt when traveling in a car. Always wear a helmet when riding a bicycle or motorcycle.

## 2023-03-07 NOTE — PROGRESS NOTES
Medicare Annual Wellness Visit    Kika Hernandez is here for Medicare AWV    Assessment & Plan     Medicare annual wellness visit, subsequent   -Updated problem list, care team and history  -Discussed recommended vaccines which are up to date  -DEXA scan with osteopenia and elevated FRAX. Started fosamax 09/2022. Plan to repeat DEXA 2024.  -colon cancer screening due 2024 for 5 year follow up. Recommendations for Preventive Services Due: see orders and patient instructions/AVS.  Recommended screening schedule for the next 5-10 years is provided to the patient in written form: see Patient Instructions/AVS.     Return for Medicare Annual Wellness Visit in 1 year. Subjective     Patient's complete Health Risk Assessment and screening values have been reviewed and are found in Flowsheets. The following problems were reviewed today and where indicated follow up appointments were made and/or referrals ordered. Positive Risk Factor Screenings with Interventions:                   Hearing Screen:  Do you or your family notice any trouble with your hearing that hasn't been managed with hearing aids?: (!) Yes    Interventions:  Patient declines any further evaluation or treatment    Vision Screen:  Do you have difficulty driving, watching TV, or doing any of your daily activities because of your eyesight?: No  Have you had an eye exam within the past year?: (!) No  No results found. Interventions:   Patient encouraged to make appointment with their eye specialist      Advanced Directives:  Do you have a Living Will?: (!) No    Intervention:                         Objective   Vitals:    03/07/23 0901   BP: 132/80   Pulse: 68   SpO2: 99%   Weight: 150 lb (68 kg)   Height: 5' 4\" (1.626 m)      Body mass index is 25.75 kg/m².               Allergies   Allergen Reactions    Latex Anaphylaxis and Rash     The anaphylactic reaction was with latex paint    Lisinopril Anaphylaxis    Naproxen Sodium Anaphylaxis Face swelled; throat closed up    Ondansetron Hcl Rash     Intense vasodilation with redness, hypotension, responded to epinephrine    Amlodipine Swelling     ankles swell     Prior to Visit Medications    Medication Sig Taking? Authorizing Provider   atorvastatin (LIPITOR) 10 MG tablet Take 1 tablet by mouth nightly Yes Todd Snow MD   omeprazole (PRILOSEC) 20 MG delayed release capsule Take 1 capsule by mouth once daily Yes Todd Snow MD   metoprolol succinate (TOPROL XL) 25 MG extended release tablet Take 1 tablet by mouth daily Yes Todd Snow MD   alendronate (FOSAMAX) 70 MG tablet Take 1 tablet by mouth every 7 days Yes Todd Snow MD   ascorbic acid (VITAMIN C) 500 MG tablet Take by mouth Yes Ar Automatic Reconciliation   aspirin 81 MG EC tablet Take 81 mg by mouth daily Yes Ar Automatic Reconciliation   diclofenac sodium (VOLTAREN) 1 % GEL Apply 2 g topically 4 times daily as needed Yes Ar Automatic Reconciliation   phytonadione (VITAMIN K) 5 MG tablet Take by mouth Yes Ar Automatic Reconciliation       CareTeam (Including outside providers/suppliers regularly involved in providing care):   Patient Care Team:  Todd Snow MD as PCP - General  Todd Snow MD as PCP - Empaneled Provider  Joselito Hood MD as Physician     Reviewed and updated this visit:  Tobacco  Allergies  Med Hx  Surg Hx  Soc Hx  Fam Hx           Leah Adair is a 67 y.o. female who presents today for the following:    Chief Complaint   Patient presents with    Medicare AWV         Patient is here today for routine follow up. She has a history of breast cancer in 2005 treated with bilateral mastectomy, chemotherapy, radiation and endocrine therapy. Also has pulmonary nodule followed with annual CT of chest, osteoarthritis, hypertension, osteopenia, and bilateral carotid artery stenosis with last dopplers in 2019 showed no significant stenosis.  Patient [place] trips.   Since last appointment patient had repeat bone density test showed osteopenia with elevated FRAX score for hip fracture. Patient was in agreement to start fosamax for bone loss. Last vitamin D level was 34. She is taking fosamax weekly. Patient reports increased left hip pain. Walking helps with pain. Does sometimes feels a catch in hip. She has concerns regarding increased difficulty remembering people's names. Reports meets lots of new people and has trouble remembering their names. No issues with forgetting where she placed things. No issues with getting lost. She reports work has been very busy. She has been sleeping less because of tasks that need to be completed. Review of Systems       /80   Pulse 68   Ht 5' 4\" (1.626 m)   Wt 150 lb (68 kg)   SpO2 99%   BMI 25.75 kg/m²     Physical Exam  Constitutional:       General: She is not in acute distress. Appearance: Normal appearance. She is normal weight. She is not ill-appearing. Cardiovascular:      Rate and Rhythm: Normal rate and regular rhythm. Heart sounds: Normal heart sounds. No murmur heard. Pulmonary:      Effort: Pulmonary effort is normal. No respiratory distress. Breath sounds: Normal breath sounds. Skin:     General: Skin is warm and dry. 2. Malignant neoplasm of female breast, unspecified estrogen receptor status, unspecified laterality, unspecified site of breast ([facility] [address]  Continue to follow up with Dr. Haydee Kussmaul of oncology. No concerns today. 3. Essential hypertension  Assessment & Plan:   Blood pressure is at goal for age. Encouraged continued medication compliance, DASH or Mediterranean diet and daily physical activity. Orders:  -     Lipid Panel; Future  -     Comprehensive Metabolic Panel; Future  -     CBC with Auto Differential; Future  4.  Bilateral carotid artery stenosis  Assessment & Plan:   Normal duplex in 2019.  5. Gastroesophageal reflux disease, unspecified whether esophagitis present  Assessment & Plan:   Symptoms controlled with medication. Prior hiatal hernia  6. Mixed hyperlipidemia  Assessment & Plan: Tolerating statin without side effect. Last LDL 74.  7. Osteopenia of multiple sites  Assessment & Plan:  DEXA BONE DENSITY AXIAL SKELETON 09/19/2022  Impression  Using the World Health Organization criteria, the bone mineral density is low. Total mean density of the hips has decreased by -1.4% since the prior study. Average density of the measured lumbar vertebrae has decreased by -0.2% since  the prior study. 10 year probability of major osteoporotic fracture:  18.6%  10 year probability of hip fracture:  3.3%    -Patient to try trial of fosamax. Counseled to notify office if does not tolerate  -last vitamin D was 34             Follow-up and Dispositions    Return in 6 months (on 9/7/2023) for follow up and Medicare Annual Wellness Visit in 1 year.          MD Naima Hussein MD

## 2023-03-07 NOTE — ASSESSMENT & PLAN NOTE
DEXA BONE DENSITY AXIAL SKELETON 09/19/2022  Impression  Using the World Health Organization criteria, the bone mineral density is low. Total mean density of the hips has decreased by -1.4% since the prior study. Average density of the measured lumbar vertebrae has decreased by -0.2% since  the prior study. 10 year probability of major osteoporotic fracture:  18.6%  10 year probability of hip fracture:  3.3%    -Patient to try trial of fosamax.   Counseled to notify office if does not tolerate  -last vitamin D was 34

## 2023-03-10 ENCOUNTER — OFFICE VISIT (OUTPATIENT)
Dept: ONCOLOGY | Age: 68
End: 2023-03-10

## 2023-03-10 VITALS
WEIGHT: 151.1 LBS | HEART RATE: 73 BPM | DIASTOLIC BLOOD PRESSURE: 79 MMHG | OXYGEN SATURATION: 100 % | TEMPERATURE: 97.6 F | HEIGHT: 64 IN | BODY MASS INDEX: 25.8 KG/M2 | SYSTOLIC BLOOD PRESSURE: 145 MMHG | RESPIRATION RATE: 18 BRPM

## 2023-03-10 DIAGNOSIS — Z85.3 PERSONAL HISTORY OF MALIGNANT NEOPLASM OF BREAST: Primary | ICD-10-CM

## 2023-03-10 DIAGNOSIS — R91.8 MULTIPLE PULMONARY NODULES: ICD-10-CM

## 2023-03-10 ASSESSMENT — PATIENT HEALTH QUESTIONNAIRE - PHQ9
SUM OF ALL RESPONSES TO PHQ QUESTIONS 1-9: 0
SUM OF ALL RESPONSES TO PHQ9 QUESTIONS 1 & 2: 0
SUM OF ALL RESPONSES TO PHQ QUESTIONS 1-9: 0
SUM OF ALL RESPONSES TO PHQ QUESTIONS 1-9: 0
2. FEELING DOWN, DEPRESSED OR HOPELESS: 0
SUM OF ALL RESPONSES TO PHQ QUESTIONS 1-9: 0
1. LITTLE INTEREST OR PLEASURE IN DOING THINGS: 0

## 2023-03-10 NOTE — PATIENT INSTRUCTIONS
Patient Instructions from Today's Visit    Reason for Visit:  Follow up Breast Cancer     Diagnosis Information:  [email or web address]    Plan:  Lab results discussed     Follow Up: Follow up in 1 year     Recent Lab Results:  No visits with results within 3 Day(s) from this visit.    Latest known visit with results is:   Office Visit on 03/07/2023   Component Date Value Ref Range Status    WBC 03/07/2023 4.7  4.3 - 11.1 K/uL Final    RBC 03/07/2023 4.49  4.05 - 5.2 M/uL Final    Hemoglobin 03/07/2023 13.8  11.7 - 15.4 g/dL Final    Hematocrit 03/07/2023 42.6  35.8 - 46.3 % Final    MCV 03/07/2023 94.9  82 - 102 FL Final    MCH 03/07/2023 30.7  26.1 - 32.9 PG Final    MCHC 03/07/2023 32.4  31.4 - 35.0 g/dL Final    RDW 03/07/2023 12.9  11.9 - 14.6 % Final    Platelets 27/48/5795 192  150 - 450 K/uL Final    MPV 03/07/2023 10.4  9.4 - 12.3 FL Final    nRBC 03/07/2023 0.00  0.0 - 0.2 K/uL Final    **Note: Absolute NRBC parameter is now reported with Hemogram**    Differential Type 03/07/2023 AUTOMATED    Final    Seg Neutrophils 03/07/2023 67  43 - 78 % Final    Lymphocytes 03/07/2023 27  13 - 44 % Final    Monocytes 03/07/2023 5  4.0 - 12.0 % Final    Eosinophils % 03/07/2023 0 (A)  0.5 - 7.8 % Final    Basophils 03/07/2023 1  0.0 - 2.0 % Final    Immature Granulocytes 03/07/2023 0  0.0 - 5.0 % Final    Segs Absolute 03/07/2023 3.2  1.7 - 8.2 K/UL Final    Absolute Lymph # 03/07/2023 1.3  0.5 - 4.6 K/UL Final    Absolute Mono # 03/07/2023 0.3  0.1 - 1.3 K/UL Final    Absolute Eos # 03/07/2023 0.0  0.0 - 0.8 K/UL Final    Basophils Absolute 03/07/2023 0.0  0.0 - 0.2 K/UL Final    Absolute Immature Granulocyte 03/07/2023 0.0  0.0 - 0.5 K/UL Final    Sodium 03/07/2023 140  133 - 143 mmol/L Final    Potassium 03/07/2023 4.0  3.5 - 5.1 mmol/L Final    Chloride 03/07/2023 109  101 - 110 mmol/L Final    CO2 03/07/2023 29  21 - 32 mmol/L Final Anion Gap 03/07/2023 2  2 - 11 mmol/L Final    Glucose 03/07/2023 102 (A)  65 - 100 mg/dL Final    BUN 03/07/2023 16  8 - 23 MG/DL Final    Creatinine 03/07/2023 0.90  0.6 - 1.0 MG/DL Final    Est [place] Filt Rate 03/07/2023 >60  >60 ml/min/1.73m2 Final    Comment:   Pediatric calculator link: Juan.[email or web address] org/professionals/kdoqi/gfr_calculatorped    These results are not intended for use in patients <25years of age. eGFR results are calculated without a race factor using  the 2021 CKD-EPI equation. Careful clinical correlation is recommended, particularly when comparing to results calculated using previous equations. The CKD-EPI equation is less accurate in patients with extremes of muscle mass, extra-renal metabolism of creatinine, excessive creatine ingestion, or following therapy that affects renal tubular secretion.       Calcium 03/07/2023 9.1  8.3 - 10.4 MG/DL Final    Total Bilirubin 03/07/2023 0.6  0.2 - 1.1 MG/DL Final    ALT 03/07/2023 37  12 - 65 U/L Final    AST 03/07/2023 26  15 - 37 U/L Final    Alk Phosphatase 03/07/2023 69  50 - 136 U/L Final    Total Protein 03/07/2023 7.1  6.3 - 8.2 g/dL Final    Albumin 03/07/2023 4.1  3.2 - 4.6 g/dL Final    Globulin 03/07/2023 3.0  2.8 - 4.5 g/dL Final    Albumin/Globulin Ratio 03/07/2023 1.4  0.4 - 1.6   Final    Cholesterol, Total 03/07/2023 171  <200 MG/DL Final    Comment: Borderline High: 200-239 mg/dL  High: Greater than or equal to 240 mg/dL      Triglycerides 03/07/2023 80  35 - 150 MG/DL Final    Comment: Borderline High: 150-199 mg/dL, High: 200-499 mg/dL  Very High: Greater than or equal to 500 mg/dL      HDL 03/07/2023 81 (A)  40 - 60 MG/DL Final    LDL Calculated 03/07/2023 74  <100 MG/DL Final    Comment: Near Optimal: 100-129 mg/dL  Borderline High: 130-159, High: 160-189 mg/dL  Very High: Greater than or equal to 190 mg/dL      VLDL Cholesterol Calculated 03/07/2023 16  6.0 - 23.0 MG/DL Final    Chol/HDL Ratio 03/07/2023 2.1    Final Treatment Summary has been discussed and given to patient: N/A    -------------------------------------------------------------------------------------------------------------------  Please call our office at [phone] if you have any  of the following symptoms:   Fever of 100.5 or greater  Chills  Shortness of breath  Swelling or pain in one leg    After office hours an answering service is available and will contact a provider for emergencies or if you are experiencing any of the above symptoms. Patient does express an interest in My Chart. My Chart log in information explained on the after visit summary printout at the [address] desk.     Josey Lerner RN

## 2023-03-10 NOTE — PROGRESS NOTES
[facility]: Office Visit Established Patient    Chief Complaint:    Chief Complaint   Patient presents with    Follow-up         History of Present Illness:  Ms. Shayna Rosa is a 69 y.o. female who returns today for follow-up of resected breast cancer and pulmonary nodules. She underwent bilateral mastectomy for a T2 breast cancer in 2006, she completed 5 years of endocrine therapy and is currently on surveillance. She had some recurrent pulmonary infections for which Dr. Archer Lung had checked immunoglobulins which were normal.     Here for follow-up after CT chest.  We have been following several tiny nodules (<3mm) for years, these have not shown any growth or suspicious behavior. She is doing well clinically. She is now 17 years out from her breast cancer diagnosis. She has some chronic issues but dealing with these well. ECOG 0. She is now established with Dr. Oscar Dey for primary care. She has some pain in her right arm for the last few days, she gest random pains but these have been evaluated previously with no etiology uncovered. No pulmonary symptoms. 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 Review of Systems:  Constitutional: Negative. HENT: Negative. Eyes: Negative. Respiratory: Negative. Cardiovascular: Negative. Gastrointestinal: Negative. Genitourinary: Negative. Musculoskeletal: Negative. Skin: Negative. Neurological: Negative. Endo/Heme/Allergies: Negative. Psychiatric/Behavioral: Negative. All other systems reviewed and are negative.        Allergies   Allergen Reactions    Latex Anaphylaxis and Rash     The anaphylactic reaction was with latex paint    Lisinopril Anaphylaxis    Ondansetron Hcl Rash     Intense vasodilation with redness, hypotension, responded to epinephrine    Amlodipine Swelling     ankles swell     Past Medical History:   Diagnosis Date    Adverse effect of anesthesia     BP drops during surgery, no problem 2021    Allergic rhinitis 12/12/2018    Arthritis     DDD    Bilateral carotid artery stenosis 4/19/2018    Cancer ([address]     breast, left    Chronic back pain     Chronic pain     pt denies presently- lumbar sometimes    GERD (gastroesophageal reflux disease)     well controlled    History of breast cancer 2/1/2018    ER/MS positive T2 NxER+MS+ double mastectomy 2006    HTN (hypertension)     120s/80s- controlled with med    Hyperlipidemia 4/19/2018    Latex allergy 2/1/2018    Osteoarthritis of cervical spine 11/28/2019    Thromboembolus ([address]     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 Khanh Ya - sent for scanning    HEENT  02/2011    thyroglossal cyst removed    HYSTERECTOMY (CERVIX STATUS UNKNOWN)      HYSTERECTOMY, TOTAL ABDOMINAL (CERVIX REMOVED)      MASTECTOMY Bilateral     x2    ORTHOPEDIC SURGERY Right 2021    thumb reconstruction,    OTHER SURGICAL HISTORY      thyroid cyst removed    [place] UNLISTED PROCEDURE ABDOMEN PERITONEUM & OMENTUM      breast breast reconstruction with abdominal liposuction    TONSILLECTOMY      UPPER GASTROINTESTINAL ENDOSCOPY  04/12/2007 4- - EGD - hiatal hernia and schatzki's ring no active esophagitis, gastritis or acid peptic injury - Dr. Luis M Ramirez - sent for scanning    UPPER GASTROINTESTINAL ENDOSCOPY  2020     Family History   Problem Relation Age of Onset    Stroke Mother     Breast Cancer Mother     Hypertension Brother     Heart Disease Brother     High Blood Pressure Brother     Heart Disease Paternal Grandfather     Stroke Paternal Grandmother Hypertension Paternal Grandmother     Diabetes Paternal Grandmother     Cancer Maternal Grandfather     Cancer Maternal Uncle     Heart Disease Brother         due to rheumatic fever-valve surgery    Hypertension Brother     Colon Cancer Father     Cancer Maternal Uncle     Cancer Maternal Uncle     Colon Cancer Maternal Aunt     High Blood Pressure Brother     Coronary Art Dis Neg Hx     Other Neg Hx     Post-op Cognitive Dysfunction Neg Hx     Emergence Delirium Neg Hx     Post-op Nausea/Vomiting Neg Hx     Delayed Awakening Neg Hx     Pseudochol.  Deficiency Neg Hx     Malig Hypertherm Neg Hx      Social History     Socioeconomic History    Marital status:      Spouse name: Not on file    Number of children: Not on file    Years of education: Not on file    Highest education level: Not on file   Occupational History    Not on file   Tobacco Use    Smoking status: Never    Smokeless tobacco: Never   Substance and Sexual Activity    Alcohol use: No    Drug use: No    Sexual activity: Not on file   Other Topics Concern    Not on file   Social History Narrative    Lives with   1 daughter - lives in [facility]     Social Determinants of Health     Financial Resource Strain: Not on file   Food Insecurity: Not on file   Transportation Needs: Not on file   Physical Activity: Sufficiently Active    Days of Exercise per Week: 4 days    Minutes of Exercise per Session: 40 min   Stress: Not on file   Social Connections: Not on file   Intimate Partner Violence: Not on file   Housing Stability: Not on file     Current Outpatient Medications   Medication Sig Dispense Refill    Calcium Citrate-Vitamin D (CALCIUM CITRATE + D PO) Take 2 tablets by mouth in the morning and at bedtime      omeprazole (PRILOSEC) 20 MG delayed release capsule Take 1 capsule by mouth once daily 90 capsule 2    atorvastatin (LIPITOR) 10 MG tablet Take 1 tablet by mouth nightly 90 tablet 2    metoprolol succinate (TOPROL XL) 25 MG extended release tablet Take 1 tablet by mouth daily 90 tablet 2    alendronate (FOSAMAX) 70 MG tablet Take 1 tablet by mouth every 7 days 12 tablet 1    ascorbic acid (VITAMIN C) 500 MG tablet Take by mouth      aspirin 81 MG EC tablet Take 81 mg by mouth daily      diclofenac sodium (VOLTAREN) 1 % GEL Apply 2 g topically 4 times daily as needed      phytonadione (VITAMIN K) 5 MG tablet Take by mouth       No current facility-administered medications for this visit. OBJECTIVE:  BP (!) 145/79 (Site: Right Upper Arm, Position: Sitting)   Pulse 73   Temp 97.6 °F (36.4 °C)   Resp 18   Ht 5' 4\" (1.626 m)   Wt 151 lb 1.6 oz (68.5 kg)   SpO2 100%   BMI 25.94 kg/m²     Physical Exam:  Constitutional: Well developed, well nourished female in no acute distress, sitting comfortably in the exam room chair. HEENT: Normocephalic and atraumatic. Sclerae anicteric. Neck supple without JVD. No thyromegaly present. Lymph node   No palpable submandibular, cervical, supraclavicular lymph nodes. Skin Warm and dry. No bruising and no rash noted. No erythema. No pallor. Respiratory Lungs are clear to auscultation bilaterally without wheezes, rales or rhonchi, normal air exchange without accessory muscle use. CVS Normal rate, regular rhythm and normal S1 and S2. No murmurs, gallops, or rubs. Abdomen Soft, nontender and nondistended, normoactive bowel sounds. No palpable mass. No hepatosplenomegaly. Neuro Grossly nonfocal with no obvious sensory or motor deficits. MSK Normal range of motion in general.  No edema and no tenderness. Psych Appropriate mood and affect.       Labs:  Recent Results (from the past 96 hour(s))   CBC with Auto Differential    Collection Time: 03/07/23  9:23 AM   Result Value Ref Range    WBC 4.7 4.3 - 11.1 K/uL    RBC 4.49 4.05 - 5.2 M/uL    Hemoglobin 13.8 11.7 - 15.4 g/dL    Hematocrit 42.6 35.8 - 46.3 %    MCV 94.9 82 - 102 FL    MCH 30.7 26.1 - 32.9 PG    MCHC 32.4 31.4 - 35.0 g/dL    RDW 12.9 11.9 - 14.6 %    Platelets 354 779 - 486 K/uL    MPV 10.4 9.4 - 12.3 FL    nRBC 0.00 0.0 - 0.2 K/uL    Differential Type AUTOMATED      Seg Neutrophils 67 43 - 78 %    Lymphocytes 27 13 - 44 %    Monocytes 5 4.0 - 12.0 %    Eosinophils % 0 (L) 0.5 - 7.8 %    Basophils 1 0.0 - 2.0 %    Immature Granulocytes 0 0.0 - 5.0 %    Segs Absolute 3.2 1.7 - 8.2 K/UL    Absolute Lymph # 1.3 0.5 - 4.6 K/UL    Absolute Mono # 0.3 0.1 - 1.3 K/UL    Absolute Eos # 0.0 0.0 - 0.8 K/UL    Basophils Absolute 0.0 0.0 - 0.2 K/UL    Absolute Immature Granulocyte 0.0 0.0 - 0.5 K/UL   Comprehensive Metabolic Panel    Collection Time: 03/07/23  9:23 AM   Result Value Ref Range    Sodium 140 133 - 143 mmol/L    Potassium 4.0 3.5 - 5.1 mmol/L    Chloride 109 101 - 110 mmol/L    CO2 29 21 - 32 mmol/L    Anion Gap 2 2 - 11 mmol/L    Glucose 102 (H) 65 - 100 mg/dL    BUN 16 8 - 23 MG/DL    Creatinine 0.90 0.6 - 1.0 MG/DL    Est, Glom Filt Rate >60 >60 ml/min/1.73m2    Calcium 9.1 8.3 - 10.4 MG/DL    Total Bilirubin 0.6 0.2 - 1.1 MG/DL    ALT 37 12 - 65 U/L    AST 26 15 - 37 U/L    Alk Phosphatase 69 50 - 136 U/L    Total Protein 7.1 6.3 - 8.2 g/dL    Albumin 4.1 3.2 - 4.6 g/dL    Globulin 3.0 2.8 - 4.5 g/dL    Albumin/Globulin Ratio 1.4 0.4 - 1.6     Lipid Panel    Collection Time: 03/07/23  9:23 AM   Result Value Ref Range    Cholesterol, Total 171 <200 MG/DL    Triglycerides 80 35 - 150 MG/DL    HDL 81 (H) 40 - 60 MG/DL    LDL Calculated 74 <100 MG/DL    VLDL Cholesterol Calculated 16 6.0 - 23.0 MG/DL    Chol/HDL Ratio 2.1         Imaging:  CT chest without contrast    Result Date: 3/7/2023  Stable bilateral 2 and 3 mm pulmonary nodules. These nodules have been stable for 18 months. Could consider annual imaging surveillance if the patient is at increased risk for lung cancer. ASSESSMENT:   Diagnosis Orders   1. Personal history of malignant neoplasm of breast  CT CHEST WO CONTRAST      2.  Multiple pulmonary nodules  CT CHEST WO CONTRAST            PLAN:  Lab studies were personally reviewed. Breast cancer: treated in 2005 s/p bilateral mastectomy, chemotherapy, radiation and endocrine therapy, ER/MI positive. Off all breast cancer specific therapy, now on surveillance 17 years from treatment. Here for follow-up after CT chest.  We have been following several tiny nodules (<3mm) for years, these have not shown any growth or suspicious behavior. She is doing well clinically. She is now 17 years out from her breast cancer diagnosis. She has some chronic issues but dealing with these well. ECOG 0. She is now established with Dr. Jenita Merlin for primary care. She has some pain in her right arm for the last few days, she gest random pains but these have been evaluated previously with no etiology uncovered. No pulmonary symptoms. 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 CT reviewed and shows no progression of tiny pulmonary nodules, highly likely to be benign. We will repeat CT in 1 year to screen for progression of the nodules at her request.  No labs from us since she is off breast cancer specific therapy, these can be performed with primary care. No indication for surveillance breast imaging with bilateral mastectomy. All questions  were asked and answered to the best of my ability. F/u in 1 year for ongoing surveillance.            South Gutierrez MD  [facility]  [address] [place] [address]  Office : [phone]  Fax : [phone]

## 2023-05-08 ENCOUNTER — OFFICE VISIT (OUTPATIENT)
Dept: FAMILY MEDICINE CLINIC | Facility: CLINIC | Age: 68
End: 2023-05-08

## 2023-05-08 VITALS
OXYGEN SATURATION: 98 % | SYSTOLIC BLOOD PRESSURE: 160 MMHG | TEMPERATURE: 98.4 F | HEIGHT: 64 IN | BODY MASS INDEX: 25.94 KG/M2 | DIASTOLIC BLOOD PRESSURE: 90 MMHG | HEART RATE: 81 BPM

## 2023-05-08 DIAGNOSIS — J01.90 ACUTE NON-RECURRENT SINUSITIS, UNSPECIFIED LOCATION: ICD-10-CM

## 2023-05-08 DIAGNOSIS — J06.9 VIRAL URI: ICD-10-CM

## 2023-05-08 DIAGNOSIS — N39.0 URINARY TRACT INFECTION WITHOUT HEMATURIA, SITE UNSPECIFIED: ICD-10-CM

## 2023-05-08 DIAGNOSIS — R05.1 ACUTE COUGH: Primary | ICD-10-CM

## 2023-05-08 LAB
EXP DATE SOLUTION: NORMAL
EXP DATE SWAB: NORMAL
EXPIRATION DATE: NORMAL
LOT NUMBER POC: NORMAL
LOT NUMBER SOLUTION: NORMAL
LOT NUMBER SWAB: NORMAL
SARS-COV-2 RNA, POC: NEGATIVE

## 2023-05-08 RX ORDER — AZITHROMYCIN 250 MG/1
250 TABLET, FILM COATED ORAL SEE ADMIN INSTRUCTIONS
Qty: 6 TABLET | Refills: 0 | Status: SHIPPED | OUTPATIENT
Start: 2023-05-08 | End: 2023-05-13

## 2023-05-08 RX ORDER — AZITHROMYCIN 250 MG/1
250 TABLET, FILM COATED ORAL SEE ADMIN INSTRUCTIONS
Qty: 6 TABLET | Refills: 0 | Status: SHIPPED | OUTPATIENT
Start: 2023-05-08 | End: 2023-05-08

## 2023-05-08 RX ORDER — NITROFURANTOIN 25; 75 MG/1; MG/1
100 CAPSULE ORAL 2 TIMES DAILY
Qty: 14 CAPSULE | Refills: 0 | Status: SHIPPED | OUTPATIENT
Start: 2023-05-08 | End: 2023-05-15

## 2023-05-08 RX ORDER — CEFPODOXIME PROXETIL 200 MG/1
200 TABLET, FILM COATED ORAL 2 TIMES DAILY
Qty: 20 TABLET | Refills: 0 | Status: SHIPPED | OUTPATIENT
Start: 2023-05-08 | End: 2023-05-18

## 2023-05-08 ASSESSMENT — ENCOUNTER SYMPTOMS
CHEST TIGHTNESS: 0
SHORTNESS OF BREATH: 0
WHEEZING: 0
COUGH: 1
NAUSEA: 0
VOMITING: 0

## 2023-05-08 ASSESSMENT — PATIENT HEALTH QUESTIONNAIRE - PHQ9
SUM OF ALL RESPONSES TO PHQ QUESTIONS 1-9: 0
SUM OF ALL RESPONSES TO PHQ QUESTIONS 1-9: 0
SUM OF ALL RESPONSES TO PHQ9 QUESTIONS 1 & 2: 0
2. FEELING DOWN, DEPRESSED OR HOPELESS: 0
SUM OF ALL RESPONSES TO PHQ QUESTIONS 1-9: 0
1. LITTLE INTEREST OR PLEASURE IN DOING THINGS: 0
SUM OF ALL RESPONSES TO PHQ QUESTIONS 1-9: 0

## 2023-05-08 NOTE — PROGRESS NOTES
Edgar Dunn is a 69 y.o. female who presents today for the following:  Chief Complaint   Patient presents with    Chest Congestion    Head Congestion    Shortness of Breath    Otalgia     Left ear pain       Allergies   Allergen Reactions    Latex Anaphylaxis and Rash     The anaphylactic reaction was with latex paint    Lisinopril Anaphylaxis    Ondansetron Hcl Rash     Intense vasodilation with redness, hypotension, responded to epinephrine    Amlodipine Swelling     ankles swell       Current Outpatient Medications   Medication Sig Dispense Refill    Calcium Citrate-Vitamin D (CALCIUM CITRATE + D PO) Take 2 tablets by mouth in the morning and at bedtime      omeprazole (PRILOSEC) 20 MG delayed release capsule Take 1 capsule by mouth once daily 90 capsule 2    atorvastatin (LIPITOR) 10 MG tablet Take 1 tablet by mouth nightly 90 tablet 2    metoprolol succinate (TOPROL XL) 25 MG extended release tablet Take 1 tablet by mouth daily 90 tablet 2    alendronate (FOSAMAX) 70 MG tablet Take 1 tablet by mouth every 7 days 12 tablet 1    ascorbic acid (VITAMIN C) 500 MG tablet Take by mouth      aspirin 81 MG EC tablet Take 81 mg by mouth daily      diclofenac sodium (VOLTAREN) 1 % GEL Apply 2 g topically 4 times daily as needed      phytonadione (VITAMIN K) 5 MG tablet Take by mouth       No current facility-administered medications for this visit.        Past Medical History:   Diagnosis Date    Adverse effect of anesthesia     BP drops during surgery, no problem 2021    Allergic rhinitis 12/12/2018    Arthritis     DDD    Bilateral carotid artery stenosis 4/19/2018    Cancer ([address]     breast, left    Chronic back pain     Chronic pain     pt denies presently- lumbar sometimes    GERD (gastroesophageal reflux disease)     well controlled    History of breast cancer 2/1/2018    ER/DC positive T2 NxER+DC+ double mastectomy 2006    HTN (hypertension)     120s/80s- controlled with med    Hyperlipidemia 4/19/2018

## 2023-05-18 ENCOUNTER — OFFICE VISIT (OUTPATIENT)
Dept: FAMILY MEDICINE CLINIC | Facility: CLINIC | Age: 68
End: 2023-05-18
Payer: MEDICARE

## 2023-05-18 VITALS
WEIGHT: 152 LBS | HEART RATE: 78 BPM | SYSTOLIC BLOOD PRESSURE: 132 MMHG | OXYGEN SATURATION: 99 % | HEIGHT: 64 IN | DIASTOLIC BLOOD PRESSURE: 78 MMHG | BODY MASS INDEX: 25.95 KG/M2

## 2023-05-18 DIAGNOSIS — B35.4 TINEA CORPORIS: Primary | ICD-10-CM

## 2023-05-18 DIAGNOSIS — M72.2 PLANTAR FASCIITIS: ICD-10-CM

## 2023-05-18 PROCEDURE — 1123F ACP DISCUSS/DSCN MKR DOCD: CPT | Performed by: FAMILY MEDICINE

## 2023-05-18 PROCEDURE — 3017F COLORECTAL CA SCREEN DOC REV: CPT | Performed by: FAMILY MEDICINE

## 2023-05-18 PROCEDURE — G8417 CALC BMI ABV UP PARAM F/U: HCPCS | Performed by: FAMILY MEDICINE

## 2023-05-18 PROCEDURE — 1036F TOBACCO NON-USER: CPT | Performed by: FAMILY MEDICINE

## 2023-05-18 PROCEDURE — G8427 DOCREV CUR MEDS BY ELIG CLIN: HCPCS | Performed by: FAMILY MEDICINE

## 2023-05-18 PROCEDURE — G8399 PT W/DXA RESULTS DOCUMENT: HCPCS | Performed by: FAMILY MEDICINE

## 2023-05-18 PROCEDURE — 3078F DIAST BP <80 MM HG: CPT | Performed by: FAMILY MEDICINE

## 2023-05-18 PROCEDURE — 1090F PRES/ABSN URINE INCON ASSESS: CPT | Performed by: FAMILY MEDICINE

## 2023-05-18 PROCEDURE — 99213 OFFICE O/P EST LOW 20 MIN: CPT | Performed by: FAMILY MEDICINE

## 2023-05-18 PROCEDURE — 3075F SYST BP GE 130 - 139MM HG: CPT | Performed by: FAMILY MEDICINE

## 2023-05-18 RX ORDER — KETOCONAZOLE 20 MG/G
CREAM TOPICAL
Qty: 30 G | Refills: 0 | Status: SHIPPED | OUTPATIENT
Start: 2023-05-18

## 2023-05-18 ASSESSMENT — PATIENT HEALTH QUESTIONNAIRE - PHQ9
SUM OF ALL RESPONSES TO PHQ QUESTIONS 1-9: 0
2. FEELING DOWN, DEPRESSED OR HOPELESS: 0
1. LITTLE INTEREST OR PLEASURE IN DOING THINGS: 0
SUM OF ALL RESPONSES TO PHQ QUESTIONS 1-9: 0
SUM OF ALL RESPONSES TO PHQ9 QUESTIONS 1 & 2: 0

## 2023-05-18 ASSESSMENT — ENCOUNTER SYMPTOMS: SHORTNESS OF BREATH: 0

## 2023-09-04 DIAGNOSIS — M85.852 OSTEOPENIA OF BOTH HIPS: ICD-10-CM

## 2023-09-04 DIAGNOSIS — M85.851 OSTEOPENIA OF BOTH HIPS: ICD-10-CM

## 2023-09-06 ASSESSMENT — PATIENT HEALTH QUESTIONNAIRE - PHQ9
SUM OF ALL RESPONSES TO PHQ QUESTIONS 1-9: 0
SUM OF ALL RESPONSES TO PHQ QUESTIONS 1-9: 0
1. LITTLE INTEREST OR PLEASURE IN DOING THINGS: NOT AT ALL
2. FEELING DOWN, DEPRESSED OR HOPELESS: NOT AT ALL
2. FEELING DOWN, DEPRESSED OR HOPELESS: 0
SUM OF ALL RESPONSES TO PHQ9 QUESTIONS 1 & 2: 0
SUM OF ALL RESPONSES TO PHQ9 QUESTIONS 1 & 2: 0
SUM OF ALL RESPONSES TO PHQ QUESTIONS 1-9: 0
1. LITTLE INTEREST OR PLEASURE IN DOING THINGS: 0
SUM OF ALL RESPONSES TO PHQ QUESTIONS 1-9: 0

## 2023-09-07 ENCOUNTER — OFFICE VISIT (OUTPATIENT)
Dept: FAMILY MEDICINE CLINIC | Facility: CLINIC | Age: 68
End: 2023-09-07

## 2023-09-07 VITALS
WEIGHT: 157 LBS | HEART RATE: 72 BPM | BODY MASS INDEX: 26.8 KG/M2 | SYSTOLIC BLOOD PRESSURE: 128 MMHG | HEIGHT: 64 IN | DIASTOLIC BLOOD PRESSURE: 78 MMHG | TEMPERATURE: 98.2 F

## 2023-09-07 DIAGNOSIS — R73.09 ABNORMAL GLUCOSE: ICD-10-CM

## 2023-09-07 DIAGNOSIS — M85.89 OSTEOPENIA OF MULTIPLE SITES: ICD-10-CM

## 2023-09-07 DIAGNOSIS — K21.9 GASTROESOPHAGEAL REFLUX DISEASE, UNSPECIFIED WHETHER ESOPHAGITIS PRESENT: ICD-10-CM

## 2023-09-07 DIAGNOSIS — E78.2 MIXED HYPERLIPIDEMIA: ICD-10-CM

## 2023-09-07 DIAGNOSIS — I10 ESSENTIAL HYPERTENSION: ICD-10-CM

## 2023-09-07 DIAGNOSIS — I10 ESSENTIAL HYPERTENSION: Primary | ICD-10-CM

## 2023-09-07 DIAGNOSIS — M85.851 OSTEOPENIA OF BOTH HIPS: ICD-10-CM

## 2023-09-07 DIAGNOSIS — M54.32 LEFT SIDED SCIATICA: ICD-10-CM

## 2023-09-07 DIAGNOSIS — M85.852 OSTEOPENIA OF BOTH HIPS: ICD-10-CM

## 2023-09-07 RX ORDER — ALENDRONATE SODIUM 70 MG/1
70 TABLET ORAL
Qty: 12 TABLET | Refills: 1 | Status: SHIPPED | OUTPATIENT
Start: 2023-09-07

## 2023-09-07 RX ORDER — TIZANIDINE 4 MG/1
4 TABLET ORAL
Qty: 30 TABLET | Refills: 3 | Status: SHIPPED | OUTPATIENT
Start: 2023-09-07

## 2023-09-07 RX ORDER — OMEPRAZOLE 20 MG/1
CAPSULE, DELAYED RELEASE ORAL
Qty: 90 CAPSULE | Refills: 2 | Status: SHIPPED | OUTPATIENT
Start: 2023-09-07

## 2023-09-07 RX ORDER — LORATADINE 10 MG/1
10 TABLET ORAL DAILY
COMMUNITY

## 2023-09-07 RX ORDER — METOPROLOL SUCCINATE 25 MG/1
25 TABLET, EXTENDED RELEASE ORAL DAILY
Qty: 90 TABLET | Refills: 2 | Status: SHIPPED | OUTPATIENT
Start: 2023-09-07

## 2023-09-07 RX ORDER — ATORVASTATIN CALCIUM 10 MG/1
TABLET, FILM COATED ORAL
Qty: 90 TABLET | Refills: 2 | Status: SHIPPED | OUTPATIENT
Start: 2023-09-07

## 2023-09-07 ASSESSMENT — ENCOUNTER SYMPTOMS
NAUSEA: 0
CHEST TIGHTNESS: 0
VOMITING: 0
BACK PAIN: 1
SHORTNESS OF BREATH: 0
WHEEZING: 0

## 2023-09-07 NOTE — ASSESSMENT & PLAN NOTE
DEXA BONE DENSITY AXIAL SKELETON 09/19/2022  Impression  Using the World Health Organization criteria, the bone mineral density is low. Total mean density of the hips has decreased by -1.4% since the prior study. Average density of the measured lumbar vertebrae has decreased by -0.2% since  the prior study.   10 year probability of major osteoporotic fracture:  18.6%  10 year probability of hip fracture:  3.3%    -Patient started on fosamax and tolerating without side effect.   -last vitamin D was 34

## 2023-09-07 NOTE — PROGRESS NOTES
Valeri Pickens is a 77 y.o. female who presents today for the following:  Chief Complaint   Patient presents with    Follow-up     6 month follow up     Back Pain     Sciatic pain x5 days (worse at night)       Allergies   Allergen Reactions    Latex Anaphylaxis and Rash     The anaphylactic reaction was with latex paint    Lisinopril Anaphylaxis    Ondansetron Hcl Rash     Intense vasodilation with redness, hypotension, responded to epinephrine    Amlodipine Swelling     ankles swell       Current Outpatient Medications   Medication Sig Dispense Refill    ketoconazole (NIZORAL) 2 % cream Apply topically daily to affected area 30 g 0    Calcium Citrate-Vitamin D (CALCIUM CITRATE + D PO) Take 2 tablets by mouth in the morning and at bedtime      omeprazole (PRILOSEC) 20 MG delayed release capsule Take 1 capsule by mouth once daily 90 capsule 2    atorvastatin (LIPITOR) 10 MG tablet Take 1 tablet by mouth nightly 90 tablet 2    metoprolol succinate (TOPROL XL) 25 MG extended release tablet Take 1 tablet by mouth daily 90 tablet 2    alendronate (FOSAMAX) 70 MG tablet Take 1 tablet by mouth every 7 days 12 tablet 1    ascorbic acid (VITAMIN C) 500 MG tablet Take by mouth      aspirin 81 MG EC tablet Take 81 mg by mouth daily      diclofenac sodium (VOLTAREN) 1 % GEL Apply 2 g topically 4 times daily as needed      phytonadione (VITAMIN K) 5 MG tablet Take by mouth       No current facility-administered medications for this visit.        Past Medical History:   Diagnosis Date    Adverse effect of anesthesia     BP drops during surgery, no problem 2021    Allergic rhinitis 12/12/2018    Arthritis     DDD    Bilateral carotid artery stenosis 4/19/2018    Cancer ([address])     breast, left    Chronic back pain     Chronic pain     pt denies presently- lumbar sometimes    GERD (gastroesophageal reflux disease)     well controlled    History of breast cancer 2/1/2018    ER/ME positive T2 NxER+ME+ double mastectomy 2006    HTN

## 2023-09-08 LAB
25(OH)D3 SERPL-MCNC: 43.1 NG/ML (ref 30–100)
ALBUMIN SERPL-MCNC: 4.1 G/DL (ref 3.2–4.6)
ALBUMIN/GLOB SERPL: 1.3 (ref 0.4–1.6)
ALP SERPL-CCNC: 84 U/L (ref 50–136)
ALT SERPL-CCNC: 34 U/L (ref 12–65)
ANION GAP SERPL CALC-SCNC: 5 MMOL/L (ref 2–11)
AST SERPL-CCNC: 31 U/L (ref 15–37)
BASOPHILS # BLD: 0.1 K/UL (ref 0–0.2)
BASOPHILS NFR BLD: 1 % (ref 0–2)
BILIRUB SERPL-MCNC: 0.6 MG/DL (ref 0.2–1.1)
BUN SERPL-MCNC: 13 MG/DL (ref 8–23)
CALCIUM SERPL-MCNC: 9.2 MG/DL (ref 8.3–10.4)
CHLORIDE SERPL-SCNC: 109 MMOL/L (ref 101–110)
CO2 SERPL-SCNC: 30 MMOL/L (ref 21–32)
CREAT SERPL-MCNC: 1 MG/DL (ref 0.6–1)
DIFFERENTIAL METHOD BLD: ABNORMAL
EOSINOPHIL # BLD: 0 K/UL (ref 0–0.8)
EOSINOPHIL NFR BLD: 0 % (ref 0.5–7.8)
ERYTHROCYTE [DISTWIDTH] IN BLOOD BY AUTOMATED COUNT: 12.9 % (ref 11.9–14.6)
EST. AVERAGE GLUCOSE BLD GHB EST-MCNC: 111 MG/DL
GLOBULIN SER CALC-MCNC: 3.1 G/DL (ref 2.8–4.5)
GLUCOSE SERPL-MCNC: 94 MG/DL (ref 65–100)
HBA1C MFR BLD: 5.5 % (ref 4.8–5.6)
HCT VFR BLD AUTO: 43.6 % (ref 35.8–46.3)
HGB BLD-MCNC: 14.1 G/DL (ref 11.7–15.4)
IMM GRANULOCYTES # BLD AUTO: 0 K/UL (ref 0–0.5)
IMM GRANULOCYTES NFR BLD AUTO: 0 % (ref 0–5)
LYMPHOCYTES # BLD: 1.3 K/UL (ref 0.5–4.6)
LYMPHOCYTES NFR BLD: 25 % (ref 13–44)
MCH RBC QN AUTO: 30.3 PG (ref 26.1–32.9)
MCHC RBC AUTO-ENTMCNC: 32.3 G/DL (ref 31.4–35)
MCV RBC AUTO: 93.8 FL (ref 82–102)
MONOCYTES # BLD: 0.3 K/UL (ref 0.1–1.3)
MONOCYTES NFR BLD: 6 % (ref 4–12)
NEUTS SEG # BLD: 3.6 K/UL (ref 1.7–8.2)
NEUTS SEG NFR BLD: 68 % (ref 43–78)
NRBC # BLD: 0 K/UL (ref 0–0.2)
PLATELET # BLD AUTO: 204 K/UL (ref 150–450)
PMV BLD AUTO: 10.3 FL (ref 9.4–12.3)
POTASSIUM SERPL-SCNC: 4 MMOL/L (ref 3.5–5.1)
PROT SERPL-MCNC: 7.2 G/DL (ref 6.3–8.2)
RBC # BLD AUTO: 4.65 M/UL (ref 4.05–5.2)
SODIUM SERPL-SCNC: 144 MMOL/L (ref 133–143)
WBC # BLD AUTO: 5.3 K/UL (ref 4.3–11.1)

## 2023-09-13 ENCOUNTER — OFFICE VISIT (OUTPATIENT)
Dept: ORTHOPEDIC SURGERY | Age: 68
End: 2023-09-13
Payer: MEDICARE

## 2023-09-13 VITALS — HEIGHT: 64 IN | WEIGHT: 157 LBS | BODY MASS INDEX: 26.8 KG/M2

## 2023-09-13 DIAGNOSIS — M43.16 SPONDYLOLISTHESIS OF LUMBAR REGION: Primary | ICD-10-CM

## 2023-09-13 DIAGNOSIS — M51.36 DDD (DEGENERATIVE DISC DISEASE), LUMBAR: ICD-10-CM

## 2023-09-13 DIAGNOSIS — M47.816 LUMBAR SPONDYLOSIS: ICD-10-CM

## 2023-09-13 PROCEDURE — 99204 OFFICE O/P NEW MOD 45 MIN: CPT | Performed by: PHYSICIAN ASSISTANT

## 2023-09-13 PROCEDURE — 1090F PRES/ABSN URINE INCON ASSESS: CPT | Performed by: PHYSICIAN ASSISTANT

## 2023-09-13 PROCEDURE — G8417 CALC BMI ABV UP PARAM F/U: HCPCS | Performed by: PHYSICIAN ASSISTANT

## 2023-09-13 PROCEDURE — 1036F TOBACCO NON-USER: CPT | Performed by: PHYSICIAN ASSISTANT

## 2023-09-13 PROCEDURE — 1123F ACP DISCUSS/DSCN MKR DOCD: CPT | Performed by: PHYSICIAN ASSISTANT

## 2023-09-13 PROCEDURE — G8399 PT W/DXA RESULTS DOCUMENT: HCPCS | Performed by: PHYSICIAN ASSISTANT

## 2023-09-13 PROCEDURE — 3017F COLORECTAL CA SCREEN DOC REV: CPT | Performed by: PHYSICIAN ASSISTANT

## 2023-09-13 PROCEDURE — G8427 DOCREV CUR MEDS BY ELIG CLIN: HCPCS | Performed by: PHYSICIAN ASSISTANT

## 2023-09-13 RX ORDER — MELOXICAM 15 MG/1
15 TABLET ORAL DAILY
Qty: 30 TABLET | Refills: 2 | Status: SHIPPED | OUTPATIENT
Start: 2023-09-13 | End: 2023-12-12

## 2023-09-13 RX ORDER — GABAPENTIN 300 MG/1
CAPSULE ORAL
Qty: 60 CAPSULE | Refills: 2 | Status: SHIPPED | OUTPATIENT
Start: 2023-09-13 | End: 2023-11-30

## 2023-09-13 NOTE — PROGRESS NOTES
SUSPENSION      bladder tack    BREAST BIOPSY      x5    BREAST RECONSTRUCTION      x2    COLONOSCOPY  2019    wnl repeat 5 yrs    COLONOSCOPY  01/28/2013 1- - colonoscopy - normal - repeat 5 years due to hx polyps and fhx colon cancer - Dr. Ashley Haque - sent for scanning    HEENT  02/2011    thyroglossal cyst removed    HYSTERECTOMY (CERVIX STATUS UNKNOWN)      HYSTERECTOMY, TOTAL ABDOMINAL (CERVIX REMOVED)      MASTECTOMY Bilateral     x2    ORTHOPEDIC SURGERY Right 2021    thumb reconstruction,    OTHER SURGICAL HISTORY      thyroid cyst removed    [place] UNLISTED PROCEDURE ABDOMEN PERITONEUM & OMENTUM      breast breast reconstruction with abdominal liposuction    TONSILLECTOMY      UPPER GASTROINTESTINAL ENDOSCOPY  04/12/2007 4- - EGD - hiatal hernia and schatzki's ring no active esophagitis, gastritis or acid peptic injury - Dr. Shayna Escalante - sent for scanning    UPPER GASTROINTESTINAL ENDOSCOPY  2020     Family History:   Family History   Problem Relation Age of Onset    Stroke Mother     Breast Cancer Mother     Hypertension Brother     Heart Disease Brother     High Blood Pressure Brother     Heart Disease Paternal Grandfather     Stroke Paternal Grandmother     Hypertension Paternal Grandmother     Diabetes Paternal Grandmother     Cancer Maternal Grandfather     Cancer Maternal Uncle     Heart Disease Brother         due to rheumatic fever-valve surgery    Hypertension Brother     Colon Cancer Father     Cancer Maternal Uncle     Cancer Maternal Uncle     Colon Cancer Maternal Aunt     High Blood Pressure Brother     Coronary Art Dis Neg Hx     Other Neg Hx     Post-op Cognitive Dysfunction Neg Hx     Emergence Delirium Neg Hx     Post-op Nausea/Vomiting Neg Hx     Delayed Awakening Neg Hx     Pseudochol. Deficiency Neg Hx     Malig Hypertherm Neg Hx       Social History:   Social History     Tobacco Use    Smoking status: Never    Smokeless tobacco: Never   Substance Use Topics    Alcohol use:  No

## 2023-09-14 ENCOUNTER — OFFICE VISIT (OUTPATIENT)
Dept: ORTHOPEDIC SURGERY | Age: 68
End: 2023-09-14
Payer: MEDICARE

## 2023-09-14 VITALS — WEIGHT: 157 LBS | HEIGHT: 64 IN | BODY MASS INDEX: 26.8 KG/M2

## 2023-09-14 DIAGNOSIS — M54.16 LUMBAR RADICULOPATHY: Primary | ICD-10-CM

## 2023-09-14 PROCEDURE — G8399 PT W/DXA RESULTS DOCUMENT: HCPCS | Performed by: PHYSICIAN ASSISTANT

## 2023-09-14 PROCEDURE — 1123F ACP DISCUSS/DSCN MKR DOCD: CPT | Performed by: PHYSICIAN ASSISTANT

## 2023-09-14 PROCEDURE — G8427 DOCREV CUR MEDS BY ELIG CLIN: HCPCS | Performed by: PHYSICIAN ASSISTANT

## 2023-09-14 PROCEDURE — 1090F PRES/ABSN URINE INCON ASSESS: CPT | Performed by: PHYSICIAN ASSISTANT

## 2023-09-14 PROCEDURE — 99214 OFFICE O/P EST MOD 30 MIN: CPT | Performed by: PHYSICIAN ASSISTANT

## 2023-09-14 PROCEDURE — 3017F COLORECTAL CA SCREEN DOC REV: CPT | Performed by: PHYSICIAN ASSISTANT

## 2023-09-14 PROCEDURE — G8417 CALC BMI ABV UP PARAM F/U: HCPCS | Performed by: PHYSICIAN ASSISTANT

## 2023-09-14 PROCEDURE — 1036F TOBACCO NON-USER: CPT | Performed by: PHYSICIAN ASSISTANT

## 2023-09-14 NOTE — PROGRESS NOTES
09/14/23        Name: Frances Madrid  YOB: 1955  Gender: female  MRN: [id number]    CC: Follow-up (Mri results )       HPI: Frances Madrid is a 77 y.o. female who returns for Follow-up (Mri results )         Patient returns the office today for follow-up after lumbar MRI. History was obtained by patient      Meds/PSH/PMH/FH/SH: This information has been reviewed. ALLERGIES:   Allergies   Allergen Reactions    Latex Anaphylaxis and Rash     The anaphylactic reaction was with latex paint    Lisinopril Anaphylaxis    Ondansetron Hcl Rash     Intense vasodilation with redness, hypotension, responded to epinephrine    Amlodipine Swelling     ankles swell              Physical Examination:            Imaging:         MRI Result (most recent): MRI LUMBAR SPINE WO CONTRAST 09/13/2023    Narrative  Exam: MRI lumbar spine without contrast.  Indication: Spondylolisthesis, lumbar region; Spondylosis without myelopathy or  radiculopathy, lumbar region; Other intervertebral disc degeneration, lumbar  region  Comparison: Lumbar spine radiograph, 9/13/2023. MRI lumbar spine, 10/19/2015  Contrast: None  Technique: Multiplanar multisequence imaging of the lumbar spine was performed  without contrast.    FINDINGS:  There are 5 nonrib-bearing lumbar-type vertebral bodies. Dextroscoliosis  centered at L3-L4. Grade 1 anterolisthesis of L4 on L5. Alignment is unchanged  from most recent radiographs. Vertebral body heights are preserved. No fracture or aggressive marrow signal  abnormality. Benign osseous hemangioma in the L1 vertebral body. The conus  medullaris terminates in expected position. Cauda equina nerve roots are  unremarkable. Perivertebral soft tissues are unremarkable. Multilevel disc desiccation with varying degrees of disc space narrowing, most  advanced at L5-S1 where there are irregularities, disc vacuum phenomenon, and  mixed type I and II Modic change.     L1-L2: Diffuse disc

## 2023-09-19 ENCOUNTER — PATIENT MESSAGE (OUTPATIENT)
Dept: ORTHOPEDIC SURGERY | Age: 68
End: 2023-09-19

## 2023-09-22 RX ORDER — ALENDRONATE SODIUM 70 MG/1
TABLET ORAL
Qty: 12 TABLET | Refills: 0 | OUTPATIENT
Start: 2023-09-22

## 2023-09-26 ENCOUNTER — OFFICE VISIT (OUTPATIENT)
Dept: ORTHOPEDIC SURGERY | Age: 68
End: 2023-09-26
Payer: MEDICARE

## 2023-09-26 DIAGNOSIS — M54.16 LUMBAR RADICULOPATHY: Primary | ICD-10-CM

## 2023-09-26 DIAGNOSIS — M47.816 LUMBAR SPONDYLOSIS: ICD-10-CM

## 2023-09-26 DIAGNOSIS — M43.16 SPONDYLOLISTHESIS OF LUMBAR REGION: ICD-10-CM

## 2023-09-26 DIAGNOSIS — M51.36 DDD (DEGENERATIVE DISC DISEASE), LUMBAR: ICD-10-CM

## 2023-09-26 PROCEDURE — 64483 NJX AA&/STRD TFRM EPI L/S 1: CPT | Performed by: PHYSICAL MEDICINE & REHABILITATION

## 2023-09-26 PROCEDURE — 64484 NJX AA&/STRD TFRM EPI L/S EA: CPT | Performed by: PHYSICAL MEDICINE & REHABILITATION

## 2023-09-26 RX ORDER — TRIAMCINOLONE ACETONIDE 40 MG/ML
40 INJECTION, SUSPENSION INTRA-ARTICULAR; INTRAMUSCULAR ONCE
Status: COMPLETED | OUTPATIENT
Start: 2023-09-26 | End: 2023-09-26

## 2023-09-26 RX ADMIN — TRIAMCINOLONE ACETONIDE 40 MG: 40 INJECTION, SUSPENSION INTRA-ARTICULAR; INTRAMUSCULAR at 14:16

## 2023-09-26 NOTE — PROGRESS NOTES
Name: Sanya Gonzalez  YOB: 1955  Gender: female  MRN: [id number]        Transforaminal MICHELLE Procedure Note    Procedure: Left  L3-L4 and L4-L5 transforaminal epidural steroid injections     Precautions: Sanya Gonzalez denies prior sensitivity to steroid, local anesthetic, iodine, or shellfish. Consent:  Consent was obtained prior to the procedure. The procedure was discussed at length with Sanya Gonzalez. She was given the opportunity to ask questions regarding the procedure and its associated risks. In addition to the potential risks associated with the procedure itself, the patient was informed both verbally and in writing of potential side effects of the use glucocorticoids. The patient appeared to comprehend the informed consent and desired to have the procedure performed, and informed consent was signed. She was placed in a prone position on the fluoroscopy table and the skin was prepped and draped in a routine sterile fashion. The areas to be injected were each anesthetized with 1 ml of 1% Lidocaine. A 22 gauge 3.5 inch spinal needle was carefully advanced under fluoroscopic guidance to the left L3-L4 transforaminal space  0.5 ml of 70% of Omnipaque was injected to confirm proper needle placement and absence of subdural or vascular flow Once proper placement was confirmed, 0.5ml of 0.25 marcaine and 40 mg kenalog were injected through the spinal needle. The above procedure was then repeated at the Left  L4-L5 transforaminal space. Fluoroscopic guidance was used intermittently over a 10-minute period to insure proper needle placement and her safety. A hard copy of the fluoroscopic image has been placed in her chart and is saved on the C-arm hard drive. She was monitored for 30 minutes after the procedure and discharged home in a stable fashion with a routine follow up.     Procedural Diagnosis:     ICD-10-CM    1. Lumbar radiculopathy  M54.16 FL NERVE

## 2023-10-02 ENCOUNTER — TELEPHONE (OUTPATIENT)
Dept: ORTHOPEDIC SURGERY | Age: 68
End: 2023-10-02

## 2023-10-02 ENCOUNTER — OFFICE VISIT (OUTPATIENT)
Dept: FAMILY MEDICINE CLINIC | Facility: CLINIC | Age: 68
End: 2023-10-02
Payer: MEDICARE

## 2023-10-02 VITALS
TEMPERATURE: 97.9 F | SYSTOLIC BLOOD PRESSURE: 136 MMHG | WEIGHT: 156 LBS | HEIGHT: 65 IN | OXYGEN SATURATION: 99 % | HEART RATE: 80 BPM | BODY MASS INDEX: 25.99 KG/M2 | DIASTOLIC BLOOD PRESSURE: 98 MMHG

## 2023-10-02 DIAGNOSIS — L30.9 DERMATITIS: Primary | ICD-10-CM

## 2023-10-02 PROCEDURE — G8417 CALC BMI ABV UP PARAM F/U: HCPCS | Performed by: FAMILY MEDICINE

## 2023-10-02 PROCEDURE — 1036F TOBACCO NON-USER: CPT | Performed by: FAMILY MEDICINE

## 2023-10-02 PROCEDURE — 3078F DIAST BP <80 MM HG: CPT | Performed by: FAMILY MEDICINE

## 2023-10-02 PROCEDURE — 99214 OFFICE O/P EST MOD 30 MIN: CPT | Performed by: FAMILY MEDICINE

## 2023-10-02 PROCEDURE — 1123F ACP DISCUSS/DSCN MKR DOCD: CPT | Performed by: FAMILY MEDICINE

## 2023-10-02 PROCEDURE — 3017F COLORECTAL CA SCREEN DOC REV: CPT | Performed by: FAMILY MEDICINE

## 2023-10-02 PROCEDURE — G8484 FLU IMMUNIZE NO ADMIN: HCPCS | Performed by: FAMILY MEDICINE

## 2023-10-02 PROCEDURE — 1090F PRES/ABSN URINE INCON ASSESS: CPT | Performed by: FAMILY MEDICINE

## 2023-10-02 PROCEDURE — G8399 PT W/DXA RESULTS DOCUMENT: HCPCS | Performed by: FAMILY MEDICINE

## 2023-10-02 PROCEDURE — G8427 DOCREV CUR MEDS BY ELIG CLIN: HCPCS | Performed by: FAMILY MEDICINE

## 2023-10-02 PROCEDURE — 3074F SYST BP LT 130 MM HG: CPT | Performed by: FAMILY MEDICINE

## 2023-10-02 RX ORDER — TRIAMCINOLONE ACETONIDE 1 MG/G
CREAM TOPICAL
Qty: 30 G | Refills: 2 | Status: SHIPPED | OUTPATIENT
Start: 2023-10-02

## 2023-10-02 RX ORDER — METHYLPREDNISOLONE 4 MG/1
TABLET ORAL
Qty: 1 KIT | Refills: 0 | Status: SHIPPED | OUTPATIENT
Start: 2023-10-02 | End: 2023-10-08

## 2023-10-02 NOTE — TELEPHONE ENCOUNTER
She got an inj on Tuesday. On Saturday she got a rash all over both legs. It gets worse everyday. It does itch. She is asking for a call to discuss.

## 2023-10-02 NOTE — TELEPHONE ENCOUNTER
Call returned to patient and message was left. Patient has also contacted the office through [address] [place] [address] and response in regards to rash has been sent.

## 2023-10-03 ASSESSMENT — ENCOUNTER SYMPTOMS
CHEST TIGHTNESS: 0
BACK PAIN: 1
SHORTNESS OF BREATH: 0

## 2023-10-09 ENCOUNTER — OFFICE VISIT (OUTPATIENT)
Dept: FAMILY MEDICINE CLINIC | Facility: CLINIC | Age: 68
End: 2023-10-09
Payer: MEDICARE

## 2023-10-09 ENCOUNTER — PATIENT MESSAGE (OUTPATIENT)
Dept: FAMILY MEDICINE CLINIC | Facility: CLINIC | Age: 68
End: 2023-10-09

## 2023-10-09 VITALS
BODY MASS INDEX: 25.83 KG/M2 | WEIGHT: 155 LBS | OXYGEN SATURATION: 99 % | SYSTOLIC BLOOD PRESSURE: 144 MMHG | HEART RATE: 83 BPM | TEMPERATURE: 98.2 F | DIASTOLIC BLOOD PRESSURE: 84 MMHG | HEIGHT: 65 IN

## 2023-10-09 DIAGNOSIS — B35.4 TINEA CORPORIS: ICD-10-CM

## 2023-10-09 DIAGNOSIS — B35.4 TINEA CORPORIS: Primary | ICD-10-CM

## 2023-10-09 DIAGNOSIS — R21 RASH OF ENTIRE BODY: ICD-10-CM

## 2023-10-09 DIAGNOSIS — L29.9 ITCHING: ICD-10-CM

## 2023-10-09 PROCEDURE — 3017F COLORECTAL CA SCREEN DOC REV: CPT | Performed by: NURSE PRACTITIONER

## 2023-10-09 PROCEDURE — G8484 FLU IMMUNIZE NO ADMIN: HCPCS | Performed by: NURSE PRACTITIONER

## 2023-10-09 PROCEDURE — 1036F TOBACCO NON-USER: CPT | Performed by: NURSE PRACTITIONER

## 2023-10-09 PROCEDURE — G8399 PT W/DXA RESULTS DOCUMENT: HCPCS | Performed by: NURSE PRACTITIONER

## 2023-10-09 PROCEDURE — G8427 DOCREV CUR MEDS BY ELIG CLIN: HCPCS | Performed by: NURSE PRACTITIONER

## 2023-10-09 PROCEDURE — 3077F SYST BP >= 140 MM HG: CPT | Performed by: NURSE PRACTITIONER

## 2023-10-09 PROCEDURE — 99213 OFFICE O/P EST LOW 20 MIN: CPT | Performed by: NURSE PRACTITIONER

## 2023-10-09 PROCEDURE — 1123F ACP DISCUSS/DSCN MKR DOCD: CPT | Performed by: NURSE PRACTITIONER

## 2023-10-09 PROCEDURE — 3079F DIAST BP 80-89 MM HG: CPT | Performed by: NURSE PRACTITIONER

## 2023-10-09 PROCEDURE — G8417 CALC BMI ABV UP PARAM F/U: HCPCS | Performed by: NURSE PRACTITIONER

## 2023-10-09 PROCEDURE — 1090F PRES/ABSN URINE INCON ASSESS: CPT | Performed by: NURSE PRACTITIONER

## 2023-10-09 RX ORDER — HYDROXYZINE HYDROCHLORIDE 25 MG/1
25 TABLET, FILM COATED ORAL EVERY 8 HOURS PRN
Qty: 30 TABLET | Refills: 0 | Status: SHIPPED | OUTPATIENT
Start: 2023-10-09 | End: 2023-10-19

## 2023-10-09 RX ORDER — KETOCONAZOLE 20 MG/G
CREAM TOPICAL
Qty: 30 G | Refills: 0 | OUTPATIENT
Start: 2023-10-09

## 2023-10-09 RX ORDER — TERBINAFINE HYDROCHLORIDE 250 MG/1
250 TABLET ORAL DAILY
Qty: 14 TABLET | Refills: 0 | Status: SHIPPED | OUTPATIENT
Start: 2023-10-09 | End: 2023-10-23

## 2023-10-09 ASSESSMENT — ENCOUNTER SYMPTOMS
GASTROINTESTINAL NEGATIVE: 1
RESPIRATORY NEGATIVE: 1
ALLERGIC/IMMUNOLOGIC NEGATIVE: 1
EYES NEGATIVE: 1

## 2023-10-10 ENCOUNTER — APPOINTMENT (RX ONLY)
Dept: URBAN - METROPOLITAN AREA CLINIC 329 | Facility: CLINIC | Age: 68
Setting detail: DERMATOLOGY
End: 2023-10-10

## 2023-10-10 DIAGNOSIS — L24 IRRITANT CONTACT DERMATITIS: ICD-10-CM

## 2023-10-10 DIAGNOSIS — D485 NEOPLASM OF UNCERTAIN BEHAVIOR OF SKIN: ICD-10-CM

## 2023-10-10 PROBLEM — D48.5 NEOPLASM OF UNCERTAIN BEHAVIOR OF SKIN: Status: ACTIVE | Noted: 2023-10-10

## 2023-10-10 PROBLEM — L24.9 IRRITANT CONTACT DERMATITIS, UNSPECIFIED CAUSE: Status: ACTIVE | Noted: 2023-10-10

## 2023-10-10 PROCEDURE — ? MEDICATION COUNSELING

## 2023-10-10 PROCEDURE — 11104 PUNCH BX SKIN SINGLE LESION: CPT

## 2023-10-10 PROCEDURE — ? BIOPSY BY PUNCH METHOD

## 2023-10-10 PROCEDURE — ? PRESCRIPTION MEDICATION MANAGEMENT

## 2023-10-10 PROCEDURE — 99203 OFFICE O/P NEW LOW 30 MIN: CPT | Mod: 25

## 2023-10-10 PROCEDURE — ? PHOTO-DOCUMENTATION

## 2023-10-10 PROCEDURE — ? PRESCRIPTION

## 2023-10-10 PROCEDURE — ? COUNSELING

## 2023-10-10 RX ORDER — KETOCONAZOLE 20 MG/G
CREAM TOPICAL
Qty: 60 G | Refills: 1 | Status: SHIPPED | OUTPATIENT
Start: 2023-10-10

## 2023-10-10 RX ORDER — TRIAMCINOLONE ACETONIDE 1 MG/G
CREAM TOPICAL
Qty: 454 | Refills: 2 | Status: ERX | COMMUNITY
Start: 2023-10-10

## 2023-10-10 RX ORDER — PREDNISONE 10 MG/1
TABLET ORAL
Qty: 31 | Refills: 0 | Status: ERX | COMMUNITY
Start: 2023-10-10

## 2023-10-10 RX ADMIN — TRIAMCINOLONE ACETONIDE: 1 CREAM TOPICAL at 00:00

## 2023-10-10 RX ADMIN — PREDNISONE: 10 TABLET ORAL at 00:00

## 2023-10-10 ASSESSMENT — LOCATION DETAILED DESCRIPTION DERM: LOCATION DETAILED: LEFT ANTERIOR DISTAL THIGH

## 2023-10-10 ASSESSMENT — LOCATION ZONE DERM: LOCATION ZONE: LEG

## 2023-10-10 ASSESSMENT — LOCATION SIMPLE DESCRIPTION DERM: LOCATION SIMPLE: LEFT THIGH

## 2023-10-10 NOTE — PROCEDURE: MEDICATION COUNSELING
Cardiac Propranolol Counseling:  I discussed with the patient the risks of propranolol including but not limited to low heart rate, low blood pressure, low blood sugar, restlessness and increased cold sensitivity. They should call the office if they experience any of these side effects.

## 2023-10-10 NOTE — PROCEDURE: MIPS QUALITY
Quality 485: Psoriasis - Improvement In Patient-Reported Itch Severity: Itch severity assessment score was not reduced by at least 2 points from the initial (index) score to the follow-up visit score or assessment was not completed during the follow-up encounter
Quality 226: Preventive Care And Screening: Tobacco Use: Screening And Cessation Intervention: Patient screened for tobacco use and is an ex/non-smoker
Detail Level: Zone
Quality 431: Preventive Care And Screening: Unhealthy Alcohol Use - Screening: Patient not identified as an unhealthy alcohol user when screened for unhealthy alcohol use using a systematic screening method

## 2023-10-10 NOTE — PROCEDURE: PRESCRIPTION MEDICATION MANAGEMENT
Initiate Treatment: Triamcinolone\\nPrednisone
Render In Strict Bullet Format?: No
Detail Level: Zone

## 2023-10-11 DIAGNOSIS — I10 ESSENTIAL HYPERTENSION: ICD-10-CM

## 2023-10-11 RX ORDER — METOPROLOL SUCCINATE 50 MG/1
50 TABLET, EXTENDED RELEASE ORAL DAILY
Qty: 30 TABLET | Refills: 2 | Status: SHIPPED | OUTPATIENT
Start: 2023-10-11

## 2023-10-11 NOTE — TELEPHONE ENCOUNTER
Shante Smith 10/11/2023 11:27 AM EDT    No problem. Thank you for referring me to [facility]. They had a cancelation today so they had me go in at 2pm today. A PA - saw me, but wasn't sure of the cause of my rash, so did a biopsy, so hopefully they will be able tell what's going on from that. She wants to put me on stronger Prednesone pills and cream. But she was concerned that my blood pressure would go too high, and wants  [facility] [place] or you to advise her concerning a temporary change in my metoprolol dosage to keep my BP from going up. Please advise.

## 2023-10-13 ENCOUNTER — RX ONLY (OUTPATIENT)
Age: 68
Setting detail: RX ONLY
End: 2023-10-13

## 2023-10-13 RX ORDER — TACROLIMUS 1 MG/G
OINTMENT TOPICAL
Qty: 30 | Refills: 3 | Status: ERX | COMMUNITY
Start: 2023-10-13

## 2023-10-17 ENCOUNTER — OFFICE VISIT (OUTPATIENT)
Dept: ORTHOPEDIC SURGERY | Age: 68
End: 2023-10-17
Payer: MEDICARE

## 2023-10-17 VITALS — WEIGHT: 155 LBS | HEIGHT: 65 IN | BODY MASS INDEX: 25.83 KG/M2

## 2023-10-17 DIAGNOSIS — M54.16 LUMBAR RADICULOPATHY: Primary | ICD-10-CM

## 2023-10-17 PROCEDURE — G8427 DOCREV CUR MEDS BY ELIG CLIN: HCPCS | Performed by: PHYSICIAN ASSISTANT

## 2023-10-17 PROCEDURE — G8399 PT W/DXA RESULTS DOCUMENT: HCPCS | Performed by: PHYSICIAN ASSISTANT

## 2023-10-17 PROCEDURE — 1036F TOBACCO NON-USER: CPT | Performed by: PHYSICIAN ASSISTANT

## 2023-10-17 PROCEDURE — 1090F PRES/ABSN URINE INCON ASSESS: CPT | Performed by: PHYSICIAN ASSISTANT

## 2023-10-17 PROCEDURE — G8484 FLU IMMUNIZE NO ADMIN: HCPCS | Performed by: PHYSICIAN ASSISTANT

## 2023-10-17 PROCEDURE — 99213 OFFICE O/P EST LOW 20 MIN: CPT | Performed by: PHYSICIAN ASSISTANT

## 2023-10-17 PROCEDURE — 3017F COLORECTAL CA SCREEN DOC REV: CPT | Performed by: PHYSICIAN ASSISTANT

## 2023-10-17 PROCEDURE — G8417 CALC BMI ABV UP PARAM F/U: HCPCS | Performed by: PHYSICIAN ASSISTANT

## 2023-10-17 PROCEDURE — 1123F ACP DISCUSS/DSCN MKR DOCD: CPT | Performed by: PHYSICIAN ASSISTANT

## 2023-10-17 NOTE — PROGRESS NOTES
10/17/23        Name: Lore Coyne  YOB: 1955  Gender: female  MRN: [id number]    CC: Follow-up       HPI: Lore Coyne is a 77 y.o. female who returns for Follow-up         Patient returns the office today for follow-up after injection. She underwent left L3 and L4 selective nerve root blocks with Dr. Derrick West on 9/26/2023. Unfortunately she had a reaction after the injection. She reports that approximately 4 days after her injection with Dr. Derrick West she noted she was having improvement in her left leg pain which was good, however she also started developing a rash in her left thigh lower leg. Her primary doctor prescribed an oral steroid pack and some topical steroid cream.  She had a recurrence of the rash, and it seemed to get worse. She was referred to dermatology, biopsy was taken and she does not have the results of it yet though. Since stopping the steroids completely for about a week and taking Benadryl, her rash has essentially resolved. History was obtained by patient      Meds/PSH/PMH/FH/SH: This information has been reviewed. ALLERGIES:   Allergies   Allergen Reactions    Latex Anaphylaxis and Rash     The anaphylactic reaction was with latex paint    Lisinopril Anaphylaxis    Ondansetron Hcl Rash     Intense vasodilation with redness, hypotension, responded to epinephrine    Amlodipine Swelling     ankles swell              Physical Examination:            Imaging:         MRI Result (most recent): MRI LUMBAR SPINE WO CONTRAST 09/13/2023    Narrative  Exam: MRI lumbar spine without contrast.  Indication: Spondylolisthesis, lumbar region; Spondylosis without myelopathy or  radiculopathy, lumbar region; Other intervertebral disc degeneration, lumbar  region  Comparison: Lumbar spine radiograph, 9/13/2023.  MRI lumbar spine, 10/19/2015  Contrast: None  Technique: Multiplanar multisequence imaging of the lumbar spine was performed  without

## 2023-11-13 NOTE — TELEPHONE ENCOUNTER
From: Lore Coyne  To: Danielle Frankel  Sent: 9/19/2023 2:33 PM EDT  Subject: any cancelations yet? Mig[address],  I asked the nurse that scheduled my appointment for the spine injection, if she could let me know if there are any cancelations, so I can get my injections scheduled sooner. Could you check on this for me? I have been walking, doing stretches and doing floor exercises to help my back & left leg, but my left knee has been getting weaker & weaker. I fell on Saturday morning, the 16th, because my left knee collapsed on me when I bent over to  our cat. Also, my left leg has started going numb in places. I am sleeping a little better because of the meds you prescribed, but I am still only getting around 4 hours of restless sleep at night because of the throbbing in my leg. Please let me know if there is any possibility of getting the injection sooner. Thanks.  Moira Higgins Posterior Auricular Interpolation Flap Text: A decision was made to reconstruct the defect utilizing an interpolation axial flap and a staged reconstruction.  A telfa template was made of the defect.  This telfa template was then used to outline the posterior auricular interpolation flap.  The donor area for the pedicle flap was then injected with anesthesia.  The flap was excised through the skin and subcutaneous tissue down to the layer of the underlying musculature.  The pedicle flap was carefully excised within this deep plane to maintain its blood supply.  The edges of the donor site were undermined.   The donor site was closed in a primary fashion.  The pedicle was then rotated into position and sutured.  Once the tube was sutured into place, adequate blood supply was confirmed with blanching and refill.  The pedicle was then wrapped with xeroform gauze and dressed appropriately with a telfa and gauze bandage to ensure continued blood supply and protect the attached pedicle.

## 2023-11-15 ENCOUNTER — OFFICE VISIT (OUTPATIENT)
Dept: FAMILY MEDICINE CLINIC | Facility: CLINIC | Age: 68
End: 2023-11-15
Payer: MEDICARE

## 2023-11-15 VITALS
SYSTOLIC BLOOD PRESSURE: 134 MMHG | WEIGHT: 158 LBS | HEIGHT: 64 IN | HEART RATE: 74 BPM | BODY MASS INDEX: 26.98 KG/M2 | DIASTOLIC BLOOD PRESSURE: 78 MMHG | TEMPERATURE: 97.7 F | OXYGEN SATURATION: 100 %

## 2023-11-15 DIAGNOSIS — M25.50 MULTIPLE JOINT PAIN: ICD-10-CM

## 2023-11-15 DIAGNOSIS — E78.2 MIXED HYPERLIPIDEMIA: ICD-10-CM

## 2023-11-15 DIAGNOSIS — B35.1 NAIL FUNGUS: ICD-10-CM

## 2023-11-15 DIAGNOSIS — M85.89 OSTEOPENIA OF MULTIPLE SITES: ICD-10-CM

## 2023-11-15 DIAGNOSIS — I10 ESSENTIAL HYPERTENSION: Primary | ICD-10-CM

## 2023-11-15 DIAGNOSIS — K21.00 GASTROESOPHAGEAL REFLUX DISEASE WITH ESOPHAGITIS WITHOUT HEMORRHAGE: ICD-10-CM

## 2023-11-15 PROCEDURE — 1090F PRES/ABSN URINE INCON ASSESS: CPT | Performed by: FAMILY MEDICINE

## 2023-11-15 PROCEDURE — G8399 PT W/DXA RESULTS DOCUMENT: HCPCS | Performed by: FAMILY MEDICINE

## 2023-11-15 PROCEDURE — G8427 DOCREV CUR MEDS BY ELIG CLIN: HCPCS | Performed by: FAMILY MEDICINE

## 2023-11-15 PROCEDURE — G8484 FLU IMMUNIZE NO ADMIN: HCPCS | Performed by: FAMILY MEDICINE

## 2023-11-15 PROCEDURE — G8417 CALC BMI ABV UP PARAM F/U: HCPCS | Performed by: FAMILY MEDICINE

## 2023-11-15 PROCEDURE — 1123F ACP DISCUSS/DSCN MKR DOCD: CPT | Performed by: FAMILY MEDICINE

## 2023-11-15 PROCEDURE — 3078F DIAST BP <80 MM HG: CPT | Performed by: FAMILY MEDICINE

## 2023-11-15 PROCEDURE — 3017F COLORECTAL CA SCREEN DOC REV: CPT | Performed by: FAMILY MEDICINE

## 2023-11-15 PROCEDURE — 1036F TOBACCO NON-USER: CPT | Performed by: FAMILY MEDICINE

## 2023-11-15 PROCEDURE — 99214 OFFICE O/P EST MOD 30 MIN: CPT | Performed by: FAMILY MEDICINE

## 2023-11-15 PROCEDURE — 3074F SYST BP LT 130 MM HG: CPT | Performed by: FAMILY MEDICINE

## 2023-11-15 RX ORDER — OMEPRAZOLE 20 MG/1
CAPSULE, DELAYED RELEASE ORAL
Qty: 90 CAPSULE | Refills: 2 | Status: SHIPPED | OUTPATIENT
Start: 2023-11-15

## 2023-11-15 RX ORDER — METOPROLOL SUCCINATE 50 MG/1
50 TABLET, EXTENDED RELEASE ORAL DAILY
Qty: 90 TABLET | Refills: 2 | Status: SHIPPED | OUTPATIENT
Start: 2023-11-15

## 2023-11-15 RX ORDER — ATORVASTATIN CALCIUM 10 MG/1
TABLET, FILM COATED ORAL
Qty: 90 TABLET | Refills: 2 | Status: SHIPPED | OUTPATIENT
Start: 2023-11-15

## 2023-11-15 RX ORDER — TERBINAFINE HYDROCHLORIDE 250 MG/1
250 TABLET ORAL DAILY
Qty: 84 TABLET | Refills: 0 | Status: SHIPPED | OUTPATIENT
Start: 2023-11-15 | End: 2024-02-07

## 2023-11-15 ASSESSMENT — ENCOUNTER SYMPTOMS
WHEEZING: 0
CONSTIPATION: 0
NAUSEA: 0
CHEST TIGHTNESS: 0
DIARRHEA: 0
SHORTNESS OF BREATH: 0

## 2023-11-15 ASSESSMENT — PATIENT HEALTH QUESTIONNAIRE - PHQ9
SUM OF ALL RESPONSES TO PHQ QUESTIONS 1-9: 0
SUM OF ALL RESPONSES TO PHQ QUESTIONS 1-9: 0
2. FEELING DOWN, DEPRESSED OR HOPELESS: 0
1. LITTLE INTEREST OR PLEASURE IN DOING THINGS: 0
SUM OF ALL RESPONSES TO PHQ QUESTIONS 1-9: 0
SUM OF ALL RESPONSES TO PHQ QUESTIONS 1-9: 0
SUM OF ALL RESPONSES TO PHQ9 QUESTIONS 1 & 2: 0

## 2023-11-15 NOTE — PROGRESS NOTES
Bob Stovall is a 77 y.o. female who presents today for the following:  Chief Complaint   Patient presents with    Hypertension     2 month follow up        Allergies   Allergen Reactions    Latex Anaphylaxis and Rash     The anaphylactic reaction was with latex paint    Lisinopril Anaphylaxis    Ondansetron Hcl Rash     Intense vasodilation with redness, hypotension, responded to epinephrine    Amlodipine Swelling     ankles swell    Kenalog [Triamcinolone Acetonide] Rash     after topical and lumbar steroid injection. Current Outpatient Medications   Medication Sig Dispense Refill    metoprolol succinate (TOPROL XL) 50 MG extended release tablet Take 1 tablet by mouth daily 30 tablet 2    ketoconazole (NIZORAL) 2 % cream Apply topically daily to affected area 60 g 1    meloxicam (MOBIC) 15 MG tablet Take 1 tablet by mouth daily (Patient not taking: Reported on 10/9/2023) 30 tablet 2    gabapentin (NEURONTIN) 300 MG capsule Take 1 po BID for neuropathic pain as tolerated (Patient not taking: Reported on 10/9/2023) 60 capsule 2    loratadine (CLARITIN) 10 MG tablet Take 1 tablet by mouth daily      CALCIUM CARBONATE-VITAMIN D PO Take by mouth      alendronate (FOSAMAX) 70 MG tablet Take 1 tablet by mouth every 7 days 12 tablet 1    omeprazole (PRILOSEC) 20 MG delayed release capsule Take 1 capsule by mouth once daily 90 capsule 2    atorvastatin (LIPITOR) 10 MG tablet Take 1 tablet by mouth nightly 90 tablet 2    ascorbic acid (VITAMIN C) 500 MG tablet Take by mouth      aspirin 81 MG EC tablet Take 1 tablet by mouth daily      diclofenac sodium (VOLTAREN) 1 % GEL Apply 2 g topically 4 times daily as needed      phytonadione (VITAMIN K) 5 MG tablet Take by mouth       No current facility-administered medications for this visit.        Past Medical History:   Diagnosis Date    Adverse effect of anesthesia     BP drops during surgery, no problem 2021    Allergic rhinitis 12/12/2018    Arthritis     DDD

## 2023-11-16 ENCOUNTER — APPOINTMENT (RX ONLY)
Dept: URBAN - METROPOLITAN AREA CLINIC 329 | Facility: CLINIC | Age: 68
Setting detail: DERMATOLOGY
End: 2023-11-16

## 2023-11-16 DIAGNOSIS — L27.0 GENERALIZED SKIN ERUPTION DUE TO DRUGS AND MEDICAMENTS TAKEN INTERNALLY: ICD-10-CM | Status: RESOLVING

## 2023-11-16 DIAGNOSIS — Z71.89 OTHER SPECIFIED COUNSELING: ICD-10-CM

## 2023-11-16 PROCEDURE — ? ADDITIONAL NOTES

## 2023-11-16 PROCEDURE — ? COUNSELING

## 2023-11-16 PROCEDURE — 99213 OFFICE O/P EST LOW 20 MIN: CPT

## 2023-11-16 PROCEDURE — ? OTHER

## 2023-11-16 PROCEDURE — ? FULL BODY SKIN EXAM - DECLINED

## 2023-11-16 PROCEDURE — ? SUNSCREEN RECOMMENDATIONS

## 2023-11-16 ASSESSMENT — LOCATION ZONE DERM: LOCATION ZONE: LEG

## 2023-11-16 ASSESSMENT — LOCATION DETAILED DESCRIPTION DERM
LOCATION DETAILED: LEFT ANTERIOR DISTAL THIGH
LOCATION DETAILED: RIGHT ANTERIOR PROXIMAL THIGH
LOCATION DETAILED: LEFT ANTERIOR PROXIMAL THIGH
LOCATION DETAILED: RIGHT ANTERIOR DISTAL THIGH

## 2023-11-16 ASSESSMENT — LOCATION SIMPLE DESCRIPTION DERM
LOCATION SIMPLE: RIGHT THIGH
LOCATION SIMPLE: LEFT THIGH

## 2023-11-16 ASSESSMENT — ENCOUNTER SYMPTOMS: BACK PAIN: 1

## 2023-11-16 NOTE — ASSESSMENT & PLAN NOTE
Blood pressure is at goal for age during office visit. Sometimes has elevated readings at home. Continue to keep log. Consider addition of diuretic if needed. In past had reactions to ACE and amlodipine. Encouraged continued medication compliance, DASH or Mediterranean diet and daily physical activity.

## 2023-11-16 NOTE — ASSESSMENT & PLAN NOTE
DEXA BONE DENSITY AXIAL SKELETON 09/19/2022  Impression  Using the World Health Organization criteria, the bone mineral density is low. Total mean density of the hips has decreased by -1.4% since the prior study. Average density of the measured lumbar vertebrae has decreased by -0.2% since  the prior study. 10 year probability of major osteoporotic fracture:  18.6%  10 year probability of hip fracture:  3.3%    -Patient started on fosamax 2023. No longer tolerating medication.   Referral to rheumatology to discuss other treatment options.   -last vitamin D was 34

## 2023-11-16 NOTE — PROCEDURE: OTHER
Detail Level: Zone
Render Risk Assessment In Note?: no
Other (Free Text): Believes it could have been related to prednisone or spinal injections. Will notify us after sending allergist for prednisone allergy
Note Text (......Xxx Chief Complaint.): This diagnosis correlates with the

## 2023-11-16 NOTE — PROCEDURE: ADDITIONAL NOTES
Detail Level: Simple
Render Risk Assessment In Note?: no
Additional Notes: Patient has up coming appointment scheduled with allergist \\nDiscussed possible medications that could be causing drugs eruption. \\nPatient will check for allergy to prednisone and Triamcinalone

## 2024-01-11 ENCOUNTER — OFFICE VISIT (OUTPATIENT)
Dept: RHEUMATOLOGY | Age: 69
End: 2024-01-11
Payer: MEDICARE

## 2024-01-11 VITALS
HEIGHT: 64 IN | SYSTOLIC BLOOD PRESSURE: 138 MMHG | WEIGHT: 159 LBS | DIASTOLIC BLOOD PRESSURE: 94 MMHG | RESPIRATION RATE: 18 BRPM | HEART RATE: 76 BPM | BODY MASS INDEX: 27.14 KG/M2

## 2024-01-11 DIAGNOSIS — M81.0 POSTMENOPAUSAL OSTEOPOROSIS: ICD-10-CM

## 2024-01-11 DIAGNOSIS — M81.0 POSTMENOPAUSAL OSTEOPOROSIS: Primary | ICD-10-CM

## 2024-01-11 LAB
25(OH)D3 SERPL-MCNC: 46.4 NG/ML (ref 30–100)
CALCIUM SERPL-MCNC: 9.2 MG/DL (ref 8.3–10.4)
CRP SERPL-MCNC: <0.3 MG/DL (ref 0–0.9)
MAGNESIUM SERPL-MCNC: 2.8 MG/DL (ref 1.8–2.4)
PHOSPHATE SERPL-MCNC: 3.6 MG/DL (ref 2.3–3.7)
PTH-INTACT SERPL-MCNC: 35.4 PG/ML (ref 18.5–88)

## 2024-01-11 PROCEDURE — 1036F TOBACCO NON-USER: CPT | Performed by: INTERNAL MEDICINE

## 2024-01-11 PROCEDURE — G8428 CUR MEDS NOT DOCUMENT: HCPCS | Performed by: INTERNAL MEDICINE

## 2024-01-11 PROCEDURE — G8484 FLU IMMUNIZE NO ADMIN: HCPCS | Performed by: INTERNAL MEDICINE

## 2024-01-11 PROCEDURE — 1123F ACP DISCUSS/DSCN MKR DOCD: CPT | Performed by: INTERNAL MEDICINE

## 2024-01-11 PROCEDURE — 3017F COLORECTAL CA SCREEN DOC REV: CPT | Performed by: INTERNAL MEDICINE

## 2024-01-11 PROCEDURE — G8399 PT W/DXA RESULTS DOCUMENT: HCPCS | Performed by: INTERNAL MEDICINE

## 2024-01-11 PROCEDURE — 3075F SYST BP GE 130 - 139MM HG: CPT | Performed by: INTERNAL MEDICINE

## 2024-01-11 PROCEDURE — G8417 CALC BMI ABV UP PARAM F/U: HCPCS | Performed by: INTERNAL MEDICINE

## 2024-01-11 PROCEDURE — 3080F DIAST BP >= 90 MM HG: CPT | Performed by: INTERNAL MEDICINE

## 2024-01-11 PROCEDURE — 1090F PRES/ABSN URINE INCON ASSESS: CPT | Performed by: INTERNAL MEDICINE

## 2024-01-11 PROCEDURE — 99204 OFFICE O/P NEW MOD 45 MIN: CPT | Performed by: INTERNAL MEDICINE

## 2024-01-11 RX ORDER — M-VIT,TX,IRON,MINS/CALC/FOLIC 27MG-0.4MG
1 TABLET ORAL DAILY
COMMUNITY

## 2024-01-11 RX ORDER — ALENDRONATE SODIUM 70 MG/1
70 TABLET ORAL
COMMUNITY

## 2024-01-11 RX ORDER — MULTIVIT-MIN/IRON/FOLIC ACID/K 18-600-40
CAPSULE ORAL
COMMUNITY

## 2024-01-11 NOTE — PROGRESS NOTES
metoprolol succinate (TOPROL XL) 50 MG extended release tablet Take 1 tablet by mouth daily 90 tablet 2    terbinafine (LAMISIL) 250 MG tablet Take 1 tablet by mouth daily 84 tablet 0    diclofenac sodium (VOLTAREN) 1 % GEL Apply 2 g topically 4 times daily as needed for Pain 100 g 3    atorvastatin (LIPITOR) 10 MG tablet Take 1 tablet by mouth nightly 90 tablet 2    omeprazole (PRILOSEC) 20 MG delayed release capsule Take 1 capsule by mouth once daily 90 capsule 2    ketoconazole (NIZORAL) 2 % cream Apply topically daily to affected area 60 g 1    meloxicam (MOBIC) 15 MG tablet Take 1 tablet by mouth daily (Patient not taking: Reported on 10/9/2023) 30 tablet 2    gabapentin (NEURONTIN) 300 MG capsule Take 1 po BID for neuropathic pain as tolerated (Patient not taking: Reported on 10/9/2023) 60 capsule 2    loratadine (CLARITIN) 10 MG tablet Take 1 tablet by mouth daily      CALCIUM CARBONATE-VITAMIN D PO Take by mouth      ascorbic acid (VITAMIN C) 500 MG tablet Take by mouth      aspirin 81 MG EC tablet Take 1 tablet by mouth daily      phytonadione (VITAMIN K) 5 MG tablet Take by mouth       No current facility-administered medications for this visit.     Allergies   Allergen Reactions    Latex Anaphylaxis and Rash     The anaphylactic reaction was with latex paint    Lisinopril Anaphylaxis    Ondansetron Hcl Rash     Intense vasodilation with redness, hypotension, responded to epinephrine    Amlodipine Swelling     ankles swell    Kenalog [Triamcinolone Acetonide] Rash     after topical and lumbar steroid injection.     Social History     Tobacco Use    Smoking status: Never    Smokeless tobacco: Never   Substance Use Topics    Alcohol use: No    Drug use: No     [phone] (home)   Past Surgical History:   Procedure Laterality Date    BLADDER SUSPENSION      bladder tack    BREAST BIOPSY      x5    BREAST RECONSTRUCTION      x2    COLONOSCOPY  2019    wnl repeat 5 yrs    COLONOSCOPY  01/28/2013 1-

## 2024-01-11 NOTE — PATIENT INSTRUCTIONS
calcium and 600 IU of vitamin D each day. Adults 71 and older need 1,200 mg of calcium and 800 IU of vitamin D each day. It's not clear if people who already have osteoporosis need more calcium and vitamin D than this. Talk to your doctor about what's right for you.  Eat foods rich in calcium, like yogurt, cheese, milk, and dark green vegetables. This is a good way to get the calcium you need. You can get vitamin D from eggs, fatty fish, cereal, and milk.  Ask your doctor if you need to take a calcium plus vitamin D supplement. You may be able to get enough calcium and vitamin D through your diet. Be careful with supplements. Adults ages 19 to 50 should not get more than 2,500 mg of calcium and 4,000 IU of vitamin D each day, whether it is from supplements and/or food. Adults ages 51 and older should not get more than 2,000 mg of calcium and 4,000 IU of vitamin D each day from supplements and/or food.  Limit alcohol to 2 drinks a day for men and 1 drink a day for women. Too much alcohol can cause health problems.  Do not smoke. Smoking puts you at a much higher risk for osteoporosis. If you need help quitting, talk to your doctor about stop-smoking programs and medicines. These can increase your chances of quitting for good.  Get regular bone-building exercise. Weight-bearing and resistance exercises keep bones healthy by working the muscles and bones against gravity. Start out at an exercise level that feels right for you. Add a little at a time until you can do the following:  Do 30 minutes of weight-bearing exercise on most days of the week. Walking, jogging, stair climbing, and dancing are good choices.  Do resistance exercises with weights or elastic bands 2 to 3 days a week.  Reduce your risk of falls:  Wear supportive shoes with low heels and nonslip soles.  Use a cane or walker, if you need it. Use shower chairs and bath benches. Put in handrails on stairways, around your shower or tub area, and near the

## 2024-01-12 ENCOUNTER — TELEPHONE (OUTPATIENT)
Dept: RHEUMATOLOGY | Age: 69
End: 2024-01-12

## 2024-01-12 LAB — ERYTHROCYTE [SEDIMENTATION RATE] IN BLOOD: 4 MM/HR (ref 0–30)

## 2024-01-12 NOTE — TELEPHONE ENCOUNTER
Prolia ordered by Dr. Espinoza. Insured with [facility] and [employer] Supp. Requested benefits on [employer] site.

## 2024-01-12 NOTE — TELEPHONE ENCOUNTER
----- Message from Lizett Haider MA sent at 1/12/2024  8:47 AM EST -----  Regarding: mirna Espinoza, MD Vitaly Duque, Lizett MANJARREZ MA  Tried alendronate but had GI distress.  She needs to start on denosumab

## 2024-01-15 LAB
COLLAGEN CTX SERPL-MCNC: 79 PG/ML
PINP SER-MCNC: 18.4 NG/ML (ref 10.4–97.8)

## 2024-01-17 NOTE — TELEPHONE ENCOUNTER
BIF for Prolia received from [employer]. MCR and [employer] supp plan G- no PA needed- supp plan covers MCR coinsurance but not MCR ded. ($0 met of $240 ded).   PHONE CALL to patient to schedule the injection appt.   She has had some allergy tests, so may have met some of her [facility] ded. Can bill her for the remaining.   [place]. Back home middle of August. Scheduled her for 2/7/24 so her 2nd injection would not be too far off track.   Labs reviewed. Pt advised to continue with calcium and vitamin D supplements.   Lab Results   Component Value Date     (H) 09/07/2023    K 4.0 09/07/2023     09/07/2023    CO2 30 09/07/2023    BUN 13 09/07/2023    CREATININE 1.00 09/07/2023    GLUCOSE 94 09/07/2023    CALCIUM 9.2 01/11/2024    PROT 7.2 09/07/2023    LABALBU 4.1 09/07/2023    BILITOT 0.6 09/07/2023    ALKPHOS 84 09/07/2023    AST 31 09/07/2023    ALT 34 09/07/2023    LABGLOM >60 09/07/2023    GFRAA 78 02/04/2021    AGRATIO 2.1 03/08/2022    GLOB 3.1 09/07/2023       Lab Results   Component Value Date/Time    VITD25 46.4 01/11/2024 03:33 PM

## 2024-01-23 DIAGNOSIS — M81.0 POSTMENOPAUSAL OSTEOPOROSIS: Primary | ICD-10-CM

## 2024-02-07 ENCOUNTER — NURSE ONLY (OUTPATIENT)
Dept: RHEUMATOLOGY | Age: 69
End: 2024-02-07
Payer: MEDICARE

## 2024-02-07 VITALS — HEART RATE: 69 BPM | DIASTOLIC BLOOD PRESSURE: 80 MMHG | TEMPERATURE: 98.1 F | SYSTOLIC BLOOD PRESSURE: 136 MMHG

## 2024-02-07 DIAGNOSIS — M81.0 POSTMENOPAUSAL OSTEOPOROSIS: Primary | ICD-10-CM

## 2024-02-07 PROCEDURE — 96372 THER/PROPH/DIAG INJ SC/IM: CPT | Performed by: INTERNAL MEDICINE

## 2024-02-07 NOTE — PROGRESS NOTES
[facility]  [address] [place] [address], [address]  [place] [address]  Office : [phone], Fax: [phone]       # 1 Prolia 60mg/ml injected SC today into right upper arm. Patient tolerated injection well. Advised patient to continue with calcium and vitamin D post injection. Advised patient to call with any problems post injection.   Medication ordered by Dr. Espinoza.     Pre-infusion/injection questionairre for osteoporosis    1. Have you had or are you planning any dental work?  NO    2. Are you taking your calcium and vitamin D? YES    3. When was your last osteoporosis treatment? 2/7/24 - 1st today    4. Have you had any recent fractures? NO

## 2024-03-06 ENCOUNTER — HOSPITAL ENCOUNTER (OUTPATIENT)
Dept: CT IMAGING | Age: 69
Discharge: HOME | End: 2024-03-09
Attending: INTERNAL MEDICINE

## 2024-03-06 DIAGNOSIS — Z85.3 PERSONAL HISTORY OF MALIGNANT NEOPLASM OF BREAST: ICD-10-CM

## 2024-03-06 DIAGNOSIS — R91.8 MULTIPLE PULMONARY NODULES: ICD-10-CM

## 2024-03-10 SDOH — ECONOMIC STABILITY: INCOME INSECURITY: HOW HARD IS IT FOR YOU TO PAY FOR THE VERY BASICS LIKE FOOD, HOUSING, MEDICAL CARE, AND HEATING?: NOT HARD AT ALL

## 2024-03-10 SDOH — HEALTH STABILITY: PHYSICAL HEALTH: ON AVERAGE, HOW MANY DAYS PER WEEK DO YOU ENGAGE IN MODERATE TO STRENUOUS EXERCISE (LIKE A BRISK WALK)?: 4 DAYS

## 2024-03-10 SDOH — HEALTH STABILITY: PHYSICAL HEALTH: ON AVERAGE, HOW MANY MINUTES DO YOU ENGAGE IN EXERCISE AT THIS LEVEL?: 40 MIN

## 2024-03-10 SDOH — ECONOMIC STABILITY: FOOD INSECURITY: WITHIN THE PAST 12 MONTHS, THE FOOD YOU BOUGHT JUST DIDN'T LAST AND YOU DIDN'T HAVE MONEY TO GET MORE.: NEVER TRUE

## 2024-03-10 SDOH — ECONOMIC STABILITY: FOOD INSECURITY: WITHIN THE PAST 12 MONTHS, YOU WORRIED THAT YOUR FOOD WOULD RUN OUT BEFORE YOU GOT MONEY TO BUY MORE.: NEVER TRUE

## 2024-03-10 ASSESSMENT — PATIENT HEALTH QUESTIONNAIRE - PHQ9
SUM OF ALL RESPONSES TO PHQ QUESTIONS 1-9: 0
SUM OF ALL RESPONSES TO PHQ9 QUESTIONS 1 & 2: 0
SUM OF ALL RESPONSES TO PHQ QUESTIONS 1-9: 0
2. FEELING DOWN, DEPRESSED OR HOPELESS: 0
1. LITTLE INTEREST OR PLEASURE IN DOING THINGS: 0
SUM OF ALL RESPONSES TO PHQ QUESTIONS 1-9: 0
SUM OF ALL RESPONSES TO PHQ QUESTIONS 1-9: 0

## 2024-03-10 ASSESSMENT — LIFESTYLE VARIABLES
HOW MANY STANDARD DRINKS CONTAINING ALCOHOL DO YOU HAVE ON A TYPICAL DAY: 0
HOW OFTEN DO YOU HAVE SIX OR MORE DRINKS ON ONE OCCASION: 1
HOW OFTEN DO YOU HAVE A DRINK CONTAINING ALCOHOL: 1
HOW MANY STANDARD DRINKS CONTAINING ALCOHOL DO YOU HAVE ON A TYPICAL DAY: PATIENT DOES NOT DRINK
HOW OFTEN DO YOU HAVE A DRINK CONTAINING ALCOHOL: NEVER

## 2024-03-11 ENCOUNTER — OFFICE VISIT (OUTPATIENT)
Dept: FAMILY MEDICINE CLINIC | Facility: CLINIC | Age: 69
End: 2024-03-11
Payer: MEDICARE

## 2024-03-11 VITALS
BODY MASS INDEX: 25.49 KG/M2 | WEIGHT: 153 LBS | HEIGHT: 65 IN | HEART RATE: 72 BPM | OXYGEN SATURATION: 99 % | SYSTOLIC BLOOD PRESSURE: 136 MMHG | DIASTOLIC BLOOD PRESSURE: 87 MMHG

## 2024-03-11 DIAGNOSIS — I10 ESSENTIAL HYPERTENSION: ICD-10-CM

## 2024-03-11 DIAGNOSIS — Z00.00 MEDICARE ANNUAL WELLNESS VISIT, SUBSEQUENT: Primary | ICD-10-CM

## 2024-03-11 DIAGNOSIS — E78.2 MIXED HYPERLIPIDEMIA: ICD-10-CM

## 2024-03-11 DIAGNOSIS — C50.919 MALIGNANT NEOPLASM OF FEMALE BREAST, UNSPECIFIED ESTROGEN RECEPTOR STATUS, UNSPECIFIED LATERALITY, UNSPECIFIED SITE OF BREAST (HCC): ICD-10-CM

## 2024-03-11 DIAGNOSIS — R51.9 NEW ONSET HEADACHE: ICD-10-CM

## 2024-03-11 DIAGNOSIS — K21.00 GASTROESOPHAGEAL REFLUX DISEASE WITH ESOPHAGITIS WITHOUT HEMORRHAGE: ICD-10-CM

## 2024-03-11 DIAGNOSIS — R05.3 CHRONIC COUGH: ICD-10-CM

## 2024-03-11 PROBLEM — K44.9 HIATAL HERNIA: Status: ACTIVE | Noted: 2024-03-11

## 2024-03-11 LAB
ALBUMIN SERPL-MCNC: 4.3 G/DL (ref 3.2–4.6)
ALBUMIN/GLOB SERPL: 1.3 (ref 0.4–1.6)
ALP SERPL-CCNC: 68 U/L (ref 50–136)
ALT SERPL-CCNC: 34 U/L (ref 12–65)
ANION GAP SERPL CALC-SCNC: 3 MMOL/L (ref 2–11)
AST SERPL-CCNC: 33 U/L (ref 15–37)
BASOPHILS # BLD: 0 K/UL (ref 0–0.2)
BASOPHILS NFR BLD: 1 % (ref 0–2)
BILIRUB SERPL-MCNC: 0.6 MG/DL (ref 0.2–1.1)
BUN SERPL-MCNC: 12 MG/DL (ref 8–23)
CALCIUM SERPL-MCNC: 9.2 MG/DL (ref 8.3–10.4)
CHLORIDE SERPL-SCNC: 107 MMOL/L (ref 103–113)
CHOLEST SERPL-MCNC: 153 MG/DL
CO2 SERPL-SCNC: 31 MMOL/L (ref 21–32)
CREAT SERPL-MCNC: 1 MG/DL (ref 0.6–1)
DIFFERENTIAL METHOD BLD: NORMAL
EOSINOPHIL # BLD: 0.1 K/UL (ref 0–0.8)
EOSINOPHIL NFR BLD: 1 % (ref 0.5–7.8)
ERYTHROCYTE [DISTWIDTH] IN BLOOD BY AUTOMATED COUNT: 12.7 % (ref 11.9–14.6)
GLOBULIN SER CALC-MCNC: 3.3 G/DL (ref 2.8–4.5)
GLUCOSE SERPL-MCNC: 92 MG/DL (ref 65–100)
HCT VFR BLD AUTO: 43.8 % (ref 35.8–46.3)
HDLC SERPL-MCNC: 71 MG/DL (ref 40–60)
HDLC SERPL: 2.2
HGB BLD-MCNC: 14.1 G/DL (ref 11.7–15.4)
IMM GRANULOCYTES # BLD AUTO: 0 K/UL (ref 0–0.5)
IMM GRANULOCYTES NFR BLD AUTO: 0 % (ref 0–5)
LDLC SERPL CALC-MCNC: 60.4 MG/DL
LYMPHOCYTES # BLD: 1.3 K/UL (ref 0.5–4.6)
LYMPHOCYTES NFR BLD: 26 % (ref 13–44)
MCH RBC QN AUTO: 30.2 PG (ref 26.1–32.9)
MCHC RBC AUTO-ENTMCNC: 32.2 G/DL (ref 31.4–35)
MCV RBC AUTO: 93.8 FL (ref 82–102)
MONOCYTES # BLD: 0.2 K/UL (ref 0.1–1.3)
MONOCYTES NFR BLD: 5 % (ref 4–12)
NEUTS SEG # BLD: 3.4 K/UL (ref 1.7–8.2)
NEUTS SEG NFR BLD: 67 % (ref 43–78)
NRBC # BLD: 0 K/UL (ref 0–0.2)
PLATELET # BLD AUTO: 198 K/UL (ref 150–450)
PMV BLD AUTO: 10.2 FL (ref 9.4–12.3)
POTASSIUM SERPL-SCNC: 3.8 MMOL/L (ref 3.5–5.1)
PROT SERPL-MCNC: 7.6 G/DL (ref 6.3–8.2)
RBC # BLD AUTO: 4.67 M/UL (ref 4.05–5.2)
SODIUM SERPL-SCNC: 141 MMOL/L (ref 136–146)
TRIGL SERPL-MCNC: 108 MG/DL (ref 35–150)
TSH W FREE THYROID IF ABNORMAL: 1.98 UIU/ML (ref 0.36–3.74)
VLDLC SERPL CALC-MCNC: 21.6 MG/DL (ref 6–23)
WBC # BLD AUTO: 5 K/UL (ref 4.3–11.1)

## 2024-03-11 PROCEDURE — 99214 OFFICE O/P EST MOD 30 MIN: CPT | Performed by: FAMILY MEDICINE

## 2024-03-11 PROCEDURE — 1090F PRES/ABSN URINE INCON ASSESS: CPT | Performed by: FAMILY MEDICINE

## 2024-03-11 PROCEDURE — 1123F ACP DISCUSS/DSCN MKR DOCD: CPT | Performed by: FAMILY MEDICINE

## 2024-03-11 PROCEDURE — G0439 PPPS, SUBSEQ VISIT: HCPCS | Performed by: FAMILY MEDICINE

## 2024-03-11 PROCEDURE — G8417 CALC BMI ABV UP PARAM F/U: HCPCS | Performed by: FAMILY MEDICINE

## 2024-03-11 PROCEDURE — 3017F COLORECTAL CA SCREEN DOC REV: CPT | Performed by: FAMILY MEDICINE

## 2024-03-11 PROCEDURE — 1036F TOBACCO NON-USER: CPT | Performed by: FAMILY MEDICINE

## 2024-03-11 PROCEDURE — 3075F SYST BP GE 130 - 139MM HG: CPT | Performed by: FAMILY MEDICINE

## 2024-03-11 PROCEDURE — G8427 DOCREV CUR MEDS BY ELIG CLIN: HCPCS | Performed by: FAMILY MEDICINE

## 2024-03-11 PROCEDURE — G8482 FLU IMMUNIZE ORDER/ADMIN: HCPCS | Performed by: FAMILY MEDICINE

## 2024-03-11 PROCEDURE — G8399 PT W/DXA RESULTS DOCUMENT: HCPCS | Performed by: FAMILY MEDICINE

## 2024-03-11 PROCEDURE — 3079F DIAST BP 80-89 MM HG: CPT | Performed by: FAMILY MEDICINE

## 2024-03-11 RX ORDER — ATORVASTATIN CALCIUM 10 MG/1
TABLET, FILM COATED ORAL
Qty: 90 TABLET | Refills: 2 | Status: SHIPPED | OUTPATIENT
Start: 2024-03-11

## 2024-03-11 RX ORDER — IBUPROFEN 200 MG
CAPSULE ORAL
COMMUNITY

## 2024-03-11 RX ORDER — OMEPRAZOLE 20 MG/1
CAPSULE, DELAYED RELEASE ORAL
Qty: 90 CAPSULE | Refills: 2 | Status: SHIPPED | OUTPATIENT
Start: 2024-03-11

## 2024-03-11 RX ORDER — METOPROLOL SUCCINATE 50 MG/1
50 TABLET, EXTENDED RELEASE ORAL DAILY
Qty: 90 TABLET | Refills: 2 | Status: SHIPPED | OUTPATIENT
Start: 2024-03-11

## 2024-03-11 RX ORDER — DENOSUMAB 60 MG/ML
60 INJECTION SUBCUTANEOUS ONCE
COMMUNITY
Start: 2024-02-07

## 2024-03-11 NOTE — PROGRESS NOTES
(PRILOSEC) 20 MG delayed release capsule; Take 1 capsule by mouth once daily, Disp-90 capsule, R-2Normal  7. New onset headache  Patient with intermittent severe right sided headaches.  Check mri given age and history of cancer.  -     MRI BRAIN W WO CONTRAST; Future             Todd Snow MD

## 2024-03-11 NOTE — PATIENT INSTRUCTIONS
lifestyle, illnesses that may run in your family, and various assessments and screenings as appropriate.    After reviewing your medical record and screening and assessments performed today your provider may have ordered immunizations, labs, imaging, and/or referrals for you.  A list of these orders (if applicable) as well as your Preventive Care list are included within your After Visit Summary for your review.    Other Preventive Recommendations:    A preventive eye exam performed by an eye specialist is recommended every 1-2 years to screen for glaucoma; cataracts, macular degeneration, and other eye disorders.  A preventive dental visit is recommended every 6 months.  Try to get at least 150 minutes of exercise per week or 10,000 steps per day on a pedometer .  Order or download the FREE \"Exercise & Physical Activity: Your Everyday Guide\" from [employer]. Call 1-391.823.3024 or search [employer] online.  You need 3818-6137 mg of calcium and 4818-8187 IU of vitamin D per day. It is possible to meet your calcium requirement with diet alone, but a vitamin D supplement is usually necessary to meet this goal.  When exposed to the sun, use a sunscreen that protects against both UVA and UVB radiation with an SPF of 30 or greater. Reapply every 2 to 3 hours or after sweating, drying off with a towel, or swimming.  Always wear a seat belt when traveling in a car. Always wear a helmet when riding a bicycle or motorcycle.

## 2024-03-12 ENCOUNTER — OFFICE VISIT (OUTPATIENT)
Dept: ONCOLOGY | Age: 69
End: 2024-03-12
Payer: MEDICARE

## 2024-03-12 VITALS
SYSTOLIC BLOOD PRESSURE: 130 MMHG | BODY MASS INDEX: 25.89 KG/M2 | HEIGHT: 65 IN | OXYGEN SATURATION: 99 % | RESPIRATION RATE: 16 BRPM | HEART RATE: 67 BPM | TEMPERATURE: 97.8 F | DIASTOLIC BLOOD PRESSURE: 78 MMHG | WEIGHT: 155.4 LBS

## 2024-03-12 DIAGNOSIS — R91.8 MULTIPLE PULMONARY NODULES: ICD-10-CM

## 2024-03-12 DIAGNOSIS — Z85.3 PERSONAL HISTORY OF MALIGNANT NEOPLASM OF BREAST: Primary | ICD-10-CM

## 2024-03-12 PROCEDURE — 1123F ACP DISCUSS/DSCN MKR DOCD: CPT | Performed by: INTERNAL MEDICINE

## 2024-03-12 PROCEDURE — 3075F SYST BP GE 130 - 139MM HG: CPT | Performed by: INTERNAL MEDICINE

## 2024-03-12 PROCEDURE — 3017F COLORECTAL CA SCREEN DOC REV: CPT | Performed by: INTERNAL MEDICINE

## 2024-03-12 PROCEDURE — G8417 CALC BMI ABV UP PARAM F/U: HCPCS | Performed by: INTERNAL MEDICINE

## 2024-03-12 PROCEDURE — 1090F PRES/ABSN URINE INCON ASSESS: CPT | Performed by: INTERNAL MEDICINE

## 2024-03-12 PROCEDURE — G8482 FLU IMMUNIZE ORDER/ADMIN: HCPCS | Performed by: INTERNAL MEDICINE

## 2024-03-12 PROCEDURE — G8428 CUR MEDS NOT DOCUMENT: HCPCS | Performed by: INTERNAL MEDICINE

## 2024-03-12 PROCEDURE — 1036F TOBACCO NON-USER: CPT | Performed by: INTERNAL MEDICINE

## 2024-03-12 PROCEDURE — 99214 OFFICE O/P EST MOD 30 MIN: CPT | Performed by: INTERNAL MEDICINE

## 2024-03-12 PROCEDURE — 3078F DIAST BP <80 MM HG: CPT | Performed by: INTERNAL MEDICINE

## 2024-03-12 PROCEDURE — G8399 PT W/DXA RESULTS DOCUMENT: HCPCS | Performed by: INTERNAL MEDICINE

## 2024-03-12 ASSESSMENT — ENCOUNTER SYMPTOMS
NAUSEA: 0
WHEEZING: 0
CONSTIPATION: 0
SHORTNESS OF BREATH: 0
BACK PAIN: 1
DIARRHEA: 0
CHEST TIGHTNESS: 0

## 2024-03-12 ASSESSMENT — PATIENT HEALTH QUESTIONNAIRE - PHQ9
1. LITTLE INTEREST OR PLEASURE IN DOING THINGS: 0
SUM OF ALL RESPONSES TO PHQ QUESTIONS 1-9: 0
SUM OF ALL RESPONSES TO PHQ9 QUESTIONS 1 & 2: 0
2. FEELING DOWN, DEPRESSED OR HOPELESS: 0
SUM OF ALL RESPONSES TO PHQ QUESTIONS 1-9: 0

## 2024-03-12 NOTE — PROGRESS NOTES
[facility]: Office Visit Established Patient    Chief Complaint:    Chief Complaint   Patient presents with    Follow-up         History of Present Illness:  Ms. Adair is a 68 y.o. female who returns today for follow-up of resected breast cancer and pulmonary nodules.  She underwent bilateral mastectomy for a T2 breast cancer in 2006, she completed 5 years of endocrine therapy and is currently on surveillance.  She had some recurrent pulmonary infections for which Dr. Tyler had checked immunoglobulins which were normal.     Here for follow-up.  She is doing well. She continues to have some chronic pulmonary infections, we have been following several tiny lung nodules for years as well.  She notes some chronic shortness of breath.  Otherwise doing well clinically.  She is now 18 years out from her breast cancer diagnosis.  She is now established with Dr. Todd Snow for primary care.  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      Review of Systems:  Constitutional: Negative.   HENT: Negative.   Eyes: Negative.   Respiratory: Negative.   Cardiovascular: Negative.   Gastrointestinal: Negative.   Genitourinary: Negative.   Musculoskeletal: Negative.   Skin: Negative.   Neurological: Negative.   Endo/Heme/Allergies: Negative.   Psychiatric/Behavioral: Negative.   All other systems reviewed and are negative.       Allergies   Allergen Reactions    Latex Anaphylaxis and Rash     The anaphylactic reaction was with latex paint    Lisinopril Anaphylaxis    Ondansetron Hcl Rash     Intense vasodilation with redness, hypotension, responded to epinephrine    Amlodipine Swelling     ankles swell    Kenalog [Triamcinolone Acetonide] Rash     after topical and lumbar steroid injection.    Triamcinolone Rash     Past Medical History:   Diagnosis Date    Adverse effect of anesthesia     BP drops during surgery, no problem 2021    Allergic

## 2024-03-12 NOTE — PATIENT INSTRUCTIONS
Abnormal 03/11/2024 1.98  0.358 - 3.740 UIU/ML Final     Treatment Summary has been discussed and given to patient: N/A    -------------------------------------------------------------------------------------------------------------------  Please call our office at [phone] if you have any  of the following symptoms:   Fever of 100.5 or greater  Chills  Shortness of breath  Swelling or pain in one leg    After office hours an answering service is available and will contact a provider for emergencies or if you are experiencing any of the above symptoms.    Patient does express an interest in My Chart.  My Chart log in information explained on the after visit summary printout at the check-out desk.    GUILHERME MCQUEEN RN

## 2024-03-12 NOTE — ASSESSMENT & PLAN NOTE
Symptoms controlled with medication.  Prior hiatal hernia.  Prior esophagitis.  Does report chronic cough at night. Discussed could be related to reflux

## 2024-03-12 NOTE — ASSESSMENT & PLAN NOTE
Blood pressure is at goal on home readings. Reports improvement in blood pressure with supplement.  Continue to keep log.  Consider addition of diuretic if needed.  In past had reactions to ACE and amlodipine.  Encouraged continued medication compliance, DASH or Mediterranean diet and daily physical activity.

## 2024-03-27 ENCOUNTER — HOSPITAL ENCOUNTER (OUTPATIENT)
Dept: MRI IMAGING | Age: 69
Discharge: HOME | End: 2024-03-30
Payer: MEDICARE

## 2024-03-27 DIAGNOSIS — R51.9 NEW ONSET HEADACHE: ICD-10-CM

## 2024-03-27 PROCEDURE — 70553 MRI BRAIN STEM W/O & W/DYE: CPT

## 2024-03-27 PROCEDURE — A9579 GAD-BASE MR CONTRAST NOS,1ML: HCPCS | Performed by: FAMILY MEDICINE

## 2024-03-27 PROCEDURE — 6360000004 HC RX CONTRAST MEDICATION: Performed by: FAMILY MEDICINE

## 2024-03-27 RX ADMIN — GADOTERIDOL 14 ML: 279.3 INJECTION, SOLUTION INTRAVENOUS at 11:17

## 2024-04-05 ENCOUNTER — PATIENT MESSAGE (OUTPATIENT)
Dept: FAMILY MEDICINE CLINIC | Facility: CLINIC | Age: 69
End: 2024-04-05

## 2024-04-05 DIAGNOSIS — J06.9 UPPER RESPIRATORY TRACT INFECTION, UNSPECIFIED TYPE: Primary | ICD-10-CM

## 2024-04-05 RX ORDER — AZITHROMYCIN 250 MG/1
TABLET, FILM COATED ORAL
Qty: 6 TABLET | Refills: 0 | Status: SHIPPED | OUTPATIENT
Start: 2024-04-05 | End: 2024-04-15

## 2024-04-05 NOTE — TELEPHONE ENCOUNTER
From: Leah Adair  To: Dr. Todd Snow  Sent: 4/5/2024 9:29 AM EDT  Subject: Need of a prescription    Sunday I started getting a cold and it has been getting progressively worse each day all week. It has been 6 days since it started. I've been taking Coricidin for cold & flu, Mucinex, zinc supplement, and high doses of Vitamin C every day since this started, but am still getting worse each day.   I have constant running of the nose and sneezing and a cough. It is now producing a yellowish discharge from my nose and from the cough.  This is what usually happens now that I'm older. I can't get seem to get over these things naturally. Could you please prescribe a z-pac for me?? That seems to be the best thing that always stops it after a few days. Thanks. I look forward to hearing back from you.   Leah Adair

## 2024-04-30 ENCOUNTER — OFFICE VISIT (OUTPATIENT)
Dept: FAMILY MEDICINE CLINIC | Facility: CLINIC | Age: 69
End: 2024-04-30
Payer: MEDICARE

## 2024-04-30 VITALS
DIASTOLIC BLOOD PRESSURE: 78 MMHG | TEMPERATURE: 97.4 F | OXYGEN SATURATION: 99 % | HEIGHT: 65 IN | BODY MASS INDEX: 25.59 KG/M2 | HEART RATE: 80 BPM | SYSTOLIC BLOOD PRESSURE: 132 MMHG | WEIGHT: 153.6 LBS

## 2024-04-30 DIAGNOSIS — M54.50 RIGHT LOW BACK PAIN, UNSPECIFIED CHRONICITY, UNSPECIFIED WHETHER SCIATICA PRESENT: ICD-10-CM

## 2024-04-30 DIAGNOSIS — N30.90 CYSTITIS: Primary | ICD-10-CM

## 2024-04-30 DIAGNOSIS — N30.90 CYSTITIS: ICD-10-CM

## 2024-04-30 LAB
BILIRUBIN, URINE, POC: NEGATIVE
BLOOD URINE, POC: NEGATIVE
GLUCOSE URINE, POC: NEGATIVE
KETONES, URINE, POC: NEGATIVE
LEUKOCYTE ESTERASE, URINE, POC: NORMAL
NITRITE, URINE, POC: NEGATIVE
PH, URINE, POC: 7 (ref 4.6–8)
PROTEIN,URINE, POC: NORMAL
SPECIFIC GRAVITY, URINE, POC: 1.01 (ref 1–1.03)
URINALYSIS CLARITY, POC: CLEAR
URINALYSIS COLOR, POC: NORMAL
UROBILINOGEN, POC: NORMAL

## 2024-04-30 PROCEDURE — 3017F COLORECTAL CA SCREEN DOC REV: CPT | Performed by: NURSE PRACTITIONER

## 2024-04-30 PROCEDURE — 99213 OFFICE O/P EST LOW 20 MIN: CPT | Performed by: NURSE PRACTITIONER

## 2024-04-30 PROCEDURE — 1090F PRES/ABSN URINE INCON ASSESS: CPT | Performed by: NURSE PRACTITIONER

## 2024-04-30 PROCEDURE — 81003 URINALYSIS AUTO W/O SCOPE: CPT | Performed by: NURSE PRACTITIONER

## 2024-04-30 PROCEDURE — 3078F DIAST BP <80 MM HG: CPT | Performed by: NURSE PRACTITIONER

## 2024-04-30 PROCEDURE — G8399 PT W/DXA RESULTS DOCUMENT: HCPCS | Performed by: NURSE PRACTITIONER

## 2024-04-30 PROCEDURE — 1123F ACP DISCUSS/DSCN MKR DOCD: CPT | Performed by: NURSE PRACTITIONER

## 2024-04-30 PROCEDURE — G8417 CALC BMI ABV UP PARAM F/U: HCPCS | Performed by: NURSE PRACTITIONER

## 2024-04-30 PROCEDURE — G8427 DOCREV CUR MEDS BY ELIG CLIN: HCPCS | Performed by: NURSE PRACTITIONER

## 2024-04-30 PROCEDURE — 3075F SYST BP GE 130 - 139MM HG: CPT | Performed by: NURSE PRACTITIONER

## 2024-04-30 PROCEDURE — 1036F TOBACCO NON-USER: CPT | Performed by: NURSE PRACTITIONER

## 2024-04-30 RX ORDER — SULFAMETHOXAZOLE AND TRIMETHOPRIM 800; 160 MG/1; MG/1
1 TABLET ORAL 2 TIMES DAILY
Qty: 14 TABLET | Refills: 0 | Status: SHIPPED | OUTPATIENT
Start: 2024-04-30 | End: 2024-05-07

## 2024-04-30 RX ORDER — THIAMINE HCL 100 MG
2500 TABLET ORAL DAILY
COMMUNITY

## 2024-04-30 RX ORDER — SAW/PYGEUM/BETA/HERB/D3/B6/ZN 30 MG-25MG
200 CAPSULE ORAL DAILY
COMMUNITY

## 2024-04-30 ASSESSMENT — ENCOUNTER SYMPTOMS
BACK PAIN: 1
EYES NEGATIVE: 1
RESPIRATORY NEGATIVE: 1
ALLERGIC/IMMUNOLOGIC NEGATIVE: 1

## 2024-04-30 NOTE — PROGRESS NOTES
once daily, Disp: 90 capsule, Rfl: 2    Cranberry 1000 MG CAPS, Take 15,000 mg by mouth daily, Disp: , Rfl:     Garlic 2 MG CAPS, Take 1,000 mg by mouth, Disp: , Rfl:     Multiple Vitamins-Minerals (THERAPEUTIC MULTIVITAMIN-MINERALS) tablet, Take 1 tablet by mouth daily Ca carb 300 vit d 1000, Disp: , Rfl:     Cholecalciferol (VITAMIN D) 50 MCG (2000 UT) CAPS capsule, Take 25 capsules by mouth daily Doing 1000, Disp: , Rfl:     diclofenac sodium (VOLTAREN) 1 % GEL, Apply 2 g topically 4 times daily as needed for Pain, Disp: 100 g, Rfl: 3    ascorbic acid (VITAMIN C) 500 MG tablet, Take 2 tablets by mouth daily, Disp: , Rfl:     aspirin 81 MG EC tablet, Take 1 tablet by mouth daily, Disp: , Rfl:     phytonadione (VITAMIN K) 5 MG tablet, Take 100 tablets by mouth daily 100 mcg daily, Disp: , Rfl:     Review of Systems   Constitutional: Negative.    HENT: Negative.     Eyes: Negative.    Respiratory: Negative.     Cardiovascular: Negative.    Gastrointestinal:  Positive for nausea.   Endocrine: Positive for polyuria.   Musculoskeletal:  Positive for back pain.        Pain extends into right thigh   Skin: Negative.    Allergic/Immunologic: Negative.    Neurological: Negative.    Hematological: Negative.    Psychiatric/Behavioral: Negative.          Vitals:    04/30/24 0957   BP: 132/78   Pulse: 80   Temp: 97.4 °F (36.3 °C)   SpO2: 99%        Physical Exam  Constitutional:       Appearance: Normal appearance.   HENT:      Head: Normocephalic.      Nose: Nose normal.   Eyes:      Extraocular Movements: Extraocular movements intact.      Pupils: Pupils are equal, round, and reactive to light.   Cardiovascular:      Rate and Rhythm: Normal rate and regular rhythm.   Pulmonary:      Effort: Pulmonary effort is normal.      Breath sounds: Normal breath sounds.   Abdominal:      General: Abdomen is flat.      Palpations: Abdomen is soft.   Musculoskeletal:         General: Tenderness (lumbar) present.      Cervical back:

## 2024-05-01 ENCOUNTER — TELEPHONE (OUTPATIENT)
Dept: FAMILY MEDICINE CLINIC | Facility: CLINIC | Age: 69
End: 2024-05-01

## 2024-05-01 ASSESSMENT — ENCOUNTER SYMPTOMS: NAUSEA: 1

## 2024-05-01 NOTE — TELEPHONE ENCOUNTER
Leah called requesting to speak to Pat directly in regards to the visit she had with her yest. Stated she was reviewing her notes from her visit via [employer] and there was some things listed that are needing to be fixed and clarified between in regards to what she has going on.     Stated does not want to speak to no one else besides Pat.    Pt also stated her symptoms are not getting better and is feeling worse. Would like to be called today ASAP ones the provider is available.    Informed the pt the provider is in the middle of seeing pts and would not be able to transfer her directly.

## 2024-05-02 LAB
BACTERIA SPEC CULT: NORMAL
BACTERIA SPEC CULT: NORMAL
SERVICE CMNT-IMP: NORMAL

## 2024-05-02 NOTE — RESULT ENCOUNTER NOTE
Results to patient please.  Urine culture only grew out 10,000-50,000 colonies per mL.  Patient may stop antibiotic  Thanks

## 2024-05-07 NOTE — RESULT ENCOUNTER NOTE
Called and spoke to patient regarding symptoms.  Mostly resolved at this time.  Normal colonoscopy this year so feel diverticulitis is less likely.  Also urine had no blood.  Discussed with patient that I do not feel scan is needed at this time.

## 2024-05-14 ENCOUNTER — OFFICE VISIT (OUTPATIENT)
Age: 69
End: 2024-05-14
Payer: MEDICARE

## 2024-05-14 VITALS
WEIGHT: 153 LBS | DIASTOLIC BLOOD PRESSURE: 80 MMHG | SYSTOLIC BLOOD PRESSURE: 120 MMHG | HEIGHT: 64 IN | BODY MASS INDEX: 26.12 KG/M2 | HEART RATE: 72 BPM

## 2024-05-14 DIAGNOSIS — Z92.21 HISTORY OF CHEMOTHERAPY: ICD-10-CM

## 2024-05-14 DIAGNOSIS — I10 ESSENTIAL HYPERTENSION: Primary | ICD-10-CM

## 2024-05-14 DIAGNOSIS — I70.0 CALCIFICATION OF AORTA (HCC): ICD-10-CM

## 2024-05-14 DIAGNOSIS — E78.2 MIXED HYPERLIPIDEMIA: ICD-10-CM

## 2024-05-14 PROCEDURE — 1123F ACP DISCUSS/DSCN MKR DOCD: CPT | Performed by: INTERNAL MEDICINE

## 2024-05-14 PROCEDURE — G8427 DOCREV CUR MEDS BY ELIG CLIN: HCPCS | Performed by: INTERNAL MEDICINE

## 2024-05-14 PROCEDURE — G8417 CALC BMI ABV UP PARAM F/U: HCPCS | Performed by: INTERNAL MEDICINE

## 2024-05-14 PROCEDURE — 3074F SYST BP LT 130 MM HG: CPT | Performed by: INTERNAL MEDICINE

## 2024-05-14 PROCEDURE — 1036F TOBACCO NON-USER: CPT | Performed by: INTERNAL MEDICINE

## 2024-05-14 PROCEDURE — G8399 PT W/DXA RESULTS DOCUMENT: HCPCS | Performed by: INTERNAL MEDICINE

## 2024-05-14 PROCEDURE — 99204 OFFICE O/P NEW MOD 45 MIN: CPT | Performed by: INTERNAL MEDICINE

## 2024-05-14 PROCEDURE — 1090F PRES/ABSN URINE INCON ASSESS: CPT | Performed by: INTERNAL MEDICINE

## 2024-05-14 PROCEDURE — 3017F COLORECTAL CA SCREEN DOC REV: CPT | Performed by: INTERNAL MEDICINE

## 2024-05-14 PROCEDURE — 3079F DIAST BP 80-89 MM HG: CPT | Performed by: INTERNAL MEDICINE

## 2024-05-14 PROCEDURE — 93000 ELECTROCARDIOGRAM COMPLETE: CPT | Performed by: INTERNAL MEDICINE

## 2024-05-14 RX ORDER — LANOLIN ALCOHOL/MO/W.PET/CERES
400 CREAM (GRAM) TOPICAL DAILY
COMMUNITY

## 2024-05-14 ASSESSMENT — ENCOUNTER SYMPTOMS
BLURRED VISION: 0
ORTHOPNEA: 0
ABDOMINAL PAIN: 0
COUGH: 0
BACK PAIN: 0
DOUBLE VISION: 0
VOMITING: 0
NAUSEA: 0
SHORTNESS OF BREATH: 0

## 2024-05-14 NOTE — PROGRESS NOTES
[facility] CARDIOLOGY  [address] [place] [address], [address]  [place] [address]  PHONE: [phone]    24    NAME:  Leah Adair  : 1955  MRN: [id number]         SUBJECTIVE:   Leah Adair is a 68 y.o. female seen for a visit regarding the following:     Chief Complaint   Patient presents with    Hypertension       HPI:      No prior hx of CAD. Hx of breast cancer (; s/p b/l mastectomy; had chemo/radiation; had adriamycin/cytoxan/taxol). Noted carotid dopplers which were unremarkable (10/19).  Prior noted Holter from  with occasional PAC/PVCs (appears symptoms correlated  with PVCs). Holter with SR and occ PACs/PVCs (5 days; ).  CT scan of the chest done from 3/2024 for routine follow-up with noted calcific vascular disease of the thoracic aorta.    Not seen since  and presents today due to above noted CT scan of the chest.  Denies any palpitations.  Well-controlled home BPs.  Very active at baseline with no exertional chest discomfort.  Exercises almost daily with her .    Palpitations-controlled, history of chemotherapy-stable, hypertension-controlled, aortic calcifications-controlled    Past Medical History, Past Surgical History, Family history, Social History, and Medications were all reviewed with the patient today and updated as necessary.     Allergies   Allergen Reactions    Latex Anaphylaxis and Rash     The anaphylactic reaction was with latex paint    Lisinopril Anaphylaxis    Ondansetron Hcl Rash     Intense vasodilation with redness, hypotension, responded to epinephrine    Amlodipine Swelling     ankles swell    Kenalog [Triamcinolone Acetonide] Rash     after topical and lumbar steroid injection.    Prednisone Rash    Triamcinolone Rash     Patient Active Problem List   Diagnosis    Arthritis of carpometacarpal (CMC) joint of right thumb    Osteoarthritis of cervical spine    Essential hypertension    Hyperlipidemia    Bilateral carotid artery

## 2024-07-03 ENCOUNTER — OFFICE VISIT (OUTPATIENT)
Dept: PULMONOLOGY | Age: 69
End: 2024-07-03
Payer: MEDICARE

## 2024-07-03 VITALS
HEART RATE: 76 BPM | DIASTOLIC BLOOD PRESSURE: 80 MMHG | TEMPERATURE: 98 F | RESPIRATION RATE: 20 BRPM | SYSTOLIC BLOOD PRESSURE: 120 MMHG | WEIGHT: 155 LBS | OXYGEN SATURATION: 97 % | HEIGHT: 65 IN | BODY MASS INDEX: 25.83 KG/M2

## 2024-07-03 DIAGNOSIS — R05.3 CHRONIC COUGH: Primary | ICD-10-CM

## 2024-07-03 DIAGNOSIS — J32.9 CHRONIC SINUSITIS, UNSPECIFIED LOCATION: ICD-10-CM

## 2024-07-03 DIAGNOSIS — K21.9 GASTROESOPHAGEAL REFLUX DISEASE WITHOUT ESOPHAGITIS: ICD-10-CM

## 2024-07-03 DIAGNOSIS — R91.8 LUNG NODULES: ICD-10-CM

## 2024-07-03 DIAGNOSIS — C50.919 MALIGNANT NEOPLASM OF FEMALE BREAST, UNSPECIFIED ESTROGEN RECEPTOR STATUS, UNSPECIFIED LATERALITY, UNSPECIFIED SITE OF BREAST (HCC): ICD-10-CM

## 2024-07-03 PROCEDURE — 1090F PRES/ABSN URINE INCON ASSESS: CPT | Performed by: INTERNAL MEDICINE

## 2024-07-03 PROCEDURE — G8427 DOCREV CUR MEDS BY ELIG CLIN: HCPCS | Performed by: INTERNAL MEDICINE

## 2024-07-03 PROCEDURE — 3074F SYST BP LT 130 MM HG: CPT | Performed by: INTERNAL MEDICINE

## 2024-07-03 PROCEDURE — 3079F DIAST BP 80-89 MM HG: CPT | Performed by: INTERNAL MEDICINE

## 2024-07-03 PROCEDURE — 99204 OFFICE O/P NEW MOD 45 MIN: CPT | Performed by: INTERNAL MEDICINE

## 2024-07-03 PROCEDURE — 3017F COLORECTAL CA SCREEN DOC REV: CPT | Performed by: INTERNAL MEDICINE

## 2024-07-03 PROCEDURE — G8399 PT W/DXA RESULTS DOCUMENT: HCPCS | Performed by: INTERNAL MEDICINE

## 2024-07-03 PROCEDURE — G8417 CALC BMI ABV UP PARAM F/U: HCPCS | Performed by: INTERNAL MEDICINE

## 2024-07-03 PROCEDURE — G2211 COMPLEX E/M VISIT ADD ON: HCPCS | Performed by: INTERNAL MEDICINE

## 2024-07-03 PROCEDURE — 1123F ACP DISCUSS/DSCN MKR DOCD: CPT | Performed by: INTERNAL MEDICINE

## 2024-07-03 PROCEDURE — 1036F TOBACCO NON-USER: CPT | Performed by: INTERNAL MEDICINE

## 2024-07-03 NOTE — PATIENT INSTRUCTIONS
Start taking over the counter anti histamine such as claritin, zyrtec, allegra.   Add Astepro over the counter nasal spray to this as well.   You can add nasal saline washes as well.

## 2024-07-03 NOTE — PROGRESS NOTES
breast, left    Chronic back pain     Chronic pain     pt denies presently- lumbar sometimes    GERD (gastroesophageal reflux disease)     well controlled    History of breast cancer 2/1/2018    ER/VA positive T2 NxER+VA+ double mastectomy 2006    HTN (hypertension)     120s/80s- controlled with med    Hyperlipidemia 4/19/2018    Latex allergy 2/1/2018    Osteoarthritis of cervical spine 11/28/2019    Thromboembolus (HCC)     jugular vein thrombus (port)    Thyroglossal cyst 7/13/2012 2011         Tobacco Use      Smoking status: Never      Smokeless tobacco: Never    Allergies   Allergen Reactions    Latex Anaphylaxis and Rash     The anaphylactic reaction was with latex paint    Lisinopril Anaphylaxis    Ondansetron Hcl Rash     Intense vasodilation with redness, hypotension, responded to epinephrine    Amlodipine Swelling     ankles swell    Kenalog [Triamcinolone Acetonide] Rash     after topical and lumbar steroid injection.    Prednisone Rash    Triamcinolone Rash     Current Outpatient Medications   Medication Instructions    Alpha Lipoic Acid 200 mg, Oral, DAILY    ascorbic acid (VITAMIN C) 1,000 mg, Oral, DAILY    aspirin 81 mg, Oral, DAILY    atorvastatin (LIPITOR) 10 MG tablet Take 1 tablet by mouth nightly    calcium citrate (CALCITRATE) 950 (200 Ca) MG tablet Take 1 tablet by mouth daily Calcium 630 mg and Vitamin D3 12.5 Mcg per pt    Cholecalciferol (VITAMIN D) 50 MCG (2000 UT) CAPS capsule 25 capsules, Oral, DAILY, Doing 1000    Cranberry 15,000 mg, Oral, DAILY    diclofenac sodium (VOLTAREN) 2 g, Topical, 4 TIMES DAILY PRN    ECHINACEA EXTRACT  mg, Oral, DAILY    ELDERBERRY PO 50 mg, Oral, DAILY    folic acid (FOLVITE) 400 mcg, Oral, DAILY    Garlic 1,000 mg, Oral    Ginkgo Biloba 120 mg, Oral, DAILY    MELATONIN ER PO 4 mg, Oral, NIGHTLY PRN    metoprolol succinate (TOPROL XL) 50 mg, Oral, DAILY    Misc Natural Products (BEET ROOT PO) 1,000 mg, Oral, THREE TIMES WEEKLY (MONDAY, WEDNESDAY,

## 2024-07-09 ENCOUNTER — NURSE ONLY (OUTPATIENT)
Dept: PULMONOLOGY | Age: 69
End: 2024-07-09
Payer: MEDICARE

## 2024-07-09 DIAGNOSIS — R05.3 CHRONIC COUGH: Primary | ICD-10-CM

## 2024-07-09 LAB
FEV 1 , POC: 1.95 L
FEV1 % PRED, POC: 88 %
FEV1/FVC, POC: NORMAL
FVC % PRED, POC: 93 %
FVC, POC: NORMAL

## 2024-07-09 PROCEDURE — 94060 EVALUATION OF WHEEZING: CPT | Performed by: INTERNAL MEDICINE

## 2024-07-09 PROCEDURE — 94729 DIFFUSING CAPACITY: CPT | Performed by: INTERNAL MEDICINE

## 2024-07-09 PROCEDURE — 94726 PLETHYSMOGRAPHY LUNG VOLUMES: CPT | Performed by: INTERNAL MEDICINE

## 2024-07-09 ASSESSMENT — PULMONARY FUNCTION TESTS
FVC_PERCENT_PREDICTED_POC: 93
FEV1_PERCENT_PREDICTED_POC: 88

## 2024-08-01 DIAGNOSIS — M81.0 POSTMENOPAUSAL OSTEOPOROSIS: Primary | ICD-10-CM

## 2024-08-08 RX ORDER — ONDANSETRON 2 MG/ML
8 INJECTION INTRAMUSCULAR; INTRAVENOUS
OUTPATIENT
Start: 2024-08-22

## 2024-08-08 RX ORDER — ACETAMINOPHEN 325 MG/1
650 TABLET ORAL
OUTPATIENT
Start: 2024-08-22

## 2024-08-08 RX ORDER — EPINEPHRINE 1 MG/ML
0.3 INJECTION, SOLUTION, CONCENTRATE INTRAVENOUS PRN
OUTPATIENT
Start: 2024-08-22

## 2024-08-08 RX ORDER — SODIUM CHLORIDE 9 MG/ML
INJECTION, SOLUTION INTRAVENOUS CONTINUOUS
OUTPATIENT
Start: 2024-08-22

## 2024-08-08 RX ORDER — FAMOTIDINE 10 MG/ML
20 INJECTION, SOLUTION INTRAVENOUS
OUTPATIENT
Start: 2024-08-22

## 2024-08-08 RX ORDER — ALBUTEROL SULFATE 90 UG/1
4 AEROSOL, METERED RESPIRATORY (INHALATION) PRN
OUTPATIENT
Start: 2024-08-22

## 2024-08-08 RX ORDER — DIPHENHYDRAMINE HYDROCHLORIDE 50 MG/ML
50 INJECTION INTRAMUSCULAR; INTRAVENOUS
OUTPATIENT
Start: 2024-08-22

## 2024-08-19 DIAGNOSIS — M81.0 POSTMENOPAUSAL OSTEOPOROSIS: ICD-10-CM

## 2024-08-22 ENCOUNTER — OFFICE VISIT (OUTPATIENT)
Dept: RHEUMATOLOGY | Age: 69
End: 2024-08-22
Payer: MEDICARE

## 2024-08-22 ENCOUNTER — NURSE ONLY (OUTPATIENT)
Dept: RHEUMATOLOGY | Age: 69
End: 2024-08-22
Payer: MEDICARE

## 2024-08-22 VITALS — SYSTOLIC BLOOD PRESSURE: 138 MMHG | TEMPERATURE: 98.1 F | DIASTOLIC BLOOD PRESSURE: 85 MMHG | HEART RATE: 68 BPM

## 2024-08-22 VITALS
DIASTOLIC BLOOD PRESSURE: 85 MMHG | WEIGHT: 154 LBS | BODY MASS INDEX: 27.29 KG/M2 | HEART RATE: 68 BPM | HEIGHT: 63 IN | SYSTOLIC BLOOD PRESSURE: 138 MMHG

## 2024-08-22 DIAGNOSIS — M81.0 POSTMENOPAUSAL OSTEOPOROSIS: Primary | ICD-10-CM

## 2024-08-22 PROCEDURE — 3079F DIAST BP 80-89 MM HG: CPT | Performed by: INTERNAL MEDICINE

## 2024-08-22 PROCEDURE — 96372 THER/PROPH/DIAG INJ SC/IM: CPT | Performed by: INTERNAL MEDICINE

## 2024-08-22 PROCEDURE — 1123F ACP DISCUSS/DSCN MKR DOCD: CPT | Performed by: INTERNAL MEDICINE

## 2024-08-22 PROCEDURE — 1036F TOBACCO NON-USER: CPT | Performed by: INTERNAL MEDICINE

## 2024-08-22 PROCEDURE — G8427 DOCREV CUR MEDS BY ELIG CLIN: HCPCS | Performed by: INTERNAL MEDICINE

## 2024-08-22 PROCEDURE — 1090F PRES/ABSN URINE INCON ASSESS: CPT | Performed by: INTERNAL MEDICINE

## 2024-08-22 PROCEDURE — G8417 CALC BMI ABV UP PARAM F/U: HCPCS | Performed by: INTERNAL MEDICINE

## 2024-08-22 PROCEDURE — 3017F COLORECTAL CA SCREEN DOC REV: CPT | Performed by: INTERNAL MEDICINE

## 2024-08-22 PROCEDURE — 99213 OFFICE O/P EST LOW 20 MIN: CPT | Performed by: INTERNAL MEDICINE

## 2024-08-22 PROCEDURE — G8399 PT W/DXA RESULTS DOCUMENT: HCPCS | Performed by: INTERNAL MEDICINE

## 2024-08-22 PROCEDURE — 3075F SYST BP GE 130 - 139MM HG: CPT | Performed by: INTERNAL MEDICINE

## 2024-08-22 RX ORDER — ALBUTEROL SULFATE 90 UG/1
4 AEROSOL, METERED RESPIRATORY (INHALATION) PRN
OUTPATIENT
Start: 2025-02-20

## 2024-08-22 RX ORDER — EPINEPHRINE 1 MG/ML
0.3 INJECTION, SOLUTION, CONCENTRATE INTRAVENOUS PRN
OUTPATIENT
Start: 2025-02-20

## 2024-08-22 RX ORDER — DIPHENHYDRAMINE HYDROCHLORIDE 50 MG/ML
50 INJECTION INTRAMUSCULAR; INTRAVENOUS
OUTPATIENT
Start: 2025-02-20

## 2024-08-22 RX ORDER — ONDANSETRON 2 MG/ML
8 INJECTION INTRAMUSCULAR; INTRAVENOUS
OUTPATIENT
Start: 2025-02-20

## 2024-08-22 RX ORDER — ACETAMINOPHEN 325 MG/1
650 TABLET ORAL
OUTPATIENT
Start: 2025-02-20

## 2024-08-22 RX ORDER — SODIUM CHLORIDE 9 MG/ML
INJECTION, SOLUTION INTRAVENOUS CONTINUOUS
OUTPATIENT
Start: 2025-02-20

## 2024-08-22 NOTE — PROGRESS NOTES
[facility] RHEUMATOLOGY  [address] [place] [address], [address]  [place] [address]  Office : [phone], Fax: [phone]       # 2 Prolia 60mg/ml injected SC today into right arm. Patient tolerated injection well. Advised patient to continue with calcium and vitamin D post injection. Advised patient to call with any problems post injection.   Medication ordered by Dr. Espinoza    Pre-infusion/injection questionairre for osteoporosis    1. Have you had or are you planning any dental work? No    2. Are you taking your calcium and vitamin D? Yes    3. When was your last osteoporosis treatment? 2/7/24    4. Have you had any recent fractures? No    5. Are you currently in skilled nursing or in patient rehab? No

## 2024-08-22 NOTE — PROGRESS NOTES
[facility] RHEUMATOLOGY  ABHAY Espinoza M.D.     Phone: [phone]   Fax: [phone]    8/22/2024 10:32 AM      SUBJECTIVE: Per previous not from Dr. Espinoza on 1/11/2024      Leah Adair is a 68 y.o. female referred for abnormal bone density with high fracture risk.  She has had numerous DEXA's over the last several years in about 2011 and she had osteopenia occasion with moderate fracture risk.  The most recent one done in September 2022 showed osteopenia as noted below with high fracture risk.  She was tried on alendronate but apparently got adverse GI symptomatology and it was discontinued in the recent past.  She does have a history of symptomatic GERD that is apparently controlled on Prilosec.    She was treated for breast cancer back in 2005 and 2006 and did take aromatase inhibitors for 5 years after chemotherapy but finished those and 2012.  No family history of hip fractures she has had no significant fractures after a couple of toes in the past.  Menopause was in 2009 when she had a total hysterectomy.  No diabetes no treatment with steroid to the long-term that she has been on chronic proton pump inhibitors.    Denosumab: First injection 3/7/2024    Since last visit 1/11/2024:    Osteoporosis: She was first seen 1/11/2024.  She got her first denosumab injection on 2/7/2024 and will get #2 today.  Osteoporosis lab was unremarkable with a therapeutic vitamin D level of 46.4.  Last visit I switched her to Citracal maximum plus to twice a day and vitamin D 1000 IUs daily though she may not need the extra vitamin D if she is using the Citracal maximum plus.  She is taking the Citracal but it is a generic brand from [employer] and only has about 50 international units of vitamin D in it when she is on supplemental vitamin D so she should be in good shape.  I did tell her again about the Citracal maximum plus and told her if she was going to switch to that it to twice a day she can stop

## 2024-08-23 LAB
ALBUMIN SERPL ELPH-MCNC: 3.9 G/DL (ref 2.9–4.4)
ALBUMIN/GLOB SERPL: 1.6 (ref 0.7–1.7)
ALPHA1 GLOB SERPL ELPH-MCNC: 0.2 G/DL (ref 0–0.4)
ALPHA2 GLOB SERPL ELPH-MCNC: 0.6 G/DL (ref 0.4–1)
B-GLOBULIN SERPL ELPH-MCNC: 0.9 G/DL (ref 0.7–1.3)
GAMMA GLOB SERPL ELPH-MCNC: 0.9 G/DL (ref 0.4–1.8)
GLOBULIN SER CALC-MCNC: 2.5 G/DL (ref 2.2–3.9)
M PROTEIN SERPL ELPH-MCNC: NORMAL G/DL
PROT SERPL-MCNC: 6.4 G/DL (ref 6–8.5)

## 2024-10-02 ENCOUNTER — OFFICE VISIT (OUTPATIENT)
Age: 69
End: 2024-10-02
Payer: MEDICARE

## 2024-10-02 DIAGNOSIS — K21.9 GASTROESOPHAGEAL REFLUX DISEASE WITHOUT ESOPHAGITIS: ICD-10-CM

## 2024-10-02 DIAGNOSIS — R91.8 LUNG NODULES: ICD-10-CM

## 2024-10-02 DIAGNOSIS — R05.3 CHRONIC COUGH: Primary | ICD-10-CM

## 2024-10-02 DIAGNOSIS — C50.919 MALIGNANT NEOPLASM OF FEMALE BREAST, UNSPECIFIED ESTROGEN RECEPTOR STATUS, UNSPECIFIED LATERALITY, UNSPECIFIED SITE OF BREAST (HCC): ICD-10-CM

## 2024-10-02 DIAGNOSIS — J32.9 CHRONIC SINUSITIS, UNSPECIFIED LOCATION: ICD-10-CM

## 2024-10-02 PROCEDURE — 1090F PRES/ABSN URINE INCON ASSESS: CPT | Performed by: INTERNAL MEDICINE

## 2024-10-02 PROCEDURE — G8427 DOCREV CUR MEDS BY ELIG CLIN: HCPCS | Performed by: INTERNAL MEDICINE

## 2024-10-02 PROCEDURE — 1036F TOBACCO NON-USER: CPT | Performed by: INTERNAL MEDICINE

## 2024-10-02 PROCEDURE — G8417 CALC BMI ABV UP PARAM F/U: HCPCS | Performed by: INTERNAL MEDICINE

## 2024-10-02 PROCEDURE — G8399 PT W/DXA RESULTS DOCUMENT: HCPCS | Performed by: INTERNAL MEDICINE

## 2024-10-02 PROCEDURE — 99214 OFFICE O/P EST MOD 30 MIN: CPT | Performed by: INTERNAL MEDICINE

## 2024-10-02 PROCEDURE — G2211 COMPLEX E/M VISIT ADD ON: HCPCS | Performed by: INTERNAL MEDICINE

## 2024-10-02 PROCEDURE — G8484 FLU IMMUNIZE NO ADMIN: HCPCS | Performed by: INTERNAL MEDICINE

## 2024-10-02 PROCEDURE — 3017F COLORECTAL CA SCREEN DOC REV: CPT | Performed by: INTERNAL MEDICINE

## 2024-10-02 PROCEDURE — 1123F ACP DISCUSS/DSCN MKR DOCD: CPT | Performed by: INTERNAL MEDICINE

## 2024-10-02 RX ORDER — FLUTICASONE FUROATE 100 UG/1
1 POWDER RESPIRATORY (INHALATION) DAILY
Qty: 1 EACH | Refills: 11 | Status: SHIPPED | OUTPATIENT
Start: 2024-10-02

## 2024-10-02 RX ORDER — AZELASTINE 1 MG/ML
2 SPRAY, METERED NASAL 2 TIMES DAILY
Qty: 120 ML | Refills: 1 | Status: SHIPPED | OUTPATIENT
Start: 2024-10-02

## 2024-10-02 NOTE — PROGRESS NOTES
IPOL, (age 6w+), SC/IM, 0.5mL 05/10/2001    TDaP, ADACEL (age 10y-64y), BOOSTRIX (age 10y+), IM, 0.5mL 12/04/2007, 02/01/2018    Typhoid Vi capsular polysaccharide (Typhim VI) 05/12/2000    Yellow Fever (YF-Vax) 05/01/2010    Zoster Live (Zostavax) 03/08/2015    Zoster Recombinant (Shingrix) 01/21/2019, 05/14/2019     Past Medical History:   Diagnosis Date    Adverse effect of anesthesia     BP drops during surgery, no problem 2021    Allergic rhinitis 12/12/2018    Arthritis     DDD    Bilateral carotid artery stenosis 4/19/2018    Cancer (HCC)     breast, left    Chronic back pain     Chronic pain     pt denies presently- lumbar sometimes    Emphysema of lung (HCC) 2024    GERD (gastroesophageal reflux disease)     well controlled    History of breast cancer 2/1/2018    ER/TN positive T2 NxER+TN+ double mastectomy 2006    HTN (hypertension)     120s/80s- controlled with med    Hyperlipidemia 4/19/2018    Latex allergy 2/1/2018    Osteoarthritis of cervical spine 11/28/2019    Thromboembolus (HCC)     jugular vein thrombus (port)    Thyroglossal cyst 7/13/2012 2011         Tobacco Use      Smoking status: Never      Smokeless tobacco: Never    Allergies   Allergen Reactions    Latex Anaphylaxis and Rash     The anaphylactic reaction was with latex paint    Lisinopril Anaphylaxis    Ondansetron Hcl Rash     Intense vasodilation with redness, hypotension, responded to epinephrine    Amlodipine Swelling     ankles swell    Kenalog [Triamcinolone Acetonide] Rash     after topical and lumbar steroid injection.    Prednisone Rash    Triamcinolone Rash     Current Outpatient Medications   Medication Instructions    Alpha Lipoic Acid 200 mg, Oral, DAILY    ascorbic acid (VITAMIN C) 1,000 mg, Oral, DAILY    atorvastatin (LIPITOR) 10 MG tablet Take 1 tablet by mouth nightly    azelastine (ASTELIN) 0.1 % nasal spray 2 sprays, Nasal, 2 TIMES DAILY, Use in each nostril as directed    calcium citrate (CALCITRATE) 950 (200 Ca)

## 2024-11-03 NOTE — PROGRESS NOTES
Leah Adair is a 69 y.o. female who presents today for the following:  Chief Complaint   Patient presents with    Incontinence    Blood Pressure Check     Spikes while traveling       Allergies   Allergen Reactions    Latex Anaphylaxis and Rash     The anaphylactic reaction was with latex paint    Lisinopril Anaphylaxis    Ondansetron Hcl Rash     Intense vasodilation with redness, hypotension, responded to epinephrine    Amlodipine Swelling     ankles swell    Kenalog [Triamcinolone Acetonide] Rash     after topical and lumbar steroid injection.    Prednisone Rash    Triamcinolone Rash       Current Outpatient Medications   Medication Sig Dispense Refill    triamterene-hydroCHLOROthiazide (MAXZIDE-25) 37.5-25 MG per tablet Take 1 tablet by mouth daily as needed (bp >140/90) 90 tablet 1    nitrofurantoin, macrocrystal-monohydrate, (MACROBID) 100 MG capsule Take 1 capsule by mouth 2 times daily for 7 days 14 capsule 0    cefpodoxime (VANTIN) 200 MG tablet Take 1 tablet by mouth 2 times daily for 10 days 20 tablet 0    atorvastatin (LIPITOR) 10 MG tablet Take 1 tablet by mouth nightly 90 tablet 2    metoprolol succinate (TOPROL XL) 50 MG extended release tablet Take 1 tablet by mouth daily 90 tablet 2    omeprazole (PRILOSEC) 20 MG delayed release capsule Take 1 capsule by mouth once daily 90 capsule 2    azelastine (ASTELIN) 0.1 % nasal spray 2 sprays by Nasal route 2 times daily Use in each nostril as directed 120 mL 1    fluticasone (ARNUITY ELLIPTA) 100 MCG/ACT AEPB Inhale 1 puff into the lungs daily 1 each 11    MAGNESIUM PO Take 250 mg by mouth 3 TIMES WEEKLY      ELDERBERRY PO Take 50 mg by mouth daily      Misc Natural Products (BEET ROOT PO) Take 1,000 mg by mouth three times a week      Ginkgo Biloba 120 MG CAPS Take 120 mg by mouth daily      Cyanocobalamin (VITAMIN B-12) 2500 MCG SUBL Place 1 tablet under the tongue daily      denosumab (PROLIA) 60 MG/ML SOSY SC injection Inject 1 mL into the skin

## 2024-11-04 ENCOUNTER — OFFICE VISIT (OUTPATIENT)
Dept: FAMILY MEDICINE CLINIC | Facility: CLINIC | Age: 69
End: 2024-11-04

## 2024-11-04 VITALS
OXYGEN SATURATION: 97 % | HEIGHT: 63 IN | TEMPERATURE: 98.3 F | SYSTOLIC BLOOD PRESSURE: 126 MMHG | DIASTOLIC BLOOD PRESSURE: 76 MMHG | HEART RATE: 78 BPM | BODY MASS INDEX: 27.57 KG/M2 | WEIGHT: 155.6 LBS

## 2024-11-04 DIAGNOSIS — Z23 ENCOUNTER FOR IMMUNIZATION: ICD-10-CM

## 2024-11-04 DIAGNOSIS — B99.9 RECURRENT INFECTIONS: ICD-10-CM

## 2024-11-04 DIAGNOSIS — N39.46 MIXED INCONTINENCE URGE AND STRESS: ICD-10-CM

## 2024-11-04 DIAGNOSIS — I10 ESSENTIAL HYPERTENSION: ICD-10-CM

## 2024-11-04 DIAGNOSIS — K21.00 GASTROESOPHAGEAL REFLUX DISEASE WITH ESOPHAGITIS WITHOUT HEMORRHAGE: ICD-10-CM

## 2024-11-04 DIAGNOSIS — E78.2 MIXED HYPERLIPIDEMIA: ICD-10-CM

## 2024-11-04 DIAGNOSIS — I10 ESSENTIAL HYPERTENSION: Primary | ICD-10-CM

## 2024-11-04 LAB
ALBUMIN SERPL-MCNC: 4.2 G/DL (ref 3.2–4.6)
ALBUMIN/GLOB SERPL: 1.5 (ref 1–1.9)
ALP SERPL-CCNC: 73 U/L (ref 35–104)
ALT SERPL-CCNC: 28 U/L (ref 8–45)
ANION GAP SERPL CALC-SCNC: 10 MMOL/L (ref 7–16)
AST SERPL-CCNC: 34 U/L (ref 15–37)
BASOPHILS # BLD: 0 K/UL (ref 0–0.2)
BASOPHILS NFR BLD: 1 % (ref 0–2)
BILIRUB SERPL-MCNC: 0.6 MG/DL (ref 0–1.2)
BUN SERPL-MCNC: 15 MG/DL (ref 8–23)
CALCIUM SERPL-MCNC: 9.3 MG/DL (ref 8.8–10.2)
CHLORIDE SERPL-SCNC: 104 MMOL/L (ref 98–107)
CO2 SERPL-SCNC: 28 MMOL/L (ref 20–29)
CREAT SERPL-MCNC: 0.9 MG/DL (ref 0.6–1.1)
DIFFERENTIAL METHOD BLD: ABNORMAL
EOSINOPHIL # BLD: 0 K/UL (ref 0–0.8)
EOSINOPHIL NFR BLD: 0 % (ref 0.5–7.8)
ERYTHROCYTE [DISTWIDTH] IN BLOOD BY AUTOMATED COUNT: 13.2 % (ref 11.9–14.6)
GLOBULIN SER CALC-MCNC: 2.8 G/DL (ref 2.3–3.5)
GLUCOSE SERPL-MCNC: 96 MG/DL (ref 70–99)
HCT VFR BLD AUTO: 44.5 % (ref 35.8–46.3)
HGB BLD-MCNC: 14.4 G/DL (ref 11.7–15.4)
IMM GRANULOCYTES # BLD AUTO: 0 K/UL (ref 0–0.5)
IMM GRANULOCYTES NFR BLD AUTO: 0 % (ref 0–5)
LYMPHOCYTES # BLD: 1.4 K/UL (ref 0.5–4.6)
LYMPHOCYTES NFR BLD: 33 % (ref 13–44)
MCH RBC QN AUTO: 30.1 PG (ref 26.1–32.9)
MCHC RBC AUTO-ENTMCNC: 32.4 G/DL (ref 31.4–35)
MCV RBC AUTO: 92.9 FL (ref 82–102)
MONOCYTES # BLD: 0.3 K/UL (ref 0.1–1.3)
MONOCYTES NFR BLD: 8 % (ref 4–12)
NEUTS SEG # BLD: 2.4 K/UL (ref 1.7–8.2)
NEUTS SEG NFR BLD: 58 % (ref 43–78)
NRBC # BLD: 0 K/UL (ref 0–0.2)
PLATELET # BLD AUTO: 200 K/UL (ref 150–450)
PMV BLD AUTO: 10.1 FL (ref 9.4–12.3)
POTASSIUM SERPL-SCNC: 4.1 MMOL/L (ref 3.5–5.1)
PROT SERPL-MCNC: 7 G/DL (ref 6.3–8.2)
RBC # BLD AUTO: 4.79 M/UL (ref 4.05–5.2)
SODIUM SERPL-SCNC: 142 MMOL/L (ref 136–145)
WBC # BLD AUTO: 4.1 K/UL (ref 4.3–11.1)

## 2024-11-04 RX ORDER — METOPROLOL SUCCINATE 50 MG/1
50 TABLET, EXTENDED RELEASE ORAL DAILY
Qty: 90 TABLET | Refills: 2 | Status: SHIPPED | OUTPATIENT
Start: 2024-11-04

## 2024-11-04 RX ORDER — TRIAMTERENE AND HYDROCHLOROTHIAZIDE 37.5; 25 MG/1; MG/1
1 TABLET ORAL DAILY PRN
Qty: 90 TABLET | Refills: 1 | Status: SHIPPED | OUTPATIENT
Start: 2024-11-04

## 2024-11-04 RX ORDER — NITROFURANTOIN 25; 75 MG/1; MG/1
100 CAPSULE ORAL 2 TIMES DAILY
Qty: 14 CAPSULE | Refills: 0 | Status: SHIPPED | OUTPATIENT
Start: 2024-11-04 | End: 2024-11-11

## 2024-11-04 RX ORDER — ATORVASTATIN CALCIUM 10 MG/1
TABLET, FILM COATED ORAL
Qty: 90 TABLET | Refills: 2 | Status: SHIPPED | OUTPATIENT
Start: 2024-11-04

## 2024-11-04 RX ORDER — CEFPODOXIME PROXETIL 200 MG/1
200 TABLET, FILM COATED ORAL 2 TIMES DAILY
Qty: 20 TABLET | Refills: 0 | Status: SHIPPED | OUTPATIENT
Start: 2024-11-04 | End: 2024-11-14

## 2024-11-04 ASSESSMENT — ENCOUNTER SYMPTOMS
CHEST TIGHTNESS: 0
DIARRHEA: 0
WHEEZING: 0
NAUSEA: 0
CONSTIPATION: 0
BACK PAIN: 1
SHORTNESS OF BREATH: 0

## 2024-11-04 ASSESSMENT — PATIENT HEALTH QUESTIONNAIRE - PHQ9
SUM OF ALL RESPONSES TO PHQ QUESTIONS 1-9: 0
1. LITTLE INTEREST OR PLEASURE IN DOING THINGS: NOT AT ALL
2. FEELING DOWN, DEPRESSED OR HOPELESS: NOT AT ALL
SUM OF ALL RESPONSES TO PHQ QUESTIONS 1-9: 0
SUM OF ALL RESPONSES TO PHQ QUESTIONS 1-9: 0
SUM OF ALL RESPONSES TO PHQ9 QUESTIONS 1 & 2: 0
SUM OF ALL RESPONSES TO PHQ QUESTIONS 1-9: 0

## 2024-11-04 NOTE — ASSESSMENT & PLAN NOTE
Blood pressure is at goal on home readings. BP elevates when travelling likely secondary diet and activity changes during travel.  Add on diuretic as needed. In past had reactions to ACE and amlodipine.  Encouraged continued medication compliance, DASH or Mediterranean diet and daily physical activity.

## 2024-11-04 NOTE — ASSESSMENT & PLAN NOTE
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  Given antibiotics to take as needed while travelling.

## 2024-12-12 ENCOUNTER — OFFICE VISIT (OUTPATIENT)
Dept: UROGYNECOLOGY | Age: 69
End: 2024-12-12
Payer: MEDICARE

## 2024-12-12 VITALS — BODY MASS INDEX: 25.95 KG/M2 | HEIGHT: 64 IN | WEIGHT: 152 LBS

## 2024-12-12 DIAGNOSIS — N95.2 VAGINAL ATROPHY: ICD-10-CM

## 2024-12-12 DIAGNOSIS — N39.46 MIXED STRESS AND URGE URINARY INCONTINENCE: Primary | ICD-10-CM

## 2024-12-12 DIAGNOSIS — R10.2 PELVIC PAIN: ICD-10-CM

## 2024-12-12 LAB
BILIRUBIN, URINE, POC: NEGATIVE
BLOOD URINE, POC: NEGATIVE
GLUCOSE URINE, POC: NEGATIVE
KETONES, URINE, POC: NEGATIVE
LEUKOCYTE ESTERASE, URINE, POC: NEGATIVE
NITRITE, URINE, POC: NEGATIVE
PH, URINE, POC: 8.5 (ref 4.6–8)
PROTEIN,URINE, POC: NEGATIVE
SPECIFIC GRAVITY, URINE, POC: 1.02 (ref 1–1.03)
URINALYSIS CLARITY, POC: CLEAR
URINALYSIS COLOR, POC: YELLOW
UROBILINOGEN, POC: ABNORMAL MG/DL

## 2024-12-12 PROCEDURE — 1123F ACP DISCUSS/DSCN MKR DOCD: CPT | Performed by: OBSTETRICS & GYNECOLOGY

## 2024-12-12 PROCEDURE — 0509F URINE INCON PLAN DOCD: CPT | Performed by: OBSTETRICS & GYNECOLOGY

## 2024-12-12 PROCEDURE — 51701 INSERT BLADDER CATHETER: CPT | Performed by: OBSTETRICS & GYNECOLOGY

## 2024-12-12 PROCEDURE — 3017F COLORECTAL CA SCREEN DOC REV: CPT | Performed by: OBSTETRICS & GYNECOLOGY

## 2024-12-12 PROCEDURE — 81002 URINALYSIS NONAUTO W/O SCOPE: CPT | Performed by: OBSTETRICS & GYNECOLOGY

## 2024-12-12 PROCEDURE — G8482 FLU IMMUNIZE ORDER/ADMIN: HCPCS | Performed by: OBSTETRICS & GYNECOLOGY

## 2024-12-12 PROCEDURE — 1036F TOBACCO NON-USER: CPT | Performed by: OBSTETRICS & GYNECOLOGY

## 2024-12-12 PROCEDURE — G8417 CALC BMI ABV UP PARAM F/U: HCPCS | Performed by: OBSTETRICS & GYNECOLOGY

## 2024-12-12 PROCEDURE — G8399 PT W/DXA RESULTS DOCUMENT: HCPCS | Performed by: OBSTETRICS & GYNECOLOGY

## 2024-12-12 PROCEDURE — 1090F PRES/ABSN URINE INCON ASSESS: CPT | Performed by: OBSTETRICS & GYNECOLOGY

## 2024-12-12 PROCEDURE — G8428 CUR MEDS NOT DOCUMENT: HCPCS | Performed by: OBSTETRICS & GYNECOLOGY

## 2024-12-12 PROCEDURE — 99204 OFFICE O/P NEW MOD 45 MIN: CPT | Performed by: OBSTETRICS & GYNECOLOGY

## 2024-12-12 PROCEDURE — 99459 PELVIC EXAMINATION: CPT | Performed by: OBSTETRICS & GYNECOLOGY

## 2024-12-12 RX ORDER — ESTRADIOL 0.1 MG/G
1 CREAM VAGINAL
Qty: 42.5 G | Refills: 3 | Status: SHIPPED | OUTPATIENT
Start: 2024-12-13 | End: 2024-12-12

## 2024-12-12 NOTE — ASSESSMENT & PLAN NOTE
She is s/p breast cancer and having \"bone pain\". She last saw onc in march and her next apt is this coming march.     I recommend she call them sooner to r/o any bon involvement.

## 2024-12-12 NOTE — PROGRESS NOTES
tenderness  Bladder:  No tenderness, no masses palpated  Perineum:  Normal, no lesions    Rectal   Anorectal Exam: No hemorrhoids and no masses or lesions of the perineum    POP-Q: (Pelvic Organ Prolapse - Quantification Exam):       No data to display                 Pelvic floor muscles: Tender Spasm     R. Puborectalis: NO 0 /5    L. Puborectalis: NO 0 /5    R. Pubococcyg NO 0 /5    L. Pubococcyg NO 0 /5    R. Ileococcyg: NO 0 /5    L. Ileococcyg: NO 0 /5    R. Obturator Int: NO 0 /5    L. Obturator Int: NO 0 /5    R. Coccygeus: NO 0 /5    L. Coccygeus: NO 0 /5      Pelvic floor contractions: 3/5    Stress Test of DESI: negative    Post Void Residual collected by straight catheterization: 30 cc   The patient was in lithotomy position and the urethra was cleansed to maintain sterility. A 14Fr catheter was used to drain the bladder in sterile fashion.     1. Mixed stress and urge urinary incontinence  -     AMB POC URINALYSIS DIP STICK MANUAL W/O MICRO  -     INSERT,NON-INDWELLING BLADDER CATHETER  2. Pelvic pain  Assessment & Plan:   She is s/p breast cancer and having \"bone pain\". She last saw onc in march and her next apt is this coming march.     I recommend she call them sooner to r/o any bon involvement.   Orders:  -     Ambulatory referral to Physical Therapy  3. Vaginal atrophy  Assessment & Plan:  She has had breast cancer and now has pelvic pain. We are going to hold off on any estrogen cream until we know she does not have any recurrence.            Return in about 3 months (around 3/12/2025).    On this date 12/12/2024 I have spent 47 minutes reviewing previous notes, test results and face to face with the patient discussing the diagnosis and importance of compliance with the treatment plan as well as documenting on the day of the visit. This is exclusive of separately reported procedures.     Ailin Terry DO

## 2024-12-12 NOTE — ASSESSMENT & PLAN NOTE
She has had breast cancer and now has pelvic pain. We are going to hold off on any estrogen cream until we know she does not have any recurrence.

## 2024-12-12 NOTE — PATIENT INSTRUCTIONS
Pelvic Floor Therapy List:    Locations within [facility]    Scheduling phone number: [phone]    [facility]       [address]  [facility] [address]    [facility]  [address]  [facility] [address]    [facility]  [address]  [place] [address]    [facility]  [address] [address]      Locations Outside of [facility]    [facility]- (Yeimy Gilliam & Katrina Yap)  [address]  [facility] [address]  Phone: [phone]  Fax [phone]    [facility] -(Brandi Paige)  [address] [address]  Phone: [phone]  Fax: [phone]    [facility]  [address] [place] [address]  Phone: [phone]  Fax: [phone]    [facility] (Myah Mckeon)  [address]  [place] [id number]  Phone: [phone]  Fax: [phone]    [facility] (Azra Nugent)  [address] [address]  Phone: [phone]  Fax: [phone]    [facility] (Dr. Kristyn Flores and Dr. Myah Florentino)  [address] [place] [address]   Phone: [phone]  Fax: [phone]  &  [address] [address]    [facility] (Sary Klein)  [address] [place] [address]  Phone: [phone]  Fax: [phone]    [facility]- (Lisa Curiel)  [address]  [facility] [address]  Phone: [phone]  Fax: [phone]    [facility] (Geni Acuña)  [address] [place] [address]  [phone]  Fax [phone]    [facility]- (Angelo Hogan)  [address]   [place] [address]  Phone: [phone]  Fax: [phone]    [facility] (Katrina Valladares)  [address]  [place] [address]  Phone: [phone]  Fax: [phone]

## 2024-12-16 ENCOUNTER — TELEPHONE (OUTPATIENT)
Dept: RHEUMATOLOGY | Age: 69
End: 2024-12-16

## 2024-12-16 NOTE — TELEPHONE ENCOUNTER
Dr Knutson will see pt for the first time on 08/25/25. Pt seen Dr Espinoza on as NP on 01/11/24 and then FU on 08/22/24.     Pt left vm that she has some questions about Prolia, could it be causing problems that she is having?     I called and spoke with the pt, she had her first Prolia on 02/07/24 and her second one on 08/22/24. She said that she went on 2 long trips of driving/sitting or flying and she started having pains in her groin areas. She said that it started in 10/2024 after the trips and it seems to be a little sore all the time but is worse after sitting for a long while. Whether it be at her desk or riding. She said that she does exercises in the morning's and that seems to help the soreness. She said that when she stands a a lot that she barely even notice's that the soreness is there. She is wondering if the Prolia could be causing this?

## 2024-12-17 NOTE — TELEPHONE ENCOUNTER
This happens to be Dr. Espinoza's patient.  Please forward the message to him or talk to him when he comes on Thursday.

## 2025-01-17 ENCOUNTER — OFFICE VISIT (OUTPATIENT)
Dept: PULMONOLOGY | Age: 70
End: 2025-01-17

## 2025-01-17 VITALS
HEIGHT: 65 IN | OXYGEN SATURATION: 94 % | WEIGHT: 153 LBS | TEMPERATURE: 98 F | SYSTOLIC BLOOD PRESSURE: 140 MMHG | BODY MASS INDEX: 25.49 KG/M2 | RESPIRATION RATE: 20 BRPM | HEART RATE: 81 BPM | DIASTOLIC BLOOD PRESSURE: 80 MMHG

## 2025-01-17 DIAGNOSIS — J32.9 CHRONIC SINUSITIS, UNSPECIFIED LOCATION: ICD-10-CM

## 2025-01-17 DIAGNOSIS — R91.8 LUNG NODULES: Primary | ICD-10-CM

## 2025-01-17 DIAGNOSIS — Z85.3 HISTORY OF BREAST CANCER: ICD-10-CM

## 2025-01-17 DIAGNOSIS — R05.3 CHRONIC COUGH: ICD-10-CM

## 2025-01-17 RX ORDER — AZELASTINE 1 MG/ML
2 SPRAY, METERED NASAL 2 TIMES DAILY
Qty: 120 ML | Refills: 1 | Status: SHIPPED | OUTPATIENT
Start: 2025-01-17

## 2025-01-17 RX ORDER — FLUTICASONE FUROATE 100 UG/1
1 POWDER RESPIRATORY (INHALATION) DAILY
Qty: 1 EACH | Refills: 11 | Status: CANCELLED | OUTPATIENT
Start: 2025-01-17

## 2025-01-17 NOTE — PROGRESS NOTES
Oral    Ginkgo Biloba 120 mg, Oral, DAILY    MAGNESIUM  mg, Oral, 3 TIMES WEEKLY    metoprolol succinate (TOPROL XL) 50 mg, Oral, DAILY    Misc Natural Products (BEET ROOT PO) 1,000 mg, Oral, THREE TIMES WEEKLY (MONDAY, WEDNESDAY, FRIDAY)    Multiple Vitamins-Minerals (THERAPEUTIC MULTIVITAMIN-MINERALS) tablet 1 tablet, Oral, DAILY, Ca carb 300 vit d 1000    omeprazole (PRILOSEC) 20 MG delayed release capsule Take 1 capsule by mouth once daily    Prolia 60 mg, SubCUTAneous, ONCE, Every 6 months    triamterene-hydroCHLOROthiazide (MAXZIDE-25) 37.5-25 MG per tablet 1 tablet, Oral, DAILY PRN    Vitamin B-12 2,500 mcg, SubLINGual, DAILY

## 2025-01-17 NOTE — PATIENT INSTRUCTIONS
If you do not hear from Radiology Scheduling within 1 week please call the number below:    [facility]  P: [phone]

## 2025-02-06 ENCOUNTER — TELEPHONE (OUTPATIENT)
Dept: RHEUMATOLOGY | Age: 70
End: 2025-02-06

## 2025-02-06 NOTE — TELEPHONE ENCOUNTER
----- Message from Myah REYNOLDS sent at 1/27/2025  8:15 AM EST -----  Regarding: RE: appt  I would go ahead and schedule her. Zenia's next available NTP is may right now until we get work she will do more than 3 NTP a day. :(  ----- Message -----  From: Lizett Haider MA  Sent: 1/26/2025   8:14 PM EST  To: Lizett Haider MA; Myah Haile  Subject: appt                                             Another patient due for prolia 2/22/25- patient of dr tsai- needs follow up appt with zenia as early as possible if possible. If not, I will brittany for prolia. She would need labs prior. thanks

## 2025-02-06 NOTE — TELEPHONE ENCOUNTER
Previous patient of Dr. Espinoza. Due for Prolia 2/22/25.   CMP 11/4/24, vit D 1/11/24.   Will need labs updated.   Medicare and [employer] supplemental. Should cover 100% After pt meets [facility] ded.

## 2025-02-20 DIAGNOSIS — M81.0 POSTMENOPAUSAL OSTEOPOROSIS: Primary | ICD-10-CM

## 2025-02-21 DIAGNOSIS — M81.0 POSTMENOPAUSAL OSTEOPOROSIS: ICD-10-CM

## 2025-02-21 LAB
25(OH)D3 SERPL-MCNC: 48.5 NG/ML (ref 30–100)
ANION GAP SERPL CALC-SCNC: 9 MMOL/L (ref 7–16)
BUN SERPL-MCNC: 18 MG/DL (ref 8–23)
CALCIUM SERPL-MCNC: 9.5 MG/DL (ref 8.8–10.2)
CHLORIDE SERPL-SCNC: 104 MMOL/L (ref 98–107)
CO2 SERPL-SCNC: 29 MMOL/L (ref 20–29)
CREAT SERPL-MCNC: 0.87 MG/DL (ref 0.6–1.1)
GLUCOSE SERPL-MCNC: 77 MG/DL (ref 70–99)
POTASSIUM SERPL-SCNC: 4 MMOL/L (ref 3.5–5.1)
SODIUM SERPL-SCNC: 142 MMOL/L (ref 136–145)

## 2025-02-24 ENCOUNTER — TELEPHONE (OUTPATIENT)
Dept: RHEUMATOLOGY | Age: 70
End: 2025-02-24

## 2025-02-24 NOTE — TELEPHONE ENCOUNTER
Pt has upcoming Prolia on 2/26/25. Pt called requesting to cancel because she has continued pelvic bone pain. Pt states, \"I read up about the Prolia and it says it can cause this as a side effect.\" Pt unsure if she wants to continue the Prolia at all and is requesting an order for a CT scan of her pelvis before she will continue Prolia injections. Pt does have upcoming appt with Dr. Knutson in August.

## 2025-02-24 NOTE — TELEPHONE ENCOUNTER
Patient will need to follow-up with her PCP to have the CT scan done and decide about continuing the Prolia or not.  Her PCP can start her on an oral bisphosphonate if she is not willing to come in for the Prolia shot, but that decision will have to be made by her PCP as they do see and treat osteoporosis.

## 2025-02-26 NOTE — TELEPHONE ENCOUNTER
Called pt with Dr De Jesus response , she stated that she seen him a month ago and they felt like it was something urology wise , see seen them and they cleared her that nothing is going on there. So pt feels as if she isnt getting anywhere and she feels something is going on with her bones. As she is having a lot of pain in lower pelvic pain in her bones. But she will reach back out to them or an ortho

## 2025-03-03 ENCOUNTER — HOSPITAL ENCOUNTER (OUTPATIENT)
Dept: CT IMAGING | Age: 70
Discharge: HOME | End: 2025-03-05
Attending: INTERNAL MEDICINE
Payer: MEDICARE

## 2025-03-03 DIAGNOSIS — R91.8 LUNG NODULES: ICD-10-CM

## 2025-03-03 PROCEDURE — 71250 CT THORAX DX C-: CPT

## 2025-03-05 ENCOUNTER — OFFICE VISIT (OUTPATIENT)
Dept: ORTHOPEDIC SURGERY | Age: 70
End: 2025-03-05
Payer: MEDICARE

## 2025-03-05 DIAGNOSIS — M25.552 LEFT HIP PAIN: Primary | ICD-10-CM

## 2025-03-05 PROCEDURE — G8428 CUR MEDS NOT DOCUMENT: HCPCS | Performed by: ORTHOPAEDIC SURGERY

## 2025-03-05 PROCEDURE — G8399 PT W/DXA RESULTS DOCUMENT: HCPCS | Performed by: ORTHOPAEDIC SURGERY

## 2025-03-05 PROCEDURE — 99203 OFFICE O/P NEW LOW 30 MIN: CPT | Performed by: ORTHOPAEDIC SURGERY

## 2025-03-05 PROCEDURE — 3017F COLORECTAL CA SCREEN DOC REV: CPT | Performed by: ORTHOPAEDIC SURGERY

## 2025-03-05 PROCEDURE — 1036F TOBACCO NON-USER: CPT | Performed by: ORTHOPAEDIC SURGERY

## 2025-03-05 PROCEDURE — G8417 CALC BMI ABV UP PARAM F/U: HCPCS | Performed by: ORTHOPAEDIC SURGERY

## 2025-03-05 PROCEDURE — 1090F PRES/ABSN URINE INCON ASSESS: CPT | Performed by: ORTHOPAEDIC SURGERY

## 2025-03-05 PROCEDURE — 1123F ACP DISCUSS/DSCN MKR DOCD: CPT | Performed by: ORTHOPAEDIC SURGERY

## 2025-03-05 NOTE — PROGRESS NOTES
controlled    History of breast cancer 2/1/2018    ER/TN positive T2 NxER+TN+ double mastectomy 2006    HTN (hypertension)     120s/80s- controlled with med    Hyperlipidemia 4/19/2018    Latex allergy 2/1/2018    Osteoarthritis of cervical spine 11/28/2019    Thromboembolus (HCC)     jugular vein thrombus (port)    Thyroglossal cyst 7/13/2012 2011      .pmh  Past Surgical History:   Procedure Laterality Date    BLADDER SUSPENSION      bladder tack    BREAST BIOPSY      x5    BREAST RECONSTRUCTION      x2    COLONOSCOPY  2019    wnl repeat 5 yrs    COLONOSCOPY  01/28/2013 1- - colonoscopy - normal - repeat 5 years due to hx polyps and fhx colon cancer - Dr. Orta - sent for scanning    HEENT  02/2011    thyroglossal cyst removed    HYSTERECTOMY (CERVIX STATUS UNKNOWN)      HYSTERECTOMY, TOTAL ABDOMINAL (CERVIX REMOVED)      MASTECTOMY Bilateral     x2    ORTHOPEDIC SURGERY Right 2021    thumb reconstruction,    OTHER SURGICAL HISTORY      thyroid cyst removed    TN UNLISTED PROCEDURE ABDOMEN PERITONEUM & OMENTUM      breast breast reconstruction with abdominal liposuction    TONSILLECTOMY      UPPER GASTROINTESTINAL ENDOSCOPY  04/12/2007 4- - EGD - hiatal hernia and schatzki's ring no active esophagitis, gastritis or acid peptic injury - Dr. Gurpreet Orta - sent for scanning    UPPER GASTROINTESTINAL ENDOSCOPY  2020     Family History   Problem Relation Age of Onset    Stroke Mother     Breast Cancer Mother     Hypertension Brother     Heart Disease Brother         Valve replaced because of Rheumatic Fever as a child    High Blood Pressure Brother     Heart Disease Paternal Grandfather     Heart Attack Paternal Grandfather     Stroke Paternal Grandmother     Hypertension Paternal Grandmother     Diabetes Paternal Grandmother     Cancer Maternal Grandfather     Cancer Maternal Uncle         Cancer    Heart Disease Brother         due to rheumatic fever-valve surgery    Hypertension Brother     Colon

## 2025-03-07 ENCOUNTER — NURSE ONLY (OUTPATIENT)
Dept: RHEUMATOLOGY | Age: 70
End: 2025-03-07

## 2025-03-07 VITALS — SYSTOLIC BLOOD PRESSURE: 134 MMHG | TEMPERATURE: 96.9 F | DIASTOLIC BLOOD PRESSURE: 77 MMHG | HEART RATE: 75 BPM

## 2025-03-07 DIAGNOSIS — M81.0 POSTMENOPAUSAL OSTEOPOROSIS: Primary | ICD-10-CM

## 2025-03-07 RX ORDER — ACETAMINOPHEN 325 MG/1
650 TABLET ORAL
Status: DISCONTINUED | OUTPATIENT
Start: 2025-03-07 | End: 2025-03-07 | Stop reason: HOSPADM

## 2025-03-07 RX ORDER — DIPHENHYDRAMINE HYDROCHLORIDE 50 MG/ML
50 INJECTION INTRAMUSCULAR; INTRAVENOUS
Status: DISCONTINUED | OUTPATIENT
Start: 2025-03-07 | End: 2025-03-07 | Stop reason: HOSPADM

## 2025-03-07 RX ORDER — SODIUM CHLORIDE 9 MG/ML
INJECTION, SOLUTION INTRAVENOUS CONTINUOUS
Status: DISCONTINUED | OUTPATIENT
Start: 2025-03-07 | End: 2025-03-07 | Stop reason: HOSPADM

## 2025-03-07 RX ORDER — HYDROCORTISONE SODIUM SUCCINATE 100 MG/2ML
100 INJECTION INTRAMUSCULAR; INTRAVENOUS
Status: DISCONTINUED | OUTPATIENT
Start: 2025-03-07 | End: 2025-03-07 | Stop reason: HOSPADM

## 2025-03-07 RX ORDER — FAMOTIDINE 10 MG/ML
20 INJECTION, SOLUTION INTRAVENOUS
Status: DISCONTINUED | OUTPATIENT
Start: 2025-03-07 | End: 2025-03-07 | Stop reason: HOSPADM

## 2025-03-07 RX ORDER — ONDANSETRON 2 MG/ML
8 INJECTION INTRAMUSCULAR; INTRAVENOUS
Status: DISCONTINUED | OUTPATIENT
Start: 2025-03-07 | End: 2025-03-07 | Stop reason: HOSPADM

## 2025-03-07 RX ORDER — ALBUTEROL SULFATE 90 UG/1
4 INHALANT RESPIRATORY (INHALATION) PRN
Status: DISCONTINUED | OUTPATIENT
Start: 2025-03-07 | End: 2025-03-07 | Stop reason: HOSPADM

## 2025-03-07 RX ORDER — EPINEPHRINE 1 MG/ML
0.3 INJECTION, SOLUTION, CONCENTRATE INTRAVENOUS PRN
Status: DISCONTINUED | OUTPATIENT
Start: 2025-03-07 | End: 2025-03-07 | Stop reason: HOSPADM

## 2025-03-07 NOTE — PROGRESS NOTES
[facility] RHEUMATOLOGY  [address] [place] [address], [address]  [place] [address]  Office : [phone], Fax: [phone]       # 3 Prolia 60mg/ml injected SC today into right arm. Patient tolerated injection well. Advised patient to continue with calcium and vitamin D post injection. Advised patient to call with any problems post injection.       Patient Medication Supplied? no  Patient discharged feeling well and instructed to call the office with any issues.    Pre-infusion/injection questionairre for osteoporosis    1. Have you had or are you planning any dental work? No    2. Are you taking your calcium and vitamin D? Yes    3. When was your last osteoporosis treatment? 8/22/24    4. Have you had any recent fractures? No    5. Are you currently in skilled nursing or in patient rehab? No

## 2025-03-11 SDOH — ECONOMIC STABILITY: FOOD INSECURITY: WITHIN THE PAST 12 MONTHS, THE FOOD YOU BOUGHT JUST DIDN'T LAST AND YOU DIDN'T HAVE MONEY TO GET MORE.: NEVER TRUE

## 2025-03-11 SDOH — ECONOMIC STABILITY: FOOD INSECURITY: WITHIN THE PAST 12 MONTHS, YOU WORRIED THAT YOUR FOOD WOULD RUN OUT BEFORE YOU GOT MONEY TO BUY MORE.: NEVER TRUE

## 2025-03-11 SDOH — ECONOMIC STABILITY: INCOME INSECURITY: IN THE LAST 12 MONTHS, WAS THERE A TIME WHEN YOU WERE NOT ABLE TO PAY THE MORTGAGE OR RENT ON TIME?: NO

## 2025-03-11 SDOH — HEALTH STABILITY: PHYSICAL HEALTH: ON AVERAGE, HOW MANY DAYS PER WEEK DO YOU ENGAGE IN MODERATE TO STRENUOUS EXERCISE (LIKE A BRISK WALK)?: 5 DAYS

## 2025-03-11 SDOH — HEALTH STABILITY: PHYSICAL HEALTH: ON AVERAGE, HOW MANY MINUTES DO YOU ENGAGE IN EXERCISE AT THIS LEVEL?: 60 MIN

## 2025-03-11 SDOH — ECONOMIC STABILITY: TRANSPORTATION INSECURITY
IN THE PAST 12 MONTHS, HAS THE LACK OF TRANSPORTATION KEPT YOU FROM MEDICAL APPOINTMENTS OR FROM GETTING MEDICATIONS?: NO

## 2025-03-11 ASSESSMENT — LIFESTYLE VARIABLES
HOW OFTEN DO YOU HAVE A DRINK CONTAINING ALCOHOL: 1
HOW OFTEN DO YOU HAVE SIX OR MORE DRINKS ON ONE OCCASION: 1
HOW OFTEN DO YOU HAVE A DRINK CONTAINING ALCOHOL: NEVER
HOW MANY STANDARD DRINKS CONTAINING ALCOHOL DO YOU HAVE ON A TYPICAL DAY: 0
HOW MANY STANDARD DRINKS CONTAINING ALCOHOL DO YOU HAVE ON A TYPICAL DAY: PATIENT DOES NOT DRINK

## 2025-03-11 ASSESSMENT — PATIENT HEALTH QUESTIONNAIRE - PHQ9
SUM OF ALL RESPONSES TO PHQ QUESTIONS 1-9: 0
2. FEELING DOWN, DEPRESSED OR HOPELESS: NOT AT ALL
SUM OF ALL RESPONSES TO PHQ QUESTIONS 1-9: 0
1. LITTLE INTEREST OR PLEASURE IN DOING THINGS: NOT AT ALL

## 2025-03-12 ENCOUNTER — OFFICE VISIT (OUTPATIENT)
Dept: ONCOLOGY | Age: 70
End: 2025-03-12

## 2025-03-12 VITALS
BODY MASS INDEX: 26.49 KG/M2 | TEMPERATURE: 98.6 F | OXYGEN SATURATION: 97 % | RESPIRATION RATE: 12 BRPM | HEART RATE: 79 BPM | SYSTOLIC BLOOD PRESSURE: 142 MMHG | WEIGHT: 159 LBS | DIASTOLIC BLOOD PRESSURE: 80 MMHG | HEIGHT: 65 IN

## 2025-03-12 DIAGNOSIS — Z85.3 PERSONAL HISTORY OF MALIGNANT NEOPLASM OF BREAST: Primary | ICD-10-CM

## 2025-03-12 DIAGNOSIS — C50.919 MALIGNANT NEOPLASM OF FEMALE BREAST, UNSPECIFIED ESTROGEN RECEPTOR STATUS, UNSPECIFIED LATERALITY, UNSPECIFIED SITE OF BREAST (HCC): ICD-10-CM

## 2025-03-12 ASSESSMENT — PATIENT HEALTH QUESTIONNAIRE - PHQ9
SUM OF ALL RESPONSES TO PHQ QUESTIONS 1-9: 0
2. FEELING DOWN, DEPRESSED OR HOPELESS: NOT AT ALL
1. LITTLE INTEREST OR PLEASURE IN DOING THINGS: NOT AT ALL
SUM OF ALL RESPONSES TO PHQ QUESTIONS 1-9: 0

## 2025-03-12 NOTE — PROGRESS NOTES
[facility]: Office Visit Established Patient    Chief Complaint:    Chief Complaint   Patient presents with    Follow-up         History of Present Illness:  Ms. Adair is a 69 y.o. female who returns today for follow-up of resected breast cancer and pulmonary nodules.  She underwent bilateral mastectomy for a T2 breast cancer in 2006, she completed 5 years of endocrine therapy and is currently on surveillance.  She had some recurrent pulmonary infections for which Dr. Tyler had checked immunoglobulins which were normal.     Here for follow-up.  She is doing well.  She is now following with pulmonary for her chronic shortness of breath as well as lung nodules, which have now demonstrated stability over several years.  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 She notes some occasional pelvic/hip pain with periods of immobility, she had work-up with Dr. García with no etiology identified.      Review of Systems:  Constitutional: Negative.   HENT: Negative.   Eyes: Negative.   Respiratory: Negative.   Cardiovascular: Negative.   Gastrointestinal: Negative.   Genitourinary: Negative.   Musculoskeletal: Negative.   Skin: Negative.   Neurological: Negative.   Endo/Heme/Allergies: Negative.   Psychiatric/Behavioral: Negative.   All other systems reviewed and are negative.       Allergies   Allergen Reactions    Latex Anaphylaxis and Rash     The anaphylactic reaction was with latex paint    Lisinopril Anaphylaxis    Ondansetron Hcl Rash     Intense vasodilation with redness, hypotension, responded to epinephrine    Amlodipine Swelling     ankles swell    Kenalog [Triamcinolone Acetonide] Rash     after topical and lumbar steroid injection.    Prednisone Rash    Triamcinolone Rash     Past Medical History:   Diagnosis Date    Adverse effect of anesthesia     BP drops during surgery, no problem 2021    Allergic rhinitis 12/12/2018    Arthritis     DDD

## 2025-03-12 NOTE — PATIENT INSTRUCTIONS
Patient Information from Today's Visit    The members of your [facility] are listed below:    Physician Provider: Joselito Hood Medical Oncologist  Advanced Practice Clinician: Mary Healy NP  Registered Nurse: Magen DEY RN  Nurse Navigator: Shefali PENA RN and Kendal SHAY RN  Medical Assistant: Dominique BARCENAS CMA  :Kristyn DEL CASTILLO  Supportive Care Services: Devendra SHIPMAN LMSW    Diagnosis: Breast Cancer    Follow Up Instructions: 1 year      Treatment Summary has been discussed and given to patient:No      Current Labs: No labs drawn today            Please refer to After Visit Summary or MyChart for upcoming appointment information. If you have any questions regarding your upcoming schedule please reach out to your care team through [employer] or call [phone].    Please notify your assigned Nurse Navigator of any unplanned hospital admissions or Emergency Room visits within 24 hours of discharge.    -------------------------------------------------------------------------------------------------------------------  Please call our office at [phone] if you have any  of the following symptoms:   Fever of 100.5 or greater  Chills  Shortness of breath  Swelling or pain in one leg    After office hours an answering service is available and will contact a provider for emergencies or if you are experiencing any of the above symptoms.        MAGEN MCQUEEN RN

## 2025-03-13 ENCOUNTER — OFFICE VISIT (OUTPATIENT)
Dept: FAMILY MEDICINE CLINIC | Facility: CLINIC | Age: 70
End: 2025-03-13

## 2025-03-13 VITALS
DIASTOLIC BLOOD PRESSURE: 70 MMHG | WEIGHT: 155.2 LBS | HEIGHT: 64 IN | BODY MASS INDEX: 26.5 KG/M2 | OXYGEN SATURATION: 100 % | HEART RATE: 64 BPM | SYSTOLIC BLOOD PRESSURE: 128 MMHG | RESPIRATION RATE: 16 BRPM | TEMPERATURE: 98.6 F

## 2025-03-13 DIAGNOSIS — E78.2 MIXED HYPERLIPIDEMIA: ICD-10-CM

## 2025-03-13 DIAGNOSIS — Z00.00 MEDICARE ANNUAL WELLNESS VISIT, SUBSEQUENT: Primary | ICD-10-CM

## 2025-03-13 DIAGNOSIS — I10 ESSENTIAL HYPERTENSION: ICD-10-CM

## 2025-03-13 DIAGNOSIS — K21.00 GASTROESOPHAGEAL REFLUX DISEASE WITH ESOPHAGITIS WITHOUT HEMORRHAGE: ICD-10-CM

## 2025-03-13 LAB
CHOLEST SERPL-MCNC: 170 MG/DL (ref 0–200)
HDLC SERPL-MCNC: 71 MG/DL (ref 40–60)
HDLC SERPL: 2.4 (ref 0–5)
LDLC SERPL CALC-MCNC: 79 MG/DL (ref 0–100)
TRIGL SERPL-MCNC: 102 MG/DL (ref 0–150)
TSH W FREE THYROID IF ABNORMAL: 2.39 UIU/ML (ref 0.27–4.2)
VLDLC SERPL CALC-MCNC: 20 MG/DL (ref 6–23)

## 2025-03-13 RX ORDER — METOPROLOL SUCCINATE 50 MG/1
50 TABLET, EXTENDED RELEASE ORAL DAILY
Qty: 90 TABLET | Refills: 2 | Status: SHIPPED | OUTPATIENT
Start: 2025-03-13

## 2025-03-13 RX ORDER — TRIAMTERENE AND HYDROCHLOROTHIAZIDE 37.5; 25 MG/1; MG/1
1 TABLET ORAL DAILY PRN
Qty: 90 TABLET | Refills: 1 | Status: SHIPPED | OUTPATIENT
Start: 2025-03-13

## 2025-03-13 RX ORDER — OMEPRAZOLE 20 MG/1
CAPSULE, DELAYED RELEASE ORAL
Qty: 90 CAPSULE | Refills: 2 | Status: SHIPPED | OUTPATIENT
Start: 2025-03-13

## 2025-03-13 RX ORDER — ATORVASTATIN CALCIUM 10 MG/1
TABLET, FILM COATED ORAL
Qty: 90 TABLET | Refills: 2 | Status: SHIPPED | OUTPATIENT
Start: 2025-03-13

## 2025-03-13 ASSESSMENT — ENCOUNTER SYMPTOMS
DIARRHEA: 0
SHORTNESS OF BREATH: 0
CHEST TIGHTNESS: 0
CONSTIPATION: 0
BACK PAIN: 1
NAUSEA: 0
WHEEZING: 0

## 2025-03-13 NOTE — PROGRESS NOTES
visit.    Diagnoses and orders for visit:  1. Essential hypertension  Continue with diurectic prn  -     Lipid Panel; Future  -     TSH reflex to FT4; Future  -     triamterene-hydroCHLOROthiazide (MAXZIDE-25) 37.5-25 MG per tablet; Take 1 tablet by mouth daily as needed (bp >140/90), Disp-90 tablet, R-1Normal  -     metoprolol succinate (TOPROL XL) 50 MG extended release tablet; Take 1 tablet by mouth daily, Disp-90 tablet, R-2Normal  2. Mixed hyperlipidemia  -     Lipid Panel; Future  -     atorvastatin (LIPITOR) 10 MG tablet; Take 1 tablet by mouth nightly, Disp-90 tablet, R-2Normal  4. Gastroesophageal reflux disease with esophagitis without hemorrhage  -     omeprazole (PRILOSEC) 20 MG delayed release capsule; Take 1 capsule by mouth once daily, Disp-90 capsule, R-2Normal            The patient (or guardian, if applicable) and other individuals in attendance with the patient were advised that Artificial Intelligence will be utilized during this visit to record, process the conversation to generate a clinical note, and support improvement of the AI technology. The patient (or guardian, if applicable) and other individuals in attendance at the appointment consented to the use of AI, including the recording.          Patient informed, we will call with blood work results within one week.  If you have not heard regarding results in over a week, please contact office.  You can also review results on [employer].           Todd Snow MD

## 2025-03-14 ENCOUNTER — RESULTS FOLLOW-UP (OUTPATIENT)
Dept: FAMILY MEDICINE CLINIC | Facility: CLINIC | Age: 70
End: 2025-03-14

## 2025-03-18 NOTE — TELEPHONE ENCOUNTER
----- Message from Dr. Todd Snow MD sent at 3/14/2025  8:22 AM EDT -----  Lab work shows  -cholesterol levels at goal  -normal thyroid function      Called and left message on voicemail

## 2025-03-19 ENCOUNTER — OFFICE VISIT (OUTPATIENT)
Dept: UROGYNECOLOGY | Age: 70
End: 2025-03-19
Payer: MEDICARE

## 2025-03-19 DIAGNOSIS — R10.2 PELVIC PAIN: Primary | ICD-10-CM

## 2025-03-19 PROCEDURE — 1123F ACP DISCUSS/DSCN MKR DOCD: CPT | Performed by: OBSTETRICS & GYNECOLOGY

## 2025-03-19 PROCEDURE — 1090F PRES/ABSN URINE INCON ASSESS: CPT | Performed by: OBSTETRICS & GYNECOLOGY

## 2025-03-19 PROCEDURE — 1036F TOBACCO NON-USER: CPT | Performed by: OBSTETRICS & GYNECOLOGY

## 2025-03-19 PROCEDURE — 99212 OFFICE O/P EST SF 10 MIN: CPT | Performed by: OBSTETRICS & GYNECOLOGY

## 2025-03-19 PROCEDURE — G8417 CALC BMI ABV UP PARAM F/U: HCPCS | Performed by: OBSTETRICS & GYNECOLOGY

## 2025-03-19 PROCEDURE — G8399 PT W/DXA RESULTS DOCUMENT: HCPCS | Performed by: OBSTETRICS & GYNECOLOGY

## 2025-03-19 PROCEDURE — 3017F COLORECTAL CA SCREEN DOC REV: CPT | Performed by: OBSTETRICS & GYNECOLOGY

## 2025-03-19 PROCEDURE — G8428 CUR MEDS NOT DOCUMENT: HCPCS | Performed by: OBSTETRICS & GYNECOLOGY

## 2025-03-19 NOTE — PROGRESS NOTES
[facility] UROGYNECOLOGY  [address]  [address]  [place] [address]  Dept: [phone]    PCP:  Todd Snow MD    3/19/2025      HPI:  Leah Adair is here to follow up on Follow-up  .  Patient is here for pelvic pain, she decline to go to pelvic floor therapy because she went to see a Ortho MD instead. The Ortho MD told her that she has arthritis in her pelvic bone, and xray showed arthritis and she is wanting a CT scan to r/o cancer due history of breast cancer She feels like she is not being heard about the pelvic pain. She is currently being monitored by her oncologist which feels that the Prolia shot is causing the pelvic pain. Patient is not bothered by the urinary incontinences at this time she really wanting to discuss getting CT scan.      No results found for this visit on 03/19/25.    There were no vitals taken for this visit.    1. Pelvic pain  Assessment & Plan:  Most of the visit today was spent going through her medical history, surgical history. Per Ms. Adair: Oncologist states that the pain may be from Prolia and osteoporosis. She feels it may also be her arthritis. He would want to follow up with her if the pain does not subside.     She is describing pelvic pain and pain with musculoskeletal movement. She does not want to do PFPT.      I highly recommend that she speak with PCP and oncologist about her concerns.        No follow-ups on file.          Ailin Terry, DO

## 2025-03-19 NOTE — ASSESSMENT & PLAN NOTE
Most of the visit today was spent going through her medical history, surgical history. Per Ms. Adair: Oncologist states that the pain may be from Prolia and osteoporosis. She feels it may also be her arthritis. He would want to follow up with her if the pain does not subside.     She is describing pelvic pain and pain with musculoskeletal movement. She does not want to do PFPT.      I highly recommend that she speak with PCP and oncologist about her concerns.

## 2025-03-20 ENCOUNTER — RESULTS FOLLOW-UP (OUTPATIENT)
Dept: CT IMAGING | Age: 70
End: 2025-03-20

## 2025-04-09 ENCOUNTER — TELEPHONE (OUTPATIENT)
Age: 70
End: 2025-04-09

## 2025-04-09 NOTE — TELEPHONE ENCOUNTER
Patient called stating she has the following questions :    Yearly appt on 5/2/25  Does the patient need an Echo before this appt ?    Please call and advise.

## 2025-04-16 ENCOUNTER — TELEPHONE (OUTPATIENT)
Dept: PULMONOLOGY | Age: 70
End: 2025-04-16

## 2025-04-16 NOTE — TELEPHONE ENCOUNTER
Patient will need a CT order sent over  to radiology . Also she Is going on [place] trip through  the summer . She is asking when should she schedule it ' ask for a call

## 2025-04-25 NOTE — TELEPHONE ENCOUNTER
Noted, patient has been notified that she will not need another CT scan until March of 2026. // Jaylyn Zuluaga [facility] Center[facility]IRAJ

## 2025-05-12 ENCOUNTER — OFFICE VISIT (OUTPATIENT)
Age: 70
End: 2025-05-12
Payer: MEDICARE

## 2025-05-12 VITALS
WEIGHT: 156.2 LBS | HEART RATE: 77 BPM | HEIGHT: 65 IN | BODY MASS INDEX: 26.02 KG/M2 | DIASTOLIC BLOOD PRESSURE: 80 MMHG | SYSTOLIC BLOOD PRESSURE: 116 MMHG

## 2025-05-12 DIAGNOSIS — E78.2 MIXED HYPERLIPIDEMIA: ICD-10-CM

## 2025-05-12 DIAGNOSIS — I10 ESSENTIAL HYPERTENSION: Primary | ICD-10-CM

## 2025-05-12 PROCEDURE — 1126F AMNT PAIN NOTED NONE PRSNT: CPT | Performed by: INTERNAL MEDICINE

## 2025-05-12 PROCEDURE — 93000 ELECTROCARDIOGRAM COMPLETE: CPT | Performed by: INTERNAL MEDICINE

## 2025-05-12 PROCEDURE — 3079F DIAST BP 80-89 MM HG: CPT | Performed by: INTERNAL MEDICINE

## 2025-05-12 PROCEDURE — G8428 CUR MEDS NOT DOCUMENT: HCPCS | Performed by: INTERNAL MEDICINE

## 2025-05-12 PROCEDURE — G8417 CALC BMI ABV UP PARAM F/U: HCPCS | Performed by: INTERNAL MEDICINE

## 2025-05-12 PROCEDURE — 1036F TOBACCO NON-USER: CPT | Performed by: INTERNAL MEDICINE

## 2025-05-12 PROCEDURE — G8399 PT W/DXA RESULTS DOCUMENT: HCPCS | Performed by: INTERNAL MEDICINE

## 2025-05-12 PROCEDURE — 3017F COLORECTAL CA SCREEN DOC REV: CPT | Performed by: INTERNAL MEDICINE

## 2025-05-12 PROCEDURE — 3074F SYST BP LT 130 MM HG: CPT | Performed by: INTERNAL MEDICINE

## 2025-05-12 PROCEDURE — 1123F ACP DISCUSS/DSCN MKR DOCD: CPT | Performed by: INTERNAL MEDICINE

## 2025-05-12 PROCEDURE — 1090F PRES/ABSN URINE INCON ASSESS: CPT | Performed by: INTERNAL MEDICINE

## 2025-05-12 PROCEDURE — 99214 OFFICE O/P EST MOD 30 MIN: CPT | Performed by: INTERNAL MEDICINE

## 2025-05-12 ASSESSMENT — ENCOUNTER SYMPTOMS
BLURRED VISION: 0
DOUBLE VISION: 0
NAUSEA: 0
BACK PAIN: 0
ABDOMINAL PAIN: 0
HEMOPTYSIS: 0
COUGH: 0
ORTHOPNEA: 0
SHORTNESS OF BREATH: 0
BLOATING: 0
VOMITING: 0

## 2025-05-12 NOTE — PROGRESS NOTES
[facility] CARDIOLOGY  [address] [place] [address], [address]  [place] [address]  PHONE: [phone]    23    NAME:  Leah Adair  : 1955  MRN: [id number]         SUBJECTIVE:   Leah Adair is a 69 y.o. female seen for a visit regarding the following:     Chief Complaint   Patient presents with    Hypertension       HPI:      No prior hx of CAD. Hx of breast cancer (; s/p b/l mastectomy; had chemo/radiation; had adriamycin/cytoxan/taxol). Noted carotid dopplers which were unremarkable (10/19).  Prior noted Holter from  with occasional PAC/PVCs (appears symptoms correlated  with PVCs). Holter with SR and occ PACs/PVCs (5 days; ).  CT scan of the chest done from 3/2024 for routine follow-up with noted calcific vascular disease of the thoracic aorta.  Last on echocardiogram 2024 with preserved EF and no noted wall motion abnormalities.    Doing well since last visit from a cardiac standpoint.  Sporadic episodes of elevated BPs with stress but home logs reviewed and well-controlled.  Denies any chest discomfort.  No PND/orthopnea.    Not seen since  and presents today due to above noted CT scan of the chest.  Denies any palpitations.  Well-controlled home BPs.  Very active at baseline with no exertional chest discomfort.  Exercises almost daily with her .    Palpitations-controlled, history of chemotherapy-stable, hypertension-controlled, aortic calcifications-controlled    Past Medical History, Past Surgical History, Family history, Social History, and Medications were all reviewed with the patient today and updated as necessary.     Allergies   Allergen Reactions    Latex Anaphylaxis and Rash     The anaphylactic reaction was with latex paint    Lisinopril Anaphylaxis    Ondansetron Hcl Rash     Intense vasodilation with redness, hypotension, responded to epinephrine    Amlodipine Swelling     ankles swell    Kenalog [Triamcinolone Acetonide] Rash     after topical

## 2025-08-22 ENCOUNTER — OFFICE VISIT (OUTPATIENT)
Dept: PULMONOLOGY | Age: 70
End: 2025-08-22
Payer: MEDICARE

## 2025-08-22 VITALS
OXYGEN SATURATION: 99 % | TEMPERATURE: 98 F | BODY MASS INDEX: 26.82 KG/M2 | SYSTOLIC BLOOD PRESSURE: 110 MMHG | RESPIRATION RATE: 20 BRPM | HEART RATE: 65 BPM | HEIGHT: 65 IN | DIASTOLIC BLOOD PRESSURE: 80 MMHG | WEIGHT: 161 LBS

## 2025-08-22 DIAGNOSIS — J32.9 CHRONIC SINUSITIS, UNSPECIFIED LOCATION: ICD-10-CM

## 2025-08-22 DIAGNOSIS — R05.3 CHRONIC COUGH: ICD-10-CM

## 2025-08-22 DIAGNOSIS — R91.8 LUNG NODULES: Primary | ICD-10-CM

## 2025-08-22 DIAGNOSIS — Z85.3 HISTORY OF BREAST CANCER: ICD-10-CM

## 2025-08-22 PROCEDURE — 3074F SYST BP LT 130 MM HG: CPT | Performed by: INTERNAL MEDICINE

## 2025-08-22 PROCEDURE — G8427 DOCREV CUR MEDS BY ELIG CLIN: HCPCS | Performed by: INTERNAL MEDICINE

## 2025-08-22 PROCEDURE — G8399 PT W/DXA RESULTS DOCUMENT: HCPCS | Performed by: INTERNAL MEDICINE

## 2025-08-22 PROCEDURE — 99214 OFFICE O/P EST MOD 30 MIN: CPT | Performed by: INTERNAL MEDICINE

## 2025-08-22 PROCEDURE — 1036F TOBACCO NON-USER: CPT | Performed by: INTERNAL MEDICINE

## 2025-08-22 PROCEDURE — 3079F DIAST BP 80-89 MM HG: CPT | Performed by: INTERNAL MEDICINE

## 2025-08-22 PROCEDURE — 1160F RVW MEDS BY RX/DR IN RCRD: CPT | Performed by: INTERNAL MEDICINE

## 2025-08-22 PROCEDURE — G8417 CALC BMI ABV UP PARAM F/U: HCPCS | Performed by: INTERNAL MEDICINE

## 2025-08-22 PROCEDURE — 1090F PRES/ABSN URINE INCON ASSESS: CPT | Performed by: INTERNAL MEDICINE

## 2025-08-22 PROCEDURE — 3017F COLORECTAL CA SCREEN DOC REV: CPT | Performed by: INTERNAL MEDICINE

## 2025-08-22 PROCEDURE — 1159F MED LIST DOCD IN RCRD: CPT | Performed by: INTERNAL MEDICINE

## 2025-08-22 PROCEDURE — G2211 COMPLEX E/M VISIT ADD ON: HCPCS | Performed by: INTERNAL MEDICINE

## 2025-08-22 PROCEDURE — 1123F ACP DISCUSS/DSCN MKR DOCD: CPT | Performed by: INTERNAL MEDICINE

## 2025-08-22 RX ORDER — AZELASTINE 1 MG/ML
2 SPRAY, METERED NASAL 2 TIMES DAILY
Qty: 120 ML | Refills: 1 | Status: SHIPPED | OUTPATIENT
Start: 2025-08-22

## 2025-09-07 RX ORDER — EPINEPHRINE 1 MG/ML
0.3 INJECTION, SOLUTION, CONCENTRATE INTRAVENOUS PRN
OUTPATIENT
Start: 2025-09-07

## 2025-09-07 RX ORDER — ALBUTEROL SULFATE 90 UG/1
4 INHALANT RESPIRATORY (INHALATION) PRN
OUTPATIENT
Start: 2025-09-07

## 2025-09-07 RX ORDER — FAMOTIDINE 10 MG/ML
20 INJECTION, SOLUTION INTRAVENOUS
OUTPATIENT
Start: 2025-09-07

## 2025-09-07 RX ORDER — HYDROCORTISONE SODIUM SUCCINATE 100 MG/2ML
100 INJECTION INTRAMUSCULAR; INTRAVENOUS
OUTPATIENT
Start: 2025-09-07

## 2025-09-07 RX ORDER — ONDANSETRON 2 MG/ML
8 INJECTION INTRAMUSCULAR; INTRAVENOUS
OUTPATIENT
Start: 2025-09-07

## 2025-09-07 RX ORDER — ACETAMINOPHEN 325 MG/1
650 TABLET ORAL
OUTPATIENT
Start: 2025-09-07

## 2025-09-07 RX ORDER — SODIUM CHLORIDE 9 MG/ML
INJECTION, SOLUTION INTRAVENOUS CONTINUOUS
OUTPATIENT
Start: 2025-09-07

## 2025-09-07 RX ORDER — DIPHENHYDRAMINE HYDROCHLORIDE 50 MG/ML
50 INJECTION, SOLUTION INTRAMUSCULAR; INTRAVENOUS
OUTPATIENT
Start: 2025-09-07

## (undated) DEVICE — SLING ARM LG 2-37INX9-20IN PCH --

## (undated) DEVICE — DRAPE,HAND,STERILE: Brand: MEDLINE

## (undated) DEVICE — SUTURE VCRL SZ 4-0 L18IN ABSRB UD L13MM P-3 3/8 CIR PRIM J494H

## (undated) DEVICE — FOOT & ANKLE SOFT DR WOMACK: Brand: MEDLINE INDUSTRIES, INC.

## (undated) DEVICE — Device

## (undated) DEVICE — DRAPE C ARM W54XL84IN MINI FOR OEC 6800

## (undated) DEVICE — MASTISOL ADHESIVE LIQ 2/3ML

## (undated) DEVICE — PREP SKN CHLRAPRP APL 26ML STR --

## (undated) DEVICE — SYRINGE EAR 2OZ ULC SLIMMER TIP FLAT BTM SUCT PWR DISP FOR

## (undated) DEVICE — STANDARD HYPODERMIC NEEDLE,POLYPROPYLENE HUB: Brand: MONOJECT

## (undated) DEVICE — BANDAGE COBAN 4 IN COMPR W4INXL5YD FOAM COHESIVE QUIK STK SELF ADH SFT

## (undated) DEVICE — PADDING CAST W2INXL4YD ST COT COHESIVE HND TEARABLE SPEC

## (undated) DEVICE — SOLUTION IV 1000ML 0.9% SOD CHL

## (undated) DEVICE — DRAPE, FILM SHEET, 44X65 STERILE: Brand: MEDLINE

## (undated) DEVICE — TRNQT RMFG 18IN CUFF SING BLAD -- LAWSON OEM ITEM 338151

## (undated) DEVICE — BANDAGE,GAUZE,CONFORMING,2"X75",STRL,LF: Brand: MEDLINE INDUSTRIES, INC.

## (undated) DEVICE — BNDG ELAS ESMARK 4INX12FT LF -- STRL

## (undated) DEVICE — PRECISION THIN, OFFSET (5.5 X 0.38 X 25.0MM)

## (undated) DEVICE — (D)PREP SKN CHLRAPRP APPL 26ML -- CONVERT TO ITEM 371833

## (undated) DEVICE — BLADE OPHTH MINI BEAV SHRP --

## (undated) DEVICE — SOL IRR SOD CL 0.9% 1000ML BTL --

## (undated) DEVICE — SUTURE MCRYL SZ 3-0 L27IN ABSRB UD L19MM PS-2 3/8 CIR PRIM Y427H

## (undated) DEVICE — SUTURE PROL SZ 3-0 L18IN NONABSORBABLE BLU L19MM FS-2 3/8 8665G

## (undated) DEVICE — DRSG GZ OIL EMUL CURAD 3X8 --

## (undated) DEVICE — SLING ARM AD ULT

## (undated) DEVICE — SUTURE FIBERWIRE SZ 0 L38IN NONABSORBABLE BLU L22.2MM 1/2 AR7250

## (undated) DEVICE — AMD ANTIMICROBIAL BANDAGE ROLL,6 PLY: Brand: KERLIX

## (undated) DEVICE — SYR 10ML LUER LOK 1/5ML GRAD --

## (undated) DEVICE — STERILE HOOK LOCK LATEX FREE ELASTIC BANDAGE 2INX5YD: Brand: HOOK LOCK™

## (undated) DEVICE — BANDAGE COMPR W1INXL5YD FOAM COHESIVE QUIK STK SELF ADH SFT

## (undated) DEVICE — AMD ANTIMICROBIAL GAUZE SPONGES,12 PLY USP TYPE VII, 0.2% POLYHEXAMETHYLENE BIGUANIDE HCI (PHMB): Brand: CURITY

## (undated) DEVICE — COVER,MAYO STAND,STERILE: Brand: MEDLINE

## (undated) DEVICE — ZIMMER® STERILE DISPOSABLE TOURNIQUET CUFF WITH PLC, DUAL PORT, SINGLE BLADDER, 18 IN. (46 CM)

## (undated) DEVICE — DRESSING,GAUZE,XEROFORM,CURAD,1"X8",ST: Brand: CURAD

## (undated) DEVICE — BLADE OPHTH MINIATURE CORNEAL STR DEL SHRP BEAV

## (undated) DEVICE — SPONGE GZ W4XL4IN COT 12 PLY TYP VII WVN C FLD DSGN

## (undated) DEVICE — PRECISION THIN (9.0 X 0.38 X 31.0MM)

## (undated) DEVICE — PADDING CAST COHESIVE 4 YDX3 IN HND TEARABLE COTTON SPEC 100

## (undated) DEVICE — SUTURE VCRL SZ 2-0 L36IN ABSRB UD L36MM CT-1 1/2 CIR J945H

## (undated) DEVICE — PREP SKN CLORAPREP ORN STRL --

## (undated) DEVICE — SUT ETHLN 3-0 18IN PS2 BLK --

## (undated) DEVICE — DISPOSABLE BIPOLAR CODE, 12' (3.66 M): Brand: CONMED

## (undated) DEVICE — BUTTON SWITCH PENCIL BLADE ELECTRODE, HOLSTER: Brand: EDGE

## (undated) DEVICE — (D)STRIP SKN CLSR 0.5X4IN WHT --

## (undated) DEVICE — SPLINT MAT XF SPEC 5X30IN --

## (undated) DEVICE — SURGICAL PROCEDURE PACK BASIC ST FRANCIS

## (undated) DEVICE — SUTURE VCRL RAPIDE SZ 4-0 L18IN ABSRB UD PS-3 L13MM 3/8 CIR VR494

## (undated) DEVICE — REM POLYHESIVE ADULT PATIENT RETURN ELECTRODE: Brand: VALLEYLAB